# Patient Record
Sex: FEMALE | Race: WHITE | NOT HISPANIC OR LATINO | Employment: OTHER | URBAN - METROPOLITAN AREA
[De-identification: names, ages, dates, MRNs, and addresses within clinical notes are randomized per-mention and may not be internally consistent; named-entity substitution may affect disease eponyms.]

---

## 2018-08-06 ENCOUNTER — OFFICE VISIT (OUTPATIENT)
Dept: FAMILY MEDICINE | Facility: CLINIC | Age: 68
End: 2018-08-06
Payer: MEDICARE

## 2018-08-06 ENCOUNTER — HOSPITAL ENCOUNTER (OUTPATIENT)
Dept: RADIOLOGY | Facility: HOSPITAL | Age: 68
Discharge: HOME | End: 2018-08-06
Attending: NURSE PRACTITIONER
Payer: MEDICARE

## 2018-08-06 VITALS
HEIGHT: 66 IN | WEIGHT: 191 LBS | HEART RATE: 79 BPM | SYSTOLIC BLOOD PRESSURE: 138 MMHG | DIASTOLIC BLOOD PRESSURE: 83 MMHG | BODY MASS INDEX: 30.7 KG/M2 | TEMPERATURE: 98 F | OXYGEN SATURATION: 97 % | RESPIRATION RATE: 18 BRPM

## 2018-08-06 DIAGNOSIS — R05.9 COUGH: ICD-10-CM

## 2018-08-06 DIAGNOSIS — R05.9 COUGH: Primary | ICD-10-CM

## 2018-08-06 PROCEDURE — 99203 OFFICE O/P NEW LOW 30 MIN: CPT | Mod: S$GLB,,, | Performed by: NURSE PRACTITIONER

## 2018-08-06 PROCEDURE — 71046 X-RAY EXAM CHEST 2 VIEWS: CPT | Mod: TC,FY

## 2018-08-06 PROCEDURE — 71046 X-RAY EXAM CHEST 2 VIEWS: CPT | Mod: 26,,, | Performed by: RADIOLOGY

## 2018-08-06 RX ORDER — ASPIRIN 81 MG/1
81 TABLET ORAL DAILY
COMMUNITY
End: 2023-08-30

## 2018-08-06 RX ORDER — FLUTICASONE PROPIONATE 50 MCG
1 SPRAY, SUSPENSION (ML) NASAL DAILY
Qty: 1 BOTTLE | Refills: 3 | Status: SHIPPED | OUTPATIENT
Start: 2018-08-06 | End: 2018-10-09

## 2018-08-06 RX ORDER — OMEPRAZOLE 40 MG/1
40 CAPSULE, DELAYED RELEASE ORAL DAILY
COMMUNITY
End: 2019-02-04 | Stop reason: SDUPTHER

## 2018-08-06 RX ORDER — FLUOXETINE HYDROCHLORIDE 40 MG/1
40 CAPSULE ORAL DAILY
COMMUNITY
End: 2019-02-04 | Stop reason: SDUPTHER

## 2018-08-06 RX ORDER — MONTELUKAST SODIUM 10 MG/1
10 TABLET ORAL NIGHTLY
COMMUNITY
End: 2018-10-09

## 2018-08-06 RX ORDER — ATORVASTATIN CALCIUM 20 MG/1
20 TABLET, FILM COATED ORAL DAILY
COMMUNITY
End: 2019-02-04 | Stop reason: SDUPTHER

## 2018-08-06 NOTE — PROGRESS NOTES
Chief Complaint  Chief Complaint   Patient presents with    Establish Care     allergy problem, runny nose and dry cough       HPI  Zuly Person is a 67 y.o. female with medical diagnoses as listed within the medical history and problem list that presents for dry cough for approximately 2-3 months. She has been placed on Singulair with no improvement. She notes cough is dry and lingering. No fever, dyspnea or CP present..     PAST MEDICAL HISTORY:  Past Medical History:   Diagnosis Date    Anxiety     Cancer     breast cancer       PAST SURGICAL HISTORY:  Past Surgical History:   Procedure Laterality Date    BREAST SURGERY      implants    CHOLECYSTECTOMY      HYSTERECTOMY      MASTECTOMY         SOCIAL HISTORY:  Social History     Social History    Marital status:      Spouse name: N/A    Number of children: N/A    Years of education: N/A     Occupational History    Not on file.     Social History Main Topics    Smoking status: Never Smoker    Smokeless tobacco: Never Used    Alcohol use Yes    Drug use: No    Sexual activity: No     Other Topics Concern    Not on file     Social History Narrative    No narrative on file       FAMILY HISTORY:  No family history on file.    ALLERGIES AND MEDICATIONS: updated and reviewed.  Review of patient's allergies indicates:  No Known Allergies  Current Outpatient Prescriptions   Medication Sig Dispense Refill    aspirin (ECOTRIN) 81 MG EC tablet Take 81 mg by mouth once daily.      atorvastatin (LIPITOR) 20 MG tablet Take 20 mg by mouth once daily.      FLUoxetine (PROZAC) 40 MG capsule Take 40 mg by mouth once daily.      montelukast (SINGULAIR) 10 mg tablet Take 10 mg by mouth every evening.      omeprazole (PRILOSEC) 40 MG capsule Take 40 mg by mouth once daily.      fluticasone (FLONASE) 50 mcg/actuation nasal spray 1 spray (50 mcg total) by Each Nare route once daily. 1 Bottle 3     No current facility-administered medications for this visit.   "        ROS  Review of Systems   Constitutional: Negative for activity change, chills, fatigue and fever.   HENT: Positive for congestion, postnasal drip, rhinorrhea and sneezing.    Respiratory: Positive for cough. Negative for chest tightness, shortness of breath and wheezing.    Cardiovascular: Negative for chest pain, palpitations and leg swelling.   Gastrointestinal: Negative for diarrhea, nausea and vomiting.   Musculoskeletal: Negative for arthralgias, back pain, gait problem and neck pain.   Skin: Negative for color change.   Neurological: Negative for dizziness, syncope, light-headedness and headaches.   Psychiatric/Behavioral: Negative for behavioral problems.           PHYSICAL EXAM  Vitals:    08/06/18 1320   BP: 138/83   Pulse: 79   Resp: 18   Temp: 97.5 °F (36.4 °C)   TempSrc: Tympanic   SpO2: 97%   Weight: 86.6 kg (191 lb)   Height: 5' 6" (1.676 m)    Body mass index is 30.83 kg/m².  Weight: 86.6 kg (191 lb)   Height: 5' 6" (167.6 cm)       Physical Exam   Constitutional: She is oriented to person, place, and time. She appears well-developed and well-nourished.   HENT:   Head: Normocephalic and atraumatic.   Right Ear: Tympanic membrane normal.   Left Ear: Tympanic membrane normal.   Nose: Mucosal edema and rhinorrhea present.   Eyes: EOM are normal. Pupils are equal, round, and reactive to light.   Neck: Normal range of motion. Neck supple.   Cardiovascular: Normal rate, regular rhythm and normal heart sounds.    No murmur heard.  Pulmonary/Chest: Effort normal and breath sounds normal.   Abdominal: Soft. There is no tenderness.   Musculoskeletal: Normal range of motion.   Lymphadenopathy:     She has no cervical adenopathy.   Neurological: She is alert and oriented to person, place, and time.   Skin: Skin is warm.   Psychiatric: She has a normal mood and affect. Her behavior is normal. Thought content normal.         Health Maintenance    Patient has no pending health maintenance at this time   "            Assessment & Plan    Zuly was seen today for establish care.    Diagnoses and all orders for this visit:    Cough  -     X-Ray Chest PA And Lateral; Future  -     Mycoplasma Rapid Test; Future  -     CBC auto differential; Future  -     Comprehensive metabolic panel; Future  -     fluticasone (FLONASE) 50 mcg/actuation nasal spray; 1 spray (50 mcg total) by Each Nare route once daily.    Discussed differentials with patient. Will proceed with imaging and labs. Will add Flonase for now and follow up to discuss results in one week.    Follow-up: Follow-up in about 1 week (around 8/13/2018).

## 2018-08-07 DIAGNOSIS — R05.9 COUGH: Primary | ICD-10-CM

## 2018-08-07 RX ORDER — AZITHROMYCIN 250 MG/1
250 TABLET, FILM COATED ORAL DAILY
Qty: 6 TABLET | Refills: 0 | Status: SHIPPED | OUTPATIENT
Start: 2018-08-07 | End: 2018-12-19 | Stop reason: SDUPTHER

## 2018-08-07 RX ORDER — ALBUTEROL SULFATE 90 UG/1
2 AEROSOL, METERED RESPIRATORY (INHALATION) EVERY 6 HOURS PRN
Qty: 1 INHALER | Refills: 0 | Status: SHIPPED | OUTPATIENT
Start: 2018-08-07 | End: 2018-12-11 | Stop reason: SDUPTHER

## 2018-08-10 ENCOUNTER — TELEPHONE (OUTPATIENT)
Dept: FAMILY MEDICINE | Facility: CLINIC | Age: 68
End: 2018-08-10

## 2018-08-10 NOTE — TELEPHONE ENCOUNTER
----- Message from Devan Perez sent at 8/10/2018  9:12 AM CDT -----  Contact: self   Patient need to speak with a nurse regarding her inhaler, please call back at [phone].

## 2018-08-10 NOTE — TELEPHONE ENCOUNTER
"Spoke to patient about her inhaler. She was concerned that she may not need the inhaler every day. I explained to her about the inhaler and the "as needed" use. We talked about COPD and how it effects the body. Educated on s/s to use and when to seek medical attention. She verbalized understanding and has no other questions or concerns.   "

## 2018-08-10 NOTE — TELEPHONE ENCOUNTER
----- Message from Marcela Austin sent at 8/10/2018  1:17 PM CDT -----  Type: Needs Medical Advice    Who Called:  Patient  Symptoms (please be specific):  Nicolasa  How long has patient had these symptoms:  Nicolasa  Pharmacy name and phone #:  Nicolasa  Best Call Back Number: [phone] (home)     Additional Information: Wanted to know if a particular test could be completed while at her visit on Tuesday

## 2018-08-10 NOTE — TELEPHONE ENCOUNTER
Patient is very worried about a new diagnosis of COPD. She is asking is a spirometry can be done to verify the x-ray results before her appointment on Tuesday?

## 2018-08-13 NOTE — TELEPHONE ENCOUNTER
Yes it can. But it is likely her pulmonologist will order it. I can order it but likely won't be done by tomorrow. Please let me know.

## 2018-08-14 ENCOUNTER — OFFICE VISIT (OUTPATIENT)
Dept: FAMILY MEDICINE | Facility: CLINIC | Age: 68
End: 2018-08-14
Payer: MEDICARE

## 2018-08-14 VITALS
BODY MASS INDEX: 30.7 KG/M2 | SYSTOLIC BLOOD PRESSURE: 140 MMHG | DIASTOLIC BLOOD PRESSURE: 80 MMHG | HEIGHT: 66 IN | HEART RATE: 88 BPM | OXYGEN SATURATION: 99 % | TEMPERATURE: 97 F | WEIGHT: 191 LBS

## 2018-08-14 DIAGNOSIS — R05.9 COUGH: Primary | ICD-10-CM

## 2018-08-14 DIAGNOSIS — J44.9 CHRONIC OBSTRUCTIVE PULMONARY DISEASE, UNSPECIFIED COPD TYPE: ICD-10-CM

## 2018-08-14 PROCEDURE — 99213 OFFICE O/P EST LOW 20 MIN: CPT | Mod: S$GLB,,, | Performed by: NURSE PRACTITIONER

## 2018-08-15 PROBLEM — J44.9 CHRONIC OBSTRUCTIVE PULMONARY DISEASE: Status: ACTIVE | Noted: 2018-08-15

## 2018-08-15 NOTE — PROGRESS NOTES
Chief Complaint  Chief Complaint   Patient presents with    Follow-up     lab review        HPI  Zuly Person is a 67 y.o. female with medical diagnoses as listed within the medical history and problem list that presents for dry cough for approximately 2-3 months. She has been placed on Singulair with no improvement. She notes cough is dry and lingering. No fever, dyspnea or CP present..     PAST MEDICAL HISTORY:  Past Medical History:   Diagnosis Date    Anxiety     Cancer     breast cancer       PAST SURGICAL HISTORY:  Past Surgical History:   Procedure Laterality Date    BREAST SURGERY      implants    CHOLECYSTECTOMY      HYSTERECTOMY      MASTECTOMY         SOCIAL HISTORY:  Social History     Socioeconomic History    Marital status:      Spouse name: Not on file    Number of children: Not on file    Years of education: Not on file    Highest education level: Not on file   Social Needs    Financial resource strain: Not on file    Food insecurity - worry: Not on file    Food insecurity - inability: Not on file    Transportation needs - medical: Not on file    Transportation needs - non-medical: Not on file   Occupational History    Not on file   Tobacco Use    Smoking status: Never Smoker    Smokeless tobacco: Never Used   Substance and Sexual Activity    Alcohol use: Yes    Drug use: No    Sexual activity: No   Other Topics Concern    Not on file   Social History Narrative    Not on file       FAMILY HISTORY:  History reviewed. No pertinent family history.    ALLERGIES AND MEDICATIONS: updated and reviewed.  Review of patient's allergies indicates:  No Known Allergies  Current Outpatient Medications   Medication Sig Dispense Refill    albuterol 90 mcg/actuation inhaler Inhale 2 puffs into the lungs every 6 (six) hours as needed for Wheezing. Rescue 1 Inhaler 0    aspirin (ECOTRIN) 81 MG EC tablet Take 81 mg by mouth once daily.      atorvastatin (LIPITOR) 20 MG tablet Take 20 mg  "by mouth once daily.      azithromycin (Z-PENG) 250 MG tablet Take 1 tablet (250 mg total) by mouth once daily. Take 2 tabs PO day 1, then take 1 tab PO once daily for days 2-5. 6 tablet 0    FLUoxetine (PROZAC) 40 MG capsule Take 40 mg by mouth once daily.      fluticasone (FLONASE) 50 mcg/actuation nasal spray 1 spray (50 mcg total) by Each Nare route once daily. 1 Bottle 3    montelukast (SINGULAIR) 10 mg tablet Take 10 mg by mouth every evening.      omeprazole (PRILOSEC) 40 MG capsule Take 40 mg by mouth once daily.       No current facility-administered medications for this visit.          ROS  Review of Systems   Constitutional: Negative for activity change, chills, fatigue and fever.   HENT: Negative for congestion, postnasal drip, rhinorrhea and sneezing.    Respiratory: Positive for cough (improved but still present, not as severe). Negative for chest tightness, shortness of breath and wheezing.    Cardiovascular: Negative for chest pain, palpitations and leg swelling.   Gastrointestinal: Negative for diarrhea, nausea and vomiting.   Musculoskeletal: Negative for arthralgias, back pain, gait problem and neck pain.   Skin: Negative for color change.   Neurological: Negative for dizziness, syncope, light-headedness and headaches.   Psychiatric/Behavioral: Negative for behavioral problems.           PHYSICAL EXAM  Vitals:    08/14/18 1415 08/14/18 1458   BP: 134/88 (!) 140/80   BP Location: Left arm    Patient Position: Sitting    BP Method: Large (Automatic)    Pulse: 88    Temp: 97.3 °F (36.3 °C)    TempSrc: Tympanic    SpO2: 99%    Weight: 86.6 kg (191 lb)    Height: 5' 6" (1.676 m)     Body mass index is 30.83 kg/m².  Weight: 86.6 kg (191 lb)   Height: 5' 6" (167.6 cm)       Physical Exam   Constitutional: She is oriented to person, place, and time. She appears well-developed and well-nourished.   HENT:   Head: Normocephalic and atraumatic.   Right Ear: Tympanic membrane normal.   Left Ear: Tympanic " membrane normal.   Nose: Mucosal edema and rhinorrhea present.   Eyes: EOM are normal. Pupils are equal, round, and reactive to light.   Neck: Normal range of motion. Neck supple.   Cardiovascular: Normal rate, regular rhythm and normal heart sounds.   No murmur heard.  Pulmonary/Chest: Effort normal and breath sounds normal.   Abdominal: Soft. There is no tenderness.   Musculoskeletal: Normal range of motion.   Lymphadenopathy:     She has no cervical adenopathy.   Neurological: She is alert and oriented to person, place, and time.   Skin: Skin is warm.   Psychiatric: She has a normal mood and affect. Her behavior is normal. Thought content normal.         Health Maintenance    Patient has no pending health maintenance at this time              Assessment & Plan    Zuly was seen today for follow-up.    Diagnoses and all orders for this visit:    Cough  -     Ambulatory referral to Pulmonology    Chronic obstructive pulmonary disease, unspecified COPD type  -     Ambulatory referral to Pulmonology    Discussed differentials with patient. She will continue Singulair, Flonase, Prilosec and Albuterol inhaler if needed. I discussed pulmonology evaluation due to ongoing symptoms and new findings of interstitial changes. She is amicable with plan and referral sent.     Follow-up: Follow-up if symptoms worsen or fail to improve.

## 2018-10-09 ENCOUNTER — OFFICE VISIT (OUTPATIENT)
Dept: PULMONOLOGY | Facility: CLINIC | Age: 68
End: 2018-10-09
Payer: MEDICARE

## 2018-10-09 VITALS
SYSTOLIC BLOOD PRESSURE: 173 MMHG | DIASTOLIC BLOOD PRESSURE: 81 MMHG | WEIGHT: 196.19 LBS | HEART RATE: 70 BPM | OXYGEN SATURATION: 99 % | HEIGHT: 66 IN | BODY MASS INDEX: 31.53 KG/M2

## 2018-10-09 DIAGNOSIS — J45.20 MILD INTERMITTENT ASTHMA WITHOUT COMPLICATION: ICD-10-CM

## 2018-10-09 DIAGNOSIS — R05.3 CHRONIC COUGH: ICD-10-CM

## 2018-10-09 DIAGNOSIS — R09.89 CHRONIC SINUS COMPLAINTS: ICD-10-CM

## 2018-10-09 DIAGNOSIS — K21.9 GASTROESOPHAGEAL REFLUX DISEASE, ESOPHAGITIS PRESENCE NOT SPECIFIED: Primary | ICD-10-CM

## 2018-10-09 PROBLEM — J44.9 CHRONIC OBSTRUCTIVE PULMONARY DISEASE: Status: RESOLVED | Noted: 2018-08-15 | Resolved: 2018-10-09

## 2018-10-09 PROCEDURE — 99999 PR PBB SHADOW E&M-EST. PATIENT-LVL IV: CPT | Mod: PBBFAC,,, | Performed by: INTERNAL MEDICINE

## 2018-10-09 PROCEDURE — 99214 OFFICE O/P EST MOD 30 MIN: CPT | Mod: PBBFAC,PO | Performed by: INTERNAL MEDICINE

## 2018-10-09 PROCEDURE — 99205 OFFICE O/P NEW HI 60 MIN: CPT | Mod: S$PBB,,, | Performed by: INTERNAL MEDICINE

## 2018-10-09 RX ORDER — BUDESONIDE AND FORMOTEROL FUMARATE DIHYDRATE 160; 4.5 UG/1; UG/1
2 AEROSOL RESPIRATORY (INHALATION) EVERY 12 HOURS
Qty: 1 INHALER | Refills: 11 | Status: SHIPPED | OUTPATIENT
Start: 2018-10-09 | End: 2019-02-04 | Stop reason: SDUPTHER

## 2018-10-09 RX ORDER — MONTELUKAST SODIUM 10 MG/1
10 TABLET ORAL NIGHTLY
Qty: 30 TABLET | Refills: 11 | Status: SHIPPED | OUTPATIENT
Start: 2018-10-09 | End: 2019-02-04 | Stop reason: SDUPTHER

## 2018-10-09 RX ORDER — PREDNISONE 20 MG/1
TABLET ORAL
Qty: 12 TABLET | Refills: 0 | Status: SHIPPED | OUTPATIENT
Start: 2018-10-09 | End: 2018-12-19 | Stop reason: SDUPTHER

## 2018-10-09 RX ORDER — FLUTICASONE PROPIONATE 50 MCG
1 SPRAY, SUSPENSION (ML) NASAL DAILY
Qty: 1 BOTTLE | Refills: 11 | Status: SHIPPED | OUTPATIENT
Start: 2018-10-09 | End: 2019-02-04 | Stop reason: SDUPTHER

## 2018-10-09 NOTE — PATIENT INSTRUCTIONS
You have mild intermittent asthma.    Impairment is measured by lung capacity- over 50% is not bad- below 80% would suggest component of copd.    Would recommend trial symbicort 2 twice daily (bedtime only ok)- use mainly when cough active or if helps.    Use albutrerol as needed - 2-3 puffs every 4 hrs as needed for cough.    If cough bad- may use prednisone as would clear asthma dramatically.    You have component of upper airway syndrome contributing to cough- flonase 1-2 each side, or singulair should help.    cxr no report of concern for cancer.    Follow up if needed/etc....

## 2018-10-09 NOTE — PROGRESS NOTES
"10/9/2018    Zulynorth Person  New Patient Consult    Chief Complaint   Patient presents with    Cough    COPD       HPI: pt stopped smoking 20 yrs ago with birth grandson.  Started coughing 18 yrs ago with resolution with gerd rx.    Pt no limits with chores- no exercise.   good, lives [facility].      Sinus ok.  No asthma.     Had chronic cough - nocturnal worsening. Ongoing for yrs- seasonally worse in spring/summer- better last wks.     Used albuterol once with no benefit.  No wheezes.     The chief compliant  problem is new to me",   PFSH:  Past Medical History:   Diagnosis Date    Anxiety     Cancer     breast cancer         Past Surgical History:   Procedure Laterality Date    BREAST SURGERY      implants    CHOLECYSTECTOMY      HYSTERECTOMY      MASTECTOMY       Social History     Tobacco Use    Smoking status: Former Smoker     Packs/day: 1.00     Years: 15.00     Pack years: 15.00     Types: Cigarettes     Last attempt to quit:      Years since quittin.7    Smokeless tobacco: Never Used   Substance Use Topics    Alcohol use: Yes     Frequency: 4 or more times a week     Drinks per session: 1 or 2     Binge frequency: Never    Drug use: No     History reviewed. No pertinent family history.  Review of patient's allergies indicates:  No Known Allergies    Performance Status:The patient's activity level is no limits with regular activity.      Review of Systems:  a review of eleven systems covering constitutional, Eye, HEENT, Psych, Respiratory, Cardiac, GI, , Musculoskeletal, Endocrine, Dermatologic was negative except for pertinent findings as listed ABOVE and below:  pertinent positive as above, rest is good       Exam:Comprehensive exam done. BP (!) 173/81 (BP Location: Left arm, Patient Position: Sitting)   Pulse 70   Ht 5' 6" (1.676 m)   Wt 89 kg (196 lb 3.4 oz)   SpO2 99% Comment: on room air  BMI 31.67 kg/m²   Exam included Vitals as listed, and patient's appearance and " affect and alertness and mood, oral exam for yeast and hygiene and pharynx lesions and Mallapatti (M) score, neck with inspection for jvd and masses and thyroid abnormalities and lymph nodes (supraclavicular and infraclavicular nodes and axillary also examined and noted if abn), chest exam included symmetry and effort and fremitus and percussion and auscultation, cardiac exam included rhythm and gallops and murmur and rubs and jvd and edema, abdominal exam for mass and hepatosplenomegaly and tenderness and hernias and bowel sounds, Musculoskeletal exam with muscle tone and posture and mobility/gait and  strength, and skin for rashes and cyanosis and pallor and turgor, extremity for clubbing.  Findings were normal except for pertinent findings listed below:  M2, chest is symmetric, no distress, normal percussion, normal fremitus and good normal breath sounds      Radiographs (ct chest and cxr) reviewed: results reviewed  Copd and ild changes?    Labs none available        PFT will be done and results to be reviewed       Plan:  Clinical impression is apparently straight forward and impression with management as below.      Zuly was seen today for cough and copd.    Diagnoses and all orders for this visit:    Gastroesophageal reflux disease, esophagitis presence not specified    Mild intermittent asthma without complication  -     Complete PFT with bronchodilator; Future  -     budesonide-formoterol 160-4.5 mcg (SYMBICORT) 160-4.5 mcg/actuation HFAA; Inhale 2 puffs into the lungs every 12 (twelve) hours. Controller  -     predniSONE (DELTASONE) 20 MG tablet; One daily for 3 days and repeat for flare of lung symptoms as intructed  -     montelukast (SINGULAIR) 10 mg tablet; Take 1 tablet (10 mg total) by mouth every evening.    Chronic cough  -     Complete PFT with bronchodilator; Future  -     budesonide-formoterol 160-4.5 mcg (SYMBICORT) 160-4.5 mcg/actuation HFAA; Inhale 2 puffs into the lungs every 12 (twelve)  hours. Controller  -     predniSONE (DELTASONE) 20 MG tablet; One daily for 3 days and repeat for flare of lung symptoms as intructed  -     montelukast (SINGULAIR) 10 mg tablet; Take 1 tablet (10 mg total) by mouth every evening.    Chronic sinus complaints  -     montelukast (SINGULAIR) 10 mg tablet; Take 1 tablet (10 mg total) by mouth every evening.  -     fluticasone (FLONASE) 50 mcg/actuation nasal spray; 1 spray (50 mcg total) by Each Nare route once daily.        Follow-up if symptoms worsen or fail to improve.    Discussed with patient above for education the following:      Patient Instructions   You have mild intermittent asthma.    Impairment is measured by lung capacity- over 50% is not bad- below 80% would suggest component of copd.    Would recommend trial symbicort 2 twice daily (bedtime only ok)- use mainly when cough active or if helps.    Use albutrerol as needed - 2-3 puffs every 4 hrs as needed for cough.    If cough bad- may use prednisone as would clear asthma dramatically.    You have component of upper airway syndrome contributing to cough- flonase 1-2 each side, or singulair should help.    cxr no report of concern for cancer.    Follow up if needed/etc....

## 2018-10-09 NOTE — LETTER
October 9, 2018      Sakshi Mckeon, FNP  [address] [place] [address]           [place] [facility]  [address]  [place] [address]  Phone: [phone]  Fax: [phone]          Patient: Zuly Person   MR Number: [id number]   YOB: 1950   Date of Visit: 10/9/2018       Dear Sakshi Mckeon:    Thank you for referring Zuly Person to me for evaluation. Attached you will find relevant portions of my assessment and plan of care.    If you have questions, please do not hesitate to call me. I look forward to following Zuly Person along with you.    Sincerely,    Josh Herndon MD    Enclosure  CC:  No Recipients    If you would like to receive this communication electronically, please contact [email or web address] or [phone] to request more information on [employer] Link access.    For providers and/or their staff who would like to refer a patient to [facility], please contact us through our one-stop-shop provider referral line, [facility], at [phone].    If you feel you have received this communication in error or would no longer like to receive these types of communications, please e-mail [email or web address]

## 2018-10-17 ENCOUNTER — TELEPHONE (OUTPATIENT)
Dept: SURGERY | Facility: CLINIC | Age: 68
End: 2018-10-17

## 2018-10-17 NOTE — TELEPHONE ENCOUNTER
LVM for pt to return call concerning message. Pt needs a referral before consult can be scheduled.

## 2018-10-17 NOTE — TELEPHONE ENCOUNTER
----- Message from Ingris Hernandez sent at 10/17/2018 11:00 AM CDT -----  Type: Needs Medical Advice    Who Called:  Patient   Symptoms (please be specific):  n/a  How long has patient had these symptoms:  n/a  Pharmacy name and phone #:  n/a  Best Call Back Number: [phone]  Additional Information: patient is requesting to schedule a screening colonoscopy contact to advise    Thank you

## 2018-12-11 DIAGNOSIS — R05.9 COUGH: ICD-10-CM

## 2018-12-11 RX ORDER — ALBUTEROL SULFATE 90 UG/1
2 AEROSOL, METERED RESPIRATORY (INHALATION) EVERY 6 HOURS PRN
Qty: 1 INHALER | Refills: 0 | Status: SHIPPED | OUTPATIENT
Start: 2018-12-11 | End: 2018-12-19 | Stop reason: SDUPTHER

## 2018-12-11 NOTE — TELEPHONE ENCOUNTER
Sent rx refill request from pt to Md for approval.  ----- Message from Ramez Lopez sent at 12/11/2018  8:34 AM CST -----  Type:  RX Refill Request    Who Called: pt  Refill or New Rx: refill  RX Name and Strength:  albuterol 90 mcg/actuation inhaler   Preferred Pharmacy with phone number:    [facility] [address] [place] [address] AT [facility] [address]  [address] [address]  Phone: [phone] Fax: [phone]  Local or Mail Order:  local  Ordering Provider:  same  Best Call Back Number:  [phone]  Additional Information:  Pt needs a refill. Please call pt when completed to advise.

## 2018-12-19 ENCOUNTER — TELEPHONE (OUTPATIENT)
Dept: PULMONOLOGY | Facility: CLINIC | Age: 68
End: 2018-12-19

## 2018-12-19 ENCOUNTER — OFFICE VISIT (OUTPATIENT)
Dept: PULMONOLOGY | Facility: CLINIC | Age: 68
End: 2018-12-19
Payer: MEDICARE

## 2018-12-19 VITALS
SYSTOLIC BLOOD PRESSURE: 134 MMHG | HEART RATE: 73 BPM | OXYGEN SATURATION: 94 % | WEIGHT: 195.13 LBS | HEIGHT: 66 IN | BODY MASS INDEX: 31.36 KG/M2 | DIASTOLIC BLOOD PRESSURE: 76 MMHG

## 2018-12-19 DIAGNOSIS — R05.9 COUGH: ICD-10-CM

## 2018-12-19 DIAGNOSIS — J45.20 MILD INTERMITTENT ASTHMA WITHOUT COMPLICATION: ICD-10-CM

## 2018-12-19 DIAGNOSIS — R05.3 CHRONIC COUGH: ICD-10-CM

## 2018-12-19 PROCEDURE — 99213 OFFICE O/P EST LOW 20 MIN: CPT | Mod: PBBFAC,PO | Performed by: NURSE PRACTITIONER

## 2018-12-19 PROCEDURE — 99999 PR PBB SHADOW E&M-EST. PATIENT-LVL III: CPT | Mod: PBBFAC,,, | Performed by: NURSE PRACTITIONER

## 2018-12-19 PROCEDURE — 99214 OFFICE O/P EST MOD 30 MIN: CPT | Mod: S$PBB,,, | Performed by: NURSE PRACTITIONER

## 2018-12-19 RX ORDER — PREDNISONE 20 MG/1
TABLET ORAL
Qty: 12 TABLET | Refills: 0 | Status: SHIPPED | OUTPATIENT
Start: 2018-12-19 | End: 2019-02-18

## 2018-12-19 RX ORDER — AZITHROMYCIN 250 MG/1
250 TABLET, FILM COATED ORAL DAILY
Qty: 6 TABLET | Refills: 2 | Status: SHIPPED | OUTPATIENT
Start: 2018-12-19 | End: 2019-02-18

## 2018-12-19 RX ORDER — ALBUTEROL SULFATE 90 UG/1
2 AEROSOL, METERED RESPIRATORY (INHALATION) EVERY 6 HOURS PRN
Qty: 1 INHALER | Refills: 11 | Status: SHIPPED | OUTPATIENT
Start: 2018-12-19 | End: 2019-02-04 | Stop reason: SDUPTHER

## 2018-12-19 NOTE — TELEPHONE ENCOUNTER
Pharmacy called saying they do not carry codeine 5-guafensin 200mg had to be changed to codeine5 guafensin  100mg. Discussed with NP. NP stated the changed was fine. Pharmacist changed order under MD name. Verbal readback was done. No further action needed.

## 2018-12-19 NOTE — PROGRESS NOTES
"2018    Zulynorth Person  New Patient Consult    Chief Complaint   Patient presents with    Cough     has returned and is more persistent, finished prednisone with little relief       HPI:     2018-  Had return cough, albuterol may have worsened and then improved.  Relapse  and started symbicort regular, took prednisone for 3 days last wk with good response then relapse.  Had tickle in throat. Pt has wheeze intermittently.      10/9/2018pt stopped smoking 20 yrs ago with birth grandson.  Started coughing 18 yrs ago with resolution with gerd rx.  Pt no limits with chores- no exercise.   good, lives [facility].    Sinus ok.  No asthma.   Had chronic cough - nocturnal worsening. Ongoing for yrs- seasonally worse in spring/summer- better last wks.   Used albuterol once with no benefit.  No wheezes.   Patient Instructions   You have mild intermittent asthma.   Impairment is measured by lung capacity- over 50% is not bad- below 80% would suggest component of copd.   Would recommend trial symbicort 2 twice daily (bedtime only ok)- use mainly when cough active or if helps.   Use albutrerol as needed - 2-3 puffs every 4 hrs as needed for cough.   If cough bad- may use prednisone as would clear asthma dramatically.   You have component of upper airway syndrome contributing to cough- flonase 1-2 each side, or singulair should help.   cxr no report of concern for cancer.   Follow up if needed/etc....         The chief compliant  problem is new to me",   PFSH:  Past Medical History:   Diagnosis Date    Anxiety     Cancer     breast cancer         Past Surgical History:   Procedure Laterality Date    BREAST SURGERY      implants    CHOLECYSTECTOMY      HYSTERECTOMY      MASTECTOMY       Social History     Tobacco Use    Smoking status: Former Smoker     Packs/day: 1.00     Years: 15.00     Pack years: 15.00     Types: Cigarettes     Last attempt to quit:      Years since quittin.9    " "Smokeless tobacco: Never Used   Substance Use Topics    Alcohol use: Yes     Frequency: 4 or more times a week     Drinks per session: 1 or 2     Binge frequency: Never    Drug use: No     History reviewed. No pertinent family history.  Review of patient's allergies indicates:  No Known Allergies    Performance Status:The patient's activity level is no limits with regular activity.      Review of Systems:  a review of eleven systems covering constitutional, Eye, HEENT, Psych, Respiratory, Cardiac, GI, , Musculoskeletal, Endocrine, Dermatologic was negative except for pertinent findings as listed ABOVE and below:  pertinent positive as above, rest is good       Exam:Comprehensive exam done. /76 (BP Location: Left arm, Patient Position: Sitting)   Pulse 73   Ht 5' 6" (1.676 m)   Wt 88.5 kg (195 lb 1.7 oz)   SpO2 (!) 94% Comment: on room air  BMI 31.49 kg/m²   Exam included Vitals as listed, and patient's appearance and affect and alertness and mood, oral exam for yeast and hygiene and pharynx lesions and Mallapatti (M) score, neck with inspection for jvd and masses and thyroid abnormalities and lymph nodes (supraclavicular and infraclavicular nodes and axillary also examined and noted if abn), chest exam included symmetry and effort and fremitus and percussion and auscultation, cardiac exam included rhythm and gallops and murmur and rubs and jvd and edema, abdominal exam for mass and hepatosplenomegaly and tenderness and hernias and bowel sounds, Musculoskeletal exam with muscle tone and posture and mobility/gait and  strength, and skin for rashes and cyanosis and pallor and turgor, extremity for clubbing.  Findings were normal except for pertinent findings listed below:  M2, chest is symmetric, no distress, normal percussion, normal fremitus and good normal breath sounds      Radiographs (ct chest and cxr) reviewed: results reviewed  Copd and ild changes?    Labs none available        PFT will be " done and results to be reviewed       Plan:  Clinical impression is apparently straight forward and impression with management as below.      Zuly was seen today for cough.    Diagnoses and all orders for this visit:    Mild intermittent asthma without complication  -     predniSONE (DELTASONE) 20 MG tablet; One daily for 3 days and repeat for flare of lung symptoms as intructed  -     azithromycin (Z-PENG) 250 MG tablet; Take 1 tablet (250 mg total) by mouth once daily. Take 2 tabs PO day 1, then take 1 tab PO once daily for days 2-5.  -     albuterol (PROVENTIL/VENTOLIN HFA) 90 mcg/actuation inhaler; Inhale 2 puffs into the lungs every 6 (six) hours as needed for Wheezing. Rescue  -     Complete PFT without bronchodilator; Future  -     codeine-guaifenesin  mg/5 mL Liqd; Take 10 mLs by mouth 3 (three) times daily as needed.    Chronic cough  -     predniSONE (DELTASONE) 20 MG tablet; One daily for 3 days and repeat for flare of lung symptoms as intructed  -     codeine-guaifenesin  mg/5 mL Liqd; Take 10 mLs by mouth 3 (three) times daily as needed.    Cough  -     azithromycin (Z-PENG) 250 MG tablet; Take 1 tablet (250 mg total) by mouth once daily. Take 2 tabs PO day 1, then take 1 tab PO once daily for days 2-5.  -     albuterol (PROVENTIL/VENTOLIN HFA) 90 mcg/actuation inhaler; Inhale 2 puffs into the lungs every 6 (six) hours as needed for Wheezing. Rescue        Follow-up in about 1 year (around 12/19/2019), or if symptoms worsen or fail to improve.    Discussed with patient above for education the following:      Patient Instructions   Asthma by history, breathing test needed to determine if breathing impaired.   specail asthma test useful if no response.       Asthma prevention- symbicort 2 twice daily, decrease frequency and severity of asthma    Rescue - albuterol 2-3 with spacer as needed, call in if wish nebulizer machine?  May trigger cough less over 15 minutes needed to use    Action plan -  will put asthma into remission if high enough dose long enough given.  symbicort alone may work if very mild    May skip flonase /singular if doing well    Azithromycin if cough yellow mucous.    Codeine suppresses cough- will not treat asthma.

## 2018-12-19 NOTE — PATIENT INSTRUCTIONS
Asthma by history, breathing test needed to determine if breathing impaired.   specail asthma test useful if no response.       Asthma prevention- symbicort 2 twice daily, decrease frequency and severity of asthma    Rescue - albuterol 2-3 with spacer as needed, call in if wish nebulizer machine?  May trigger cough less over 15 minutes needed to use    Action plan - will put asthma into remission if high enough dose long enough given.  symbicort alone may work if very mild    May skip flonase /singular if doing well    Azithromycin if cough yellow mucous.    Codeine suppresses cough- will not treat asthma.

## 2019-01-28 ENCOUNTER — HOSPITAL ENCOUNTER (OUTPATIENT)
Dept: RADIOLOGY | Facility: HOSPITAL | Age: 69
Discharge: HOME | End: 2019-01-28
Attending: PODIATRIST
Payer: MEDICARE

## 2019-01-28 ENCOUNTER — OFFICE VISIT (OUTPATIENT)
Dept: PODIATRY | Facility: CLINIC | Age: 69
End: 2019-01-28
Payer: MEDICARE

## 2019-01-28 VITALS
WEIGHT: 191 LBS | DIASTOLIC BLOOD PRESSURE: 87 MMHG | HEART RATE: 77 BPM | HEIGHT: 66 IN | BODY MASS INDEX: 30.7 KG/M2 | SYSTOLIC BLOOD PRESSURE: 144 MMHG

## 2019-01-28 DIAGNOSIS — Q66.70 PES CAVUS, CONGENITAL: ICD-10-CM

## 2019-01-28 DIAGNOSIS — D36.10 NEUROMA: ICD-10-CM

## 2019-01-28 DIAGNOSIS — D36.10 NEUROMA: Primary | ICD-10-CM

## 2019-01-28 PROCEDURE — 99203 PR OFFICE/OUTPT VISIT, NEW, LEVL III, 30-44 MIN: ICD-10-PCS | Mod: S$PBB,,, | Performed by: PODIATRIST

## 2019-01-28 PROCEDURE — 99203 OFFICE O/P NEW LOW 30 MIN: CPT | Mod: S$PBB,,, | Performed by: PODIATRIST

## 2019-01-28 PROCEDURE — 99213 OFFICE O/P EST LOW 20 MIN: CPT | Mod: PBBFAC,25 | Performed by: PODIATRIST

## 2019-01-28 PROCEDURE — 99999 PR PBB SHADOW E&M-EST. PATIENT-LVL III: CPT | Mod: PBBFAC,,, | Performed by: PODIATRIST

## 2019-01-28 PROCEDURE — 73630 XR FOOT COMPLETE 3 VIEW BILATERAL: ICD-10-PCS | Mod: 26,50,, | Performed by: RADIOLOGY

## 2019-01-28 PROCEDURE — 73630 X-RAY EXAM OF FOOT: CPT | Mod: 26,50,, | Performed by: RADIOLOGY

## 2019-01-28 PROCEDURE — 99999 PR PBB SHADOW E&M-EST. PATIENT-LVL III: ICD-10-PCS | Mod: PBBFAC,,, | Performed by: PODIATRIST

## 2019-01-28 PROCEDURE — 73630 X-RAY EXAM OF FOOT: CPT | Mod: 50,TC,FY

## 2019-01-28 RX ORDER — TIZANIDINE 4 MG/1
TABLET ORAL
Refills: 0 | COMMUNITY
Start: 2018-12-19 | End: 2022-04-12

## 2019-01-29 ENCOUNTER — TELEPHONE (OUTPATIENT)
Dept: PODIATRY | Facility: CLINIC | Age: 69
End: 2019-01-29

## 2019-01-29 RX ORDER — MELOXICAM 15 MG/1
15 TABLET ORAL DAILY
Qty: 30 TABLET | Refills: 1 | Status: SHIPPED | OUTPATIENT
Start: 2019-01-29 | End: 2019-02-22 | Stop reason: ALTCHOICE

## 2019-01-29 NOTE — TELEPHONE ENCOUNTER
----- Message from Marcy Claudio sent at 1/29/2019  3:53 PM CST -----  Contact: Patient  Type: Needs Medical Advice    Who Called:  Patient   Best Call Back Number:   Additional Information: Calling to speak with the Nurse. She was in the office yesterday and an anti inflammatory was supposed to be called in. She checked yesterday and today. Please advise. Call to pod. No answer.

## 2019-02-02 NOTE — PROGRESS NOTES
Subjective:       Patient ID: Zuly Person is a 68 y.o. female.    Chief Complaint: Foot Problem and Foot Pain    HPI patient presents with a complaint of pain in the balls of her feet the left is slightly worse than the right this has been going on for 6-7 months and has gotten progressively worse patient relates a burning type pain.  Patient states when she does aerobics she really does not have any pain however when she is at work she experiences the pain more often she states that she was wearing a lot of flip-flops at the time that this started she does not go barefoot at all.  Review of Systems   Musculoskeletal: Positive for arthralgias and joint swelling.   All other systems reviewed and are negative.      Objective:      Physical Exam   Constitutional: She appears well-developed and well-nourished.   Cardiovascular:   Pulses:       Dorsalis pedis pulses are 2+ on the right side, and 2+ on the left side.        Posterior tibial pulses are 1+ on the right side, and 1+ on the left side.   Pulmonary/Chest: Effort normal.   Musculoskeletal: Normal range of motion. She exhibits deformity.        Right foot: There is deformity.        Left foot: There is deformity.   Feet:   Right Foot:   Protective Sensation: 4 sites tested. 4 sites sensed.   Skin Integrity: Positive for warmth and callus.   Left Foot:   Protective Sensation: 4 sites tested. 4 sites sensed.   Skin Integrity: Positive for warmth and callus.   Neurological: She is alert.   Skin: Skin is warm. Capillary refill takes 2 to 3 seconds.   Psychiatric: She has a normal mood and affect. Her behavior is normal. Judgment and thought content normal.   Nursing note and vitals reviewed.      Assessment:       1. Neuroma    2. Pes cavus, congenital        Plan:       Patient advised that he/she has findings consistent with neuroma. Patient is to discontinue all barefoot walking I advised her it is important that he/she wears appropriate support and have  recommended power step arch supports for her to begin wearing at all times. patient advised that flat shoes they're not giving her the appropriate support putting excessive pressure in the region of the forefoot which is contributing to it causing the patient's pain and discomfort at this time. Patient will begin with power steps it is recommended its possible that he/she may need a metatarsal pad or additional arch support placed in the power steps and also the patient as needed for followup she should expect to experience relief and decreased pain over the next several weeks.  Patient is to make sure she does not go barefoot I did take x-rays of both the patient's feet patient has significant narrowing between the 2nd and 3rd metatarsal heads consistent with neuroma there is also a positive Ni's click noted coming from this area this is noted more so on the left in comparison to the right.  I did discuss with the patient types of shoe she needs to wear to avoid barefoot I have recommended power step arch supports to the patient advising her we can add additional support possibly even a metatarsal pad as needed patient does have significantly elevated arches bilateral.  Patient was started on Mobic 15 mg a day plan follow-up will be 3 weeks I have advised the patient she can continue her activities as long as she is tolerating them but must wear the arch supports at all times.

## 2019-02-04 ENCOUNTER — OFFICE VISIT (OUTPATIENT)
Dept: FAMILY MEDICINE | Facility: CLINIC | Age: 69
End: 2019-02-04
Payer: MEDICARE

## 2019-02-04 VITALS
BODY MASS INDEX: 30.99 KG/M2 | HEART RATE: 74 BPM | SYSTOLIC BLOOD PRESSURE: 134 MMHG | WEIGHT: 192.81 LBS | RESPIRATION RATE: 19 BRPM | DIASTOLIC BLOOD PRESSURE: 80 MMHG | OXYGEN SATURATION: 95 % | HEIGHT: 66 IN

## 2019-02-04 DIAGNOSIS — R05.3 CHRONIC COUGH: ICD-10-CM

## 2019-02-04 DIAGNOSIS — R09.89 CHRONIC SINUS COMPLAINTS: ICD-10-CM

## 2019-02-04 DIAGNOSIS — Z12.11 SCREENING FOR MALIGNANT NEOPLASM OF COLON: ICD-10-CM

## 2019-02-04 DIAGNOSIS — K21.9 GASTROESOPHAGEAL REFLUX DISEASE, ESOPHAGITIS PRESENCE NOT SPECIFIED: Primary | ICD-10-CM

## 2019-02-04 DIAGNOSIS — G89.29 CHRONIC RIGHT-SIDED THORACIC BACK PAIN: ICD-10-CM

## 2019-02-04 DIAGNOSIS — M54.6 CHRONIC RIGHT-SIDED THORACIC BACK PAIN: ICD-10-CM

## 2019-02-04 DIAGNOSIS — J45.20 MILD INTERMITTENT ASTHMA WITHOUT COMPLICATION: ICD-10-CM

## 2019-02-04 DIAGNOSIS — Z76.0 MEDICATION REFILL: ICD-10-CM

## 2019-02-04 PROCEDURE — 99214 OFFICE O/P EST MOD 30 MIN: CPT | Mod: S$GLB,,, | Performed by: FAMILY MEDICINE

## 2019-02-04 PROCEDURE — 99214 PR OFFICE/OUTPT VISIT, EST, LEVL IV, 30-39 MIN: ICD-10-PCS | Mod: S$GLB,,, | Performed by: FAMILY MEDICINE

## 2019-02-04 RX ORDER — FLUOXETINE HYDROCHLORIDE 40 MG/1
40 CAPSULE ORAL DAILY
Qty: 90 CAPSULE | Refills: 3 | Status: SHIPPED | OUTPATIENT
Start: 2019-02-04 | End: 2020-07-14 | Stop reason: SDUPTHER

## 2019-02-04 RX ORDER — BUDESONIDE AND FORMOTEROL FUMARATE DIHYDRATE 160; 4.5 UG/1; UG/1
2 AEROSOL RESPIRATORY (INHALATION) EVERY 12 HOURS
Qty: 3 INHALER | Refills: 3 | Status: SHIPPED | OUTPATIENT
Start: 2019-02-04 | End: 2019-11-06 | Stop reason: SDUPTHER

## 2019-02-04 RX ORDER — ATORVASTATIN CALCIUM 20 MG/1
20 TABLET, FILM COATED ORAL DAILY
Qty: 90 TABLET | Refills: 3 | Status: SHIPPED | OUTPATIENT
Start: 2019-02-04 | End: 2020-07-14 | Stop reason: SDUPTHER

## 2019-02-04 RX ORDER — FLUTICASONE PROPIONATE 50 MCG
1 SPRAY, SUSPENSION (ML) NASAL DAILY
Qty: 3 BOTTLE | Refills: 3 | Status: SHIPPED | OUTPATIENT
Start: 2019-02-04 | End: 2020-03-04 | Stop reason: SDUPTHER

## 2019-02-04 RX ORDER — MONTELUKAST SODIUM 10 MG/1
10 TABLET ORAL NIGHTLY
Qty: 90 TABLET | Refills: 3 | Status: SHIPPED | OUTPATIENT
Start: 2019-02-04 | End: 2020-07-14 | Stop reason: SDUPTHER

## 2019-02-04 RX ORDER — ALBUTEROL SULFATE 90 UG/1
2 AEROSOL, METERED RESPIRATORY (INHALATION) EVERY 6 HOURS PRN
Qty: 3 INHALER | Refills: 0 | Status: SHIPPED | OUTPATIENT
Start: 2019-02-04 | End: 2023-08-30

## 2019-02-04 RX ORDER — OMEPRAZOLE 40 MG/1
40 CAPSULE, DELAYED RELEASE ORAL DAILY
Qty: 90 CAPSULE | Refills: 0 | Status: SHIPPED | OUTPATIENT
Start: 2019-02-04 | End: 2020-06-11 | Stop reason: SDUPTHER

## 2019-02-04 NOTE — PROGRESS NOTES
Subjective:       Patient ID: Zuly Person is a 68 y.o. female.    Chief Complaint: Establish Care; Medication Refill; and Consult for Referrals    HPI   Ms. Person presents for family medicine follow-up. Has chronic cough and has seen pulmonology for this. Was diagnosed with asthma and is taking her medications but reports her cough has not improved. Had similar symptoms ~20 years ago when she was first diagnosed with acid reflux but has now been on omeprazole since then. Is due for a colonoscopy and would like to see GI; last scope was ~6 years ago and she was told to have one every 5 years.    Reports she's started working out again and has had right sided upper back spasms with certain movements. At one point had to take a valium for the spasms. Has had a bilateral mastectomy with latissimus dorsi flap.    Review of Systems   Constitutional: Negative for chills, diaphoresis, fatigue and fever.   Respiratory: Positive for cough. Negative for shortness of breath, wheezing and stridor.    Cardiovascular: Negative for chest pain, palpitations and leg swelling.   Musculoskeletal: Positive for myalgias. Negative for back pain, gait problem and joint swelling.       Past Medical History:   Diagnosis Date    Anxiety     Cancer     breast cancer     Past Surgical History:   Procedure Laterality Date    BREAST SURGERY      implants    CHOLECYSTECTOMY      HYSTERECTOMY      MASTECTOMY       Social History     Socioeconomic History    Marital status:      Spouse name: Not on file    Number of children: Not on file    Years of education: Not on file    Highest education level: Not on file   Social Needs    Financial resource strain: Not on file    Food insecurity - worry: Not on file    Food insecurity - inability: Not on file    Transportation needs - medical: Not on file    Transportation needs - non-medical: Not on file   Occupational History    Not on file   Tobacco Use    Smoking status: Former  "Smoker     Packs/day: 1.00     Years: 15.00     Pack years: 15.00     Types: Cigarettes     Last attempt to quit:      Years since quittin.1    Smokeless tobacco: Never Used   Substance and Sexual Activity    Alcohol use: Yes     Frequency: 4 or more times a week     Drinks per session: 1 or 2     Binge frequency: Never    Drug use: No    Sexual activity: No   Other Topics Concern    Not on file   Social History Narrative    Not on file     History reviewed. No pertinent family history.    Objective:      /80 (BP Location: Left arm, Patient Position: Sitting, BP Method: Medium (Automatic))   Pulse 74   Resp 19   Ht 5' 6" (1.676 m)   Wt 87.5 kg (192 lb 12.8 oz)   LMP  (LMP Unknown)   SpO2 95%   BMI 31.12 kg/m²   Physical Exam   Constitutional: She is oriented to person, place, and time. She appears well-developed and well-nourished. No distress.   Cardiovascular: Normal rate, regular rhythm and normal heart sounds.   No murmur heard.  Pulmonary/Chest: Effort normal and breath sounds normal. No stridor. No respiratory distress.   Neurological: She is alert and oriented to person, place, and time.   Skin: She is not diaphoretic.   Psychiatric: She has a normal mood and affect. Her behavior is normal. Judgment and thought content normal.   Vitals reviewed.      Assessment:       1. Gastroesophageal reflux disease, esophagitis presence not specified    2. Mild intermittent asthma without complication    3. Chronic sinus complaints    4. Chronic cough    5. Medication refill    6. Screening for malignant neoplasm of colon    7. Chronic right-sided thoracic back pain        Plan:       Gastroesophageal reflux disease, esophagitis presence not specified  -     omeprazole (PRILOSEC) 40 MG capsule; Take 1 capsule (40 mg total) by mouth once daily.  Dispense: 90 capsule; Refill: 0  -     Ambulatory referral to Gastroenterology    Mild intermittent asthma without complication  -     Stable; see " refills below  -     albuterol (PROVENTIL/VENTOLIN HFA) 90 mcg/actuation inhaler; Inhale 2 puffs into the lungs every 6 (six) hours as needed for Wheezing. Rescue  Dispense: 3 Inhaler; Refill: 0  -     montelukast (SINGULAIR) 10 mg tablet; Take 1 tablet (10 mg total) by mouth every evening.  Dispense: 90 tablet; Refill: 3  -     budesonide-formoterol 160-4.5 mcg (SYMBICORT) 160-4.5 mcg/actuation HFAA; Inhale 2 puffs into the lungs every 12 (twelve) hours. Controller  Dispense: 3 Inhaler; Refill: 3    Chronic cough  -     See refills below; patient to see GI to see if acid reflux is contributing  -     fluticasone (FLONASE) 50 mcg/actuation nasal spray; 1 spray (50 mcg total) by Each Nare route once daily.  Dispense: 3 Bottle; Refill: 3  -     montelukast (SINGULAIR) 10 mg tablet; Take 1 tablet (10 mg total) by mouth every evening.  Dispense: 90 tablet; Refill: 3    Medication refill  -     atorvastatin (LIPITOR) 20 MG tablet; Take 1 tablet (20 mg total) by mouth once daily.  Dispense: 90 tablet; Refill: 3  -     FLUoxetine 40 MG capsule; Take 1 capsule (40 mg total) by mouth once daily.  Dispense: 90 capsule; Refill: 3    Screening for malignant neoplasm of colon  -     Ambulatory referral to Gastroenterology    Chronic right-sided thoracic back pain  -     Ambulatory consult to Physical Therapy            Risks, benefits, and side effects were discussed with the patient. All questions were answered to the fullest satisfaction of the patient, and pt verbalized understanding and agreement to treatment plan. Pt was to call with any new or worsening symptoms, or present to the ER.

## 2019-02-06 ENCOUNTER — CLINICAL SUPPORT (OUTPATIENT)
Dept: REHABILITATION | Facility: HOSPITAL | Age: 69
End: 2019-02-06
Attending: FAMILY MEDICINE
Payer: MEDICARE

## 2019-02-06 DIAGNOSIS — M54.6 ACUTE RIGHT-SIDED THORACIC BACK PAIN: Primary | ICD-10-CM

## 2019-02-06 PROCEDURE — 97161 PT EVAL LOW COMPLEX 20 MIN: CPT

## 2019-02-06 PROCEDURE — G8979 MOBILITY GOAL STATUS: HCPCS | Mod: CI

## 2019-02-06 PROCEDURE — G8978 MOBILITY CURRENT STATUS: HCPCS | Mod: CL

## 2019-02-06 PROCEDURE — 97140 MANUAL THERAPY 1/> REGIONS: CPT

## 2019-02-06 NOTE — PLAN OF CARE
[facility]  Physical Therapy Initial Evaluation    Name: Zuly Person  Clinic Number: [id number]    Therapy Diagnosis:   Encounter Diagnosis   Name Primary?    Acute right-sided thoracic back pain Yes     Physician: Lucrecia De La Cruz DO    Physician Orders: PT Eval and Treat   Medical Diagnosis: thoracic pain  Evaluation Date: 2/6/2019  Authorization period Expiration: 05/6/19  Plan of Care Certification Period: 05/6/19    Visit #: 1/ Visits authorized: 12  Time In:1115  Time Out: 1200  Total Billable Time: 45 minutes    Precautions: cancer, history of breast    Subjective   Date of onset: since surgery  Date of Surgery: 2015    Past Medical History:   Diagnosis Date    Anxiety     Cancer     breast cancer     Zuly Person  has a past surgical history that includes Cholecystectomy; Hysterectomy; Breast surgery; and Mastectomy.    Zuly has a current medication list which includes the following prescription(s): albuterol, aspirin, atorvastatin, azithromycin, budesonide-formoterol 160-4.5 mcg, fluoxetine, fluticasone, meloxicam, montelukast, omeprazole, prednisone, and virtussin ac.    Review of patient's allergies indicates:  No Known Allergies     Imaging, none:     Prior Therapy: none for current condition  Occupation: part time legal secratary  Prior Level of Function: no limitations with use of shoulders, patient has been having this pain since surgery, only when she bends over  Current Level of Function: worsening of symptoms with muscle spasms    Pain:  Current 0/10, worst 6/10, best 0/10   Location: from medial right scapula to right breast, laterally  right  Description: Grabbing, Deep and Sharp  Aggravating Factors: Bending  Easing Factors: sitting upright      Onset/BLADIMIR: since surgery    History of current condition - Zuly reports: 4 year history of symptoms, including tightness and pain when bending over. Zuly had breast reconstruction surgery following mastectomy in which the lat  dorsi was used as a flap for reconstruction.     Pts goals: relieve pain with all movements, review exercise program        Objective     Posture:     -       Rounded shoulders  -       Affected scapula elevated  Decreased mobility of right scap in relation to left with overhead arm movements.     Shoulder Range of Motion:   Left active Left Passive Right active  Right Passive   Flexion 160 160 160 160   Abduction 160 160 160 160   Extension 60 60 60 60   Ext. Rotation 95 98 80 88   Int. Rotation 70 70 60 62         Upper Extremity Strength  (R) UE  (L) UE    Shoulder flexion: 5/5 Shoulder flexion: 5/5   Shoulder Abduction: 5/5 Shoulder abduction: 5/5   Shoulder ER 5/5 Shoulder ER 5/5   Shoulder IR 5/5 Shoulder IR 5/5   Lower Trap 3+/5 Lower Trap 3+/5   Middle Trap 3+/5 Middle Trap 3+/5   Rhomboids 4+/5 Rhomboids 4+/5     Special Tests:   Left Right   AC Joint Compression Test Negative Negative   Empty Can Test Negative Negative   Drop Arm test Negative Negative   Subscaputlaris Lift Off Negative Negative   Clunk test Negative Negative   O'clifford's test Negative Negative   Hawkin's Kenndy Negative Negative   Neer's Test Negative Negative   Speed's test Negative Negative   Anterior Apprehension test Negative Negative   Relocation test Negative Negative     Joint mobility: restricted, minimally    Palpation:no tenderness to palpation at lat dorsi, scapula border, or lat flap    Sensation: intact    Flexibility:     Upper Trap = R minimal restriction, L minimal restriction   Scalenes: R minimal restriction, L minimal restriction   SCM: R minimal restriction, L minimal restriction   Levator Scap: R minimal restriction, L minimal restriction    Scapular Control/Dyskinesis:    Normal / Subtle / Obvious  Comments    Left  subtle    Right  subtle        PT Evaluation Completed? Yes  Discussed Plan of Care with patient: Yes    TREATMENT   Zuly received the following manual therapy techniques: Joint mobilizations, Soft tissue  Mobilization and stretch of latissimus and PROM of R shoulder flexion, IR, ER for 10  minutes.     Home Exercises and Patient Education Provided    Education provided re:   - progress towards goals   - role of therapy in multi - disciplinary team, goals for therapy  Pt educated on condition, POC, and expectations in therapy.  No spiritual or educational barriers to learning provided    Home exercises:  Pt will be provided HEP during course of treatment with progressions as appropriate. Pt was advised to perform these exercises free of pain, and to stop performing them if pain occurs.   Zuly demonstrated good  understanding of the education provided.       Functional Limitations Reports - G Codes  Category: Mobility  Tool: VAS  Score: 6/10 (60%)  Current:CL = least 60% but < 80% impaired, limited or restricted  Goal:CI = at least 1% but < 20% impaired, limited or restricted      Assessment   Zuly is a 68 y.o. female referred to outpatient physical therapy and presents to PT with pain limiting function . Patient demonstrates limitations as described in the problem list. Pt will benefit from physcial therapy services in order to maximize pain free and/or functional use of right UE. The following goals were discussed with the patient and patient is in agreement with them as to be addressed in the treatment plan.   Pt prognosis is Good.   Pt will benefit from skilled outpatient Physical Therapy to address the deficits stated above and in the chart below, provide pt/family education, and to maximize pt's level of independence.     Plan of care discussed with patient: Yes  Pt's spiritual, cultural and educational needs considered and pt agreeable to plan of care and goals as stated below:     Anticipated Barriers for therapy: none    Medical necessity is demonstrated by the following IMPAIRMENTS/PROBLEM LIST:    pain, decreased ROM and impaired muscle length    GOALS:     Long Term Goals: 6 weeks  Pain: Decrease pain to  2/10 10% of the time to allow for full functional mobility  Strength: Improve strength in bilateral shoulder and periscapular muscles to 5/5 for improved shoulder stability  ROM: Improve ROM to 100% of normal limits in R shoulder flexion, IR, ER  Postures: Demonstrate ability to bend forward without pain 90% of the time  Exercise: demonstrate independence with home exercise program to maintain gains made in therapy.          Plan   Certification Period: 2/6/2019 to 5/6/19.    Outpatient physical therapy 2 times weekly to include: Manual Therapy, Moist Heat/ Ice, Therapeutic Activites, Therapeutic Exercise and Ultrasound. Cont PT for 2 months.   Pt may be seen by PTA as part of the rehabilitation team.     I certify the need for these services furnished under this plan of treatment and while under my care.    Hanane Cadet, PT

## 2019-02-11 ENCOUNTER — CLINICAL SUPPORT (OUTPATIENT)
Dept: REHABILITATION | Facility: HOSPITAL | Age: 69
End: 2019-02-11
Attending: FAMILY MEDICINE
Payer: MEDICARE

## 2019-02-11 DIAGNOSIS — M54.6 THORACIC BACK PAIN, UNSPECIFIED BACK PAIN LATERALITY, UNSPECIFIED CHRONICITY: Primary | ICD-10-CM

## 2019-02-11 PROCEDURE — 97140 MANUAL THERAPY 1/> REGIONS: CPT

## 2019-02-11 PROCEDURE — 97110 THERAPEUTIC EXERCISES: CPT

## 2019-02-11 NOTE — PROGRESS NOTES
"                                                    Physical Therapy Daily Note     Name: Zuly Person  Clinic Number: [id number]  Diagnosis:   Encounter Diagnosis   Name Primary?    Thoracic back pain, unspecified back pain laterality, unspecified chronicity Yes     Physician: Lucrecia De La Cruz DO  Precautions: none  Visit #: 2 of 12  PTA Visit #: 0  Time In: 0302  Time Out: 0350    Subjective     Pt reports: One incident of "catching" when she bent over to reach something from the floor.   Pain Scale: Zuly rates pain on a scale of 0-10 to be 0 currently.    Objective     Zuly received individual therapeutic exercises to develop ROM and flexibility for 12 minutes including:    Date        Exercise Sets x Reps Resistance    Sets x Reps Resistance    Sets x Reps Resistance    Sets x Reps Resistance    Sets x Reps Resistance      Pulley flexion 4 min       Pulley abduction 2 min       Scapular retractions X20/ GTB       Shoulder extension x20 RTB       Wall push up ---->       IR walkout ---->       ER walkout ---->             Zuly received the following manual therapy techniques: Joint mobilizations, Myofacial release and Soft tissue Mobilization were applied to the: right shoulder GH joint glides gr II-III f/b PROM to R shoulder with mod overpressure at end range; manual lat stretch for; rib springing, and soft tissue mobilization to right lat for 35 minutes      Home Exercises Provided: no exercises given at this visit    Pt demo good understanding of the education provided. Zuly demonstrated good return demonstration of activities.     Education provided re:  Zuly verbalized good understanding of education provided.   No spiritual or educational barriers to learning provided    Assessment     Patient tolerated treatment well. Pt completing treatment without pain. Pt completing treatment without complaint.    patient continues with tightness at incision and upper lat dorsi. Pt with good tolerance to treatment this date. "        Plan     Continue with established Plan of Care towards PT goals.    Therapist: Hanane Cadet, PT  2/11/2019

## 2019-02-13 ENCOUNTER — CLINICAL SUPPORT (OUTPATIENT)
Dept: REHABILITATION | Facility: HOSPITAL | Age: 69
End: 2019-02-13
Attending: FAMILY MEDICINE
Payer: MEDICARE

## 2019-02-13 DIAGNOSIS — M54.6 THORACIC BACK PAIN, UNSPECIFIED BACK PAIN LATERALITY, UNSPECIFIED CHRONICITY: ICD-10-CM

## 2019-02-13 PROCEDURE — 97140 MANUAL THERAPY 1/> REGIONS: CPT

## 2019-02-13 PROCEDURE — 97110 THERAPEUTIC EXERCISES: CPT

## 2019-02-13 NOTE — PROGRESS NOTES
"                                                    Physical Therapy Daily Note     Name: Zuly Person  Clinic Number: [id number]  Diagnosis:   Encounter Diagnosis   Name Primary?    Thoracic back pain, unspecified back pain laterality, unspecified chronicity      Physician: Lucrecia De La Cruz DO  Precautions: none  Visit #: 2 of 12  PTA Visit #: 0  Time In: 1105  Time Out: 1155    Subjective     Pt reports: her arm feels tighter today and states she had 2 "catches" today.   Pain Scale: Zuly rates pain on a scale of 0-10 to be 0 currently.    Objective     Zuly received individual therapeutic exercises to develop ROM and flexibility for 15 minutes including:    Date 2/11/19 2/13/19      Exercise Sets x Reps Resistance    Sets x Reps Resistance    Sets x Reps Resistance    Sets x Reps Resistance    Sets x Reps Resistance      Pulley flexion 4 min 2 min      Pulley abduction 2 min 2 min      Scapular retractions X20/ GTB 2x15/ GTB      Shoulder extension x20 RTB 3x10/ RTB      Wall push up ----> x15      IR walkout ----> RTB x 10      ER walkout ----> RTB x 10            Zuly received the following manual therapy techniques: Joint mobilizations, Myofacial release and Soft tissue Mobilization were applied to the: right shoulder GH joint glides gr II-III f/b PROM to R shoulder with mod overpressure at end range; manual lat stretch for; rib springing, and soft tissue mobilization to right lat for 35 minutes      Home Exercises Provided: no exercises given at this visit    Pt demo good understanding of the education provided. Zuly demonstrated good return demonstration of activities.     Education provided re:  Zuly verbalized good understanding of education provided.   No spiritual or educational barriers to learning provided    Assessment     Patient tolerated treatment well. Pt completing treatment without pain. Pt completing treatment without complaint.    patient continues with tightness at incision and upper lat dorsi. Pt " with good tolerance to treatment this date.        Plan     Continue with established Plan of Care towards PT goals.    Therapist: Hanane Cadet, PT  2/13/2019

## 2019-02-18 ENCOUNTER — OFFICE VISIT (OUTPATIENT)
Dept: PODIATRY | Facility: CLINIC | Age: 69
End: 2019-02-18
Payer: MEDICARE

## 2019-02-18 VITALS
WEIGHT: 192 LBS | DIASTOLIC BLOOD PRESSURE: 72 MMHG | HEART RATE: 74 BPM | HEIGHT: 66 IN | SYSTOLIC BLOOD PRESSURE: 148 MMHG | BODY MASS INDEX: 30.86 KG/M2 | TEMPERATURE: 97 F

## 2019-02-18 DIAGNOSIS — D36.10 NEUROMA: Primary | ICD-10-CM

## 2019-02-18 DIAGNOSIS — Q66.70 PES CAVUS, CONGENITAL: ICD-10-CM

## 2019-02-18 PROCEDURE — 99999 PR PBB SHADOW E&M-EST. PATIENT-LVL III: ICD-10-PCS | Mod: PBBFAC,,, | Performed by: PODIATRIST

## 2019-02-18 PROCEDURE — 99213 OFFICE O/P EST LOW 20 MIN: CPT | Mod: PBBFAC | Performed by: PODIATRIST

## 2019-02-18 PROCEDURE — 99214 PR OFFICE/OUTPT VISIT, EST, LEVL IV, 30-39 MIN: ICD-10-PCS | Mod: S$PBB,,, | Performed by: PODIATRIST

## 2019-02-18 PROCEDURE — 99999 PR PBB SHADOW E&M-EST. PATIENT-LVL III: CPT | Mod: PBBFAC,,, | Performed by: PODIATRIST

## 2019-02-18 PROCEDURE — 99214 OFFICE O/P EST MOD 30 MIN: CPT | Mod: S$PBB,,, | Performed by: PODIATRIST

## 2019-02-22 ENCOUNTER — TELEPHONE (OUTPATIENT)
Dept: PODIATRY | Facility: CLINIC | Age: 69
End: 2019-02-22

## 2019-02-22 DIAGNOSIS — D36.10 NEUROMA: Primary | ICD-10-CM

## 2019-02-22 RX ORDER — DICLOFENAC SODIUM 75 MG/1
75 TABLET, DELAYED RELEASE ORAL 2 TIMES DAILY
Qty: 60 TABLET | Refills: 1 | Status: SHIPPED | OUTPATIENT
Start: 2019-02-22 | End: 2019-02-22 | Stop reason: SDUPTHER

## 2019-02-22 RX ORDER — DICLOFENAC SODIUM 75 MG/1
TABLET, DELAYED RELEASE ORAL
Qty: 180 TABLET | Refills: 1 | Status: SHIPPED | OUTPATIENT
Start: 2019-02-22 | End: 2023-08-30

## 2019-02-22 NOTE — TELEPHONE ENCOUNTER
----- Message from Kai Odom sent at 2/22/2019  9:07 AM CST -----  Contact: pt  Type:  RX Refill Request    Who Called:  pt  Refill or New Rx:  NEW  RX Name and Strength:  Pt couldn't remember what was discussed during her visit with the Dr   How is the patient currently taking it? (ex. 1XDay):    Is this a 30 day or 90 day RX:  30  Preferred Pharmacy with phone number:    [facility] [place] [place]  Phone: [phone]    Local or Mail Order:    Ordering Provider:    Best Call Back Number:  [phone]  Additional Information:

## 2019-02-22 NOTE — TELEPHONE ENCOUNTER
Pt states the insoles have helped. She is in [place] at a wedding and she states she is experiencing pain in her foot. Pt states she takes mobic and this is not helping. She wants to know if there is any other medication she can try and if so be sent to pharmacy listed on her message

## 2019-02-23 NOTE — PROGRESS NOTES
Subjective:       Patient ID: Zuly Person is a 68 y.o. female.    Chief Complaint: Follow-up; Foot Pain; and Foot Problem    Foot Pain   Associated symptoms include arthralgias and joint swelling.    patient presents with a complaint of pain in the balls of her feet the left is slightly worse than the right this has been going on for 6-7 months and has gotten progressively worse patient relates a burning type pain.  Patient states when she does aerobics she really does not have any pain however when she is at work she experiences the pain more often she states that she was wearing a lot of flip-flops at the time that this started she does not go barefoot at all.  Patient states overall it is a little bit better but not completely resolved and seem to do much better when I 1st saw her last with the new supports than it is helping now.  Review of Systems   Musculoskeletal: Positive for arthralgias and joint swelling.   All other systems reviewed and are negative.      Objective:      Physical Exam   Constitutional: She appears well-developed and well-nourished.   Cardiovascular:   Pulses:       Dorsalis pedis pulses are 2+ on the right side, and 2+ on the left side.        Posterior tibial pulses are 1+ on the right side, and 1+ on the left side.   Pulmonary/Chest: Effort normal.   Musculoskeletal: Normal range of motion. She exhibits deformity.        Right foot: There is deformity.        Left foot: There is deformity.   Feet:   Right Foot:   Protective Sensation: 4 sites tested. 4 sites sensed.   Skin Integrity: Positive for warmth and callus.   Left Foot:   Protective Sensation: 4 sites tested. 4 sites sensed.   Skin Integrity: Positive for warmth and callus.   Neurological: She is alert.   Skin: Skin is warm. Capillary refill takes 2 to 3 seconds.   Psychiatric: She has a normal mood and affect. Her behavior is normal. Judgment and thought content normal.   Nursing note and vitals reviewed.      Assessment:        1. Neuroma    2. Pes cavus, congenital        Plan:       Following evaluation patient indicated that her foot is definitely doing better on the right side she is not having as much neuroma type pain she was having she states the 1st week and was doing a lot better that is kind of decreased a little bit and she has had more pain recently patient states she is having to take some lower her some Lortab that she had left over at night at bed time because of the nerve discomfort.  Patient was advised I am going to have her continue to take the Mobic as directed I have advised her it is obviously be that she needs more support on her power steps I added additional blue arch padding bilateral I have advised her I will add a metatarsal pad if necessary if she is not doing better she has a very high arches and I have advised her it is very important that we support these arches to take pressure off the forefoot thus relieving the neuroma pain.  Patient was in agreement and understanding of this.  Subsequently patient contacted the office states she is doing a lot better with the extra arch support but she does not feel that the Mobic is helping her very much I did send in a prescription for diclofenac patient is currently traveling out of state at this time so it was sent to a pharmacy in [facility].  Plan follow-up 2-3 weeks to re-evaluate the patient's see how she is doing I have advised her she needs to be completely pain-free before she returns to full activity I have also considered possibly using gabapentin for her at night if she continues to have problems with discomfort at night.  Total face-to-face time including discussion evaluation and treatment equaled 30 min.  This includes making adjustments and additions to the patient's arch supports.

## 2019-02-25 ENCOUNTER — CLINICAL SUPPORT (OUTPATIENT)
Dept: REHABILITATION | Facility: HOSPITAL | Age: 69
End: 2019-02-25
Attending: FAMILY MEDICINE
Payer: MEDICARE

## 2019-02-25 DIAGNOSIS — M54.6 THORACIC BACK PAIN, UNSPECIFIED BACK PAIN LATERALITY, UNSPECIFIED CHRONICITY: ICD-10-CM

## 2019-02-25 PROCEDURE — 97140 MANUAL THERAPY 1/> REGIONS: CPT

## 2019-02-25 PROCEDURE — 97110 THERAPEUTIC EXERCISES: CPT

## 2019-02-25 NOTE — PROGRESS NOTES
Physical Therapy Daily Note     Name: Zuly Person  Clinic Number: [id number]  Diagnosis:   Encounter Diagnosis   Name Primary?    Thoracic back pain, unspecified back pain laterality, unspecified chronicity      Physician: Lucrecia De La Cruz DO  Precautions: none  Visit #: 4 of 12  PTA Visit #: 0  Time In: 1105  Time Out: 1155    Subjective     Pt reports: pain has moved from lower than scar to more at the scar line. Pt states pain is less frequent and intense.    Pain Scale: Zuly rates pain on a scale of 0-10 to be 0 currently.    Objective     Zuly received individual therapeutic exercises to develop ROM and flexibility for 23 minutes including:    Date 2/11/19 2/13/19 2/25/19     Exercise Sets x Reps Resistance    Sets x Reps Resistance    Sets x Reps Resistance    Sets x Reps Resistance    Sets x Reps Resistance      Pulley flexion 4 min 2 min 3 min     Pulley abduction 2 min 2 min 3 min     Scapular retractions X20/ GTB 2x15/ GTB 2x15/ GTB     Shoulder extension x20 RTB 3x10/ RTB 3x10/ GTB     Wall push up ----> x15 x20     IR walkout ----> RTB x 10 RTB 2x10     ER walkout ----> RTB x 10 RTB 2x10     Horizontal abduction   RTB 2x10                           Zuly received the following manual therapy techniques: Joint mobilizations, Myofacial release and Soft tissue Mobilization were applied to the: right shoulder,  manual lat stretch for; rib springing, and soft tissue mobilization to right lat for 15 minutes      Home Exercises Provided:   Pt demo good understanding of the education provided. Zuly demonstrated good return demonstration of activities.     Education provided re:  Zuly verbalized good understanding of education provided.   No spiritual or educational barriers to learning provided    Assessment     Patient tolerated treatment well. Pt completing treatment without pain. Pt completing treatment without complaint.    patient continues with tightness at  incision and upper lat dorsi. Pt with good tolerance to treatment this date.        Plan     Continue with established Plan of Care towards PT goals.    Therapist: Hanane Cadet, PT  2/25/2019

## 2019-02-27 ENCOUNTER — CLINICAL SUPPORT (OUTPATIENT)
Dept: REHABILITATION | Facility: HOSPITAL | Age: 69
End: 2019-02-27
Attending: FAMILY MEDICINE
Payer: MEDICARE

## 2019-02-27 DIAGNOSIS — M54.6 THORACIC BACK PAIN, UNSPECIFIED BACK PAIN LATERALITY, UNSPECIFIED CHRONICITY: ICD-10-CM

## 2019-02-27 PROCEDURE — 97140 MANUAL THERAPY 1/> REGIONS: CPT

## 2019-02-27 PROCEDURE — 97110 THERAPEUTIC EXERCISES: CPT

## 2019-02-27 NOTE — PROGRESS NOTES
Physical Therapy Daily Note     Name: Zuly Person  Clinic Number: [id number]  Diagnosis:   Encounter Diagnosis   Name Primary?    Thoracic back pain, unspecified back pain laterality, unspecified chronicity      Physician: Lucrecia De La Cruz DO  Precautions: none  Visit #: 5 of 12  PTA Visit #: 0  Time In: 0809  Time Out: 1855    Subjective     Pt reports: no catching or grabbing pain since last session. Patient informs PT at end of session that she will be in [place], [place] for one month.   Pain Scale: Zuly rates pain on a scale of 0-10 to be 0 currently.    Objective     Zuly received individual therapeutic exercises to develop ROM and flexibility for 30 minutes including:    Date 2/11/19 2/13/19 2/25/19 2/27/19    Exercise Sets x Reps Resistance    Sets x Reps Resistance    Sets x Reps Resistance    Sets x Reps Resistance    Sets x Reps Resistance      Pulley flexion 4 min 2 min 3 min 5 min    Pulley abduction 2 min 2 min 3 min 5 min    Scapular retractions X20/ GTB 2x15/ GTB 2x15/ GTB 3x10/ GTB    Shoulder extension x20 RTB 3x10/ RTB 3x10/ GTB 3x10/GTB    Wall push up ----> x15 x20 x20    IR walkout ----> RTB x 10 RTB 2x10 RTB 3x10    ER walkout ----> RTB x 10 RTB 2x10 RTB 3x10    Horizontal abduction   RTB 2x10 RTB 3x10    Prone mid row    x10    Prone T    x10          Zuly received the following manual therapy techniques: Joint mobilizations, Myofacial release and Soft tissue Mobilization were applied to the: right shoulder,  manual lat stretch for; rib springing, and soft tissue mobilization to right lat for 15 minutes      Home Exercises Provided: written HEP emailed, theraband of various resistances given to patient.   Pt demo good understanding of the education provided. Zuly demonstrated good return demonstration of activities.     Education provided re:  Zuly verbalized good understanding of education provided.   No spiritual or educational barriers to learning  provided    Assessment     Patient tolerated treatment well. Pt completing treatment without pain. Pt completing treatment without complaint.    patient continues with tightness at incision and upper lat dorsi. Pt with good tolerance to treatment this date.    Patient demonstrating excellent progress toward goals. Pt to continue with therex as part of HEP while out of town.     Long Term Goals: 6 weeks  Pain: Decrease pain to 2/10 10% of the time to allow for full functional mobility- MET  Strength: Improve strength in bilateral shoulder and periscapular muscles to 5/5 for improved shoulder stability- In progress  ROM: Improve ROM to 100% of normal limits in R shoulder flexion, IR, ER- In progress  Postures: Demonstrate ability to bend forward without pain 90% of the time- In progress  Exercise: demonstrate independence with home exercise program to maintain gains made in therapy.      Plan     Patient on Hold while out of town. PT upon return.   If symptoms do not return upon patient's return she may be discharged onto HEP. Pt to follow up with Therapist:     Hanane Cadet, PT  2/27/2019

## 2019-03-26 ENCOUNTER — TELEPHONE (OUTPATIENT)
Dept: PODIATRY | Facility: CLINIC | Age: 69
End: 2019-03-26

## 2019-03-26 RX ORDER — DICLOFENAC SODIUM 75 MG/1
TABLET, DELAYED RELEASE ORAL
Qty: 60 TABLET | Refills: 0 | Status: SHIPPED | OUTPATIENT
Start: 2019-03-26 | End: 2019-12-18

## 2019-03-26 NOTE — TELEPHONE ENCOUNTER
----- Message from Altaf Atkins sent at 3/26/2019  1:17 PM CDT -----  Type: Needs Medical Advice    Who Called:  Patient  Best Call Back Number: [phone] (home)   Additional Information: Needs to speak to someone regarding anti-inflammatories. Please call to advise.

## 2019-04-16 NOTE — PROGRESS NOTES
Name: Zuly Person 1950  MRN: [id number]   Date: 4/16/2019    Therapy Diagnosis:   Encounter Diagnosis   Name Primary?    Thoracic back pain, unspecified back pain laterality, unspecified chronicity      Physician: Lucrecia De La Cruz DO      Subjective:  Patient reporting improvement since starting therapy. She had an oncology appt in [place] and was going to be in [place] for one month.      Objective:       Treatment :    Included:Therapeutic exercise, Joint mobilizations and Soft tissue mobilizations          Treatment today:  See Progress note on 2/27/19 for treatment notes          Assessment:    Patient has not returned to therapy following one month in [place].  Information required to complete an accurate discharge summary is unknown.  Refer to therapy initial evaluation and last progress note for initial and most recent functional status and goal achievement.  Recommendations made may be found in medical record.      Discharge plan : Discharge     Hanane Cadet PT

## 2019-04-23 ENCOUNTER — TELEPHONE (OUTPATIENT)
Dept: FAMILY MEDICINE | Facility: CLINIC | Age: 69
End: 2019-04-23

## 2019-04-23 DIAGNOSIS — J45.909 ASTHMA, UNSPECIFIED ASTHMA SEVERITY, UNSPECIFIED WHETHER COMPLICATED, UNSPECIFIED WHETHER PERSISTENT: Primary | ICD-10-CM

## 2019-04-23 NOTE — TELEPHONE ENCOUNTER
"Spoke with pt. States "I am in [place] for a few months. I need to see  Pulmonologist. I need a referral to a Dr here. I have asthma and I usually see Dr Palomino in [place] for this. I had a CXR done in [place] as well"    Dr Brandt Nina. Fax- [phone] Attn: Trina Corbett [address]   Phone- [phone]      Please advise.   ----- Message from Marcy Claudio sent at 4/23/2019  3:10 PM CDT -----  Contact: Patient  Type: Needs Medical Advice    Who Called: Patient  Best Call Back Number:   Additional Information: Calling to speak with the Nurse about requesting a referral to see a Pulmonologist in [place].  Please advise      "

## 2019-05-02 DIAGNOSIS — Z12.11 COLON CANCER SCREENING: ICD-10-CM

## 2019-05-03 DIAGNOSIS — Z11.59 NEED FOR HEPATITIS C SCREENING TEST: ICD-10-CM

## 2019-09-20 DIAGNOSIS — R05.3 CHRONIC COUGH: ICD-10-CM

## 2019-09-20 DIAGNOSIS — J45.20 MILD INTERMITTENT ASTHMA WITHOUT COMPLICATION: ICD-10-CM

## 2019-09-21 RX ORDER — PREDNISONE 20 MG/1
TABLET ORAL
Qty: 12 TABLET | Refills: 0 | Status: SHIPPED | OUTPATIENT
Start: 2019-09-21 | End: 2019-12-18

## 2019-11-06 DIAGNOSIS — J45.20 MILD INTERMITTENT ASTHMA WITHOUT COMPLICATION: ICD-10-CM

## 2019-11-06 RX ORDER — BUDESONIDE AND FORMOTEROL FUMARATE DIHYDRATE 160; 4.5 UG/1; UG/1
2 AEROSOL RESPIRATORY (INHALATION) EVERY 12 HOURS
Qty: 1 INHALER | Refills: 11 | Status: SHIPPED | OUTPATIENT
Start: 2019-11-06 | End: 2022-04-12

## 2019-12-18 ENCOUNTER — HOSPITAL ENCOUNTER (EMERGENCY)
Facility: HOSPITAL | Age: 69
Discharge: HOME | End: 2019-12-18
Attending: INTERNAL MEDICINE
Payer: MEDICARE

## 2019-12-18 VITALS
DIASTOLIC BLOOD PRESSURE: 80 MMHG | RESPIRATION RATE: 20 BRPM | HEIGHT: 66 IN | TEMPERATURE: 98 F | OXYGEN SATURATION: 97 % | BODY MASS INDEX: 32.62 KG/M2 | HEART RATE: 74 BPM | WEIGHT: 203 LBS | SYSTOLIC BLOOD PRESSURE: 138 MMHG

## 2019-12-18 DIAGNOSIS — R07.9 CHEST PAIN: ICD-10-CM

## 2019-12-18 DIAGNOSIS — R07.2 PRECORDIAL PAIN: Primary | ICD-10-CM

## 2019-12-18 LAB
ALBUMIN SERPL BCP-MCNC: 3.8 G/DL (ref 3.5–5.2)
ALP SERPL-CCNC: 77 U/L (ref 55–135)
ALT SERPL W/O P-5'-P-CCNC: 16 U/L (ref 10–44)
ANION GAP SERPL CALC-SCNC: 5 MMOL/L (ref 8–16)
AST SERPL-CCNC: 18 U/L (ref 10–40)
BASOPHILS # BLD AUTO: 0.02 K/UL (ref 0–0.2)
BASOPHILS NFR BLD: 0.3 % (ref 0–1.9)
BILIRUB SERPL-MCNC: 0.6 MG/DL (ref 0.1–1)
BNP SERPL-MCNC: 78 PG/ML (ref 0–99)
BUN SERPL-MCNC: 11 MG/DL (ref 8–23)
CALCIUM SERPL-MCNC: 8.7 MG/DL (ref 8.7–10.5)
CHLORIDE SERPL-SCNC: 103 MMOL/L (ref 95–110)
CO2 SERPL-SCNC: 30 MMOL/L (ref 23–29)
CREAT SERPL-MCNC: 0.6 MG/DL (ref 0.5–1.4)
DIFFERENTIAL METHOD: ABNORMAL
EOSINOPHIL # BLD AUTO: 0.1 K/UL (ref 0–0.5)
EOSINOPHIL NFR BLD: 1.3 % (ref 0–8)
ERYTHROCYTE [DISTWIDTH] IN BLOOD BY AUTOMATED COUNT: 12 % (ref 11.5–14.5)
EST. GFR  (AFRICAN AMERICAN): >60 ML/MIN/1.73 M^2
EST. GFR  (NON AFRICAN AMERICAN): >60 ML/MIN/1.73 M^2
GLUCOSE SERPL-MCNC: 97 MG/DL (ref 70–110)
HCT VFR BLD AUTO: 41.9 % (ref 37–48.5)
HGB BLD-MCNC: 13.8 G/DL (ref 12–16)
IMM GRANULOCYTES # BLD AUTO: 0.02 K/UL (ref 0–0.04)
IMM GRANULOCYTES NFR BLD AUTO: 0.3 % (ref 0–0.5)
LYMPHOCYTES # BLD AUTO: 1.1 K/UL (ref 1–4.8)
LYMPHOCYTES NFR BLD: 17.1 % (ref 18–48)
MCH RBC QN AUTO: 30.9 PG (ref 27–31)
MCHC RBC AUTO-ENTMCNC: 32.9 G/DL (ref 32–36)
MCV RBC AUTO: 94 FL (ref 82–98)
MONOCYTES # BLD AUTO: 0.7 K/UL (ref 0.3–1)
MONOCYTES NFR BLD: 10.9 % (ref 4–15)
NEUTROPHILS # BLD AUTO: 4.3 K/UL (ref 1.8–7.7)
NEUTROPHILS NFR BLD: 70.1 % (ref 38–73)
NRBC BLD-RTO: 0 /100 WBC
PLATELET # BLD AUTO: 118 K/UL (ref 150–350)
PMV BLD AUTO: 12.9 FL (ref 9.2–12.9)
POTASSIUM SERPL-SCNC: 3.7 MMOL/L (ref 3.5–5.1)
PROT SERPL-MCNC: 6.6 G/DL (ref 6–8.4)
RBC # BLD AUTO: 4.47 M/UL (ref 4–5.4)
SODIUM SERPL-SCNC: 138 MMOL/L (ref 136–145)
TROPONIN I SERPL DL<=0.01 NG/ML-MCNC: 0.02 NG/ML (ref 0.02–0.5)
WBC # BLD AUTO: 6.14 K/UL (ref 3.9–12.7)

## 2019-12-18 PROCEDURE — 83880 ASSAY OF NATRIURETIC PEPTIDE: CPT

## 2019-12-18 PROCEDURE — 71045 X-RAY EXAM CHEST 1 VIEW: CPT | Mod: 26,,, | Performed by: RADIOLOGY

## 2019-12-18 PROCEDURE — 71045 X-RAY EXAM CHEST 1 VIEW: CPT | Mod: TC,FY

## 2019-12-18 PROCEDURE — 84484 ASSAY OF TROPONIN QUANT: CPT

## 2019-12-18 PROCEDURE — 63600175 PHARM REV CODE 636 W HCPCS: Performed by: INTERNAL MEDICINE

## 2019-12-18 PROCEDURE — 99285 EMERGENCY DEPT VISIT HI MDM: CPT | Mod: 25

## 2019-12-18 PROCEDURE — 80053 COMPREHEN METABOLIC PANEL: CPT

## 2019-12-18 PROCEDURE — 36415 COLL VENOUS BLD VENIPUNCTURE: CPT

## 2019-12-18 PROCEDURE — 85025 COMPLETE CBC W/AUTO DIFF WBC: CPT

## 2019-12-18 PROCEDURE — 93005 ELECTROCARDIOGRAM TRACING: CPT

## 2019-12-18 PROCEDURE — 71045 XR CHEST AP PORTABLE: ICD-10-PCS | Mod: 26,,, | Performed by: RADIOLOGY

## 2019-12-18 RX ORDER — NITROGLYCERIN 0.4 MG/1
0.4 TABLET SUBLINGUAL
Status: COMPLETED | OUTPATIENT
Start: 2019-12-18 | End: 2019-12-18

## 2019-12-18 RX ADMIN — NITROGLYCERIN 0.4 MG: 0.4 TABLET SUBLINGUAL at 04:12

## 2019-12-18 NOTE — ED NOTES
Pt here with c/o CP while shopping in [place]. Pt states the paid began as a pressure in her chest and radiated into her jaw. Pt denies CP currently and states her  insisted on bringing her to the ED for eval.

## 2019-12-18 NOTE — DISCHARGE INSTRUCTIONS
Take nitroglycerin sublingual 1 every 5 min for future chest pain.    See primary care doctor as soon as possible to schedule stress testing and further cardiac workup

## 2019-12-18 NOTE — ED TRIAGE NOTES
15 minutes ago while at [employer] began having chest pain that radiates into jaw.  The pain has started to resolve now.

## 2019-12-18 NOTE — ED PROVIDER NOTES
Encounter Date: 2019       History     Chief Complaint   Patient presents with    Chest Pain     radiates to jaw, started 15 minutes ago     Patient comes in with substernal chest pain.  She was shopping in [place] when she had sudden onset of a 6/10 chest discomfort.  It lasted about 15 min.  She went sat down.  This slowly resolved.  By the time she arrived at the ER, the pain was mostly resolved.  At the current time and 0/10.  She had no shortness of breath.  No diaphoresis.  She has not had this pain before.  She is low risk for pulmonary embolus and the Wells criteria are negative.    Significant is the fact that the patient had breast cancer with radiation and chemotherapy to the right breast.  Is not certain if she had shielding of her chest and coronary arteries with the radiation therapy.    Arrived in the ED she was in no distress. She had a normal sinus rhythm with a normal rate.  Blood pressure was normal. She was not diaphoretic or tachypneic.    Carotid ultrasound done bedside shows is no plaque in the carotid arteries.        Review of patient's allergies indicates:  No Known Allergies  Past Medical History:   Diagnosis Date    Anxiety     Cancer     breast cancer     Past Surgical History:   Procedure Laterality Date    BREAST SURGERY      implants    CHOLECYSTECTOMY      HYSTERECTOMY      MASTECTOMY       History reviewed. No pertinent family history.  Social History     Tobacco Use    Smoking status: Former Smoker     Packs/day: 1.00     Years: 15.00     Pack years: 15.00     Types: Cigarettes     Last attempt to quit:      Years since quittin.9    Smokeless tobacco: Never Used   Substance Use Topics    Alcohol use: Yes     Frequency: 4 or more times a week     Drinks per session: 1 or 2     Binge frequency: Never    Drug use: No     Review of Systems   Constitutional: Negative for fever.   HENT: Negative for sore throat.    Respiratory: Negative for shortness of breath.     Cardiovascular: Negative for chest pain.   Gastrointestinal: Negative for nausea.   Genitourinary: Negative for dysuria.   Musculoskeletal: Negative for back pain.   Skin: Negative for rash.   Neurological: Negative for weakness.   Hematological: Does not bruise/bleed easily.   All other systems reviewed and are negative.      Physical Exam     Initial Vitals [12/18/19 1514]   BP Pulse Resp Temp SpO2   (!) 148/91 85 16 97.9 °F (36.6 °C) 97 %      MAP       --         Physical Exam    Nursing note and vitals reviewed.  Constitutional: Vital signs are normal. She appears well-developed and well-nourished. She is active and cooperative.   HENT:   Head: Normocephalic and atraumatic.   Eyes: Conjunctivae and lids are normal. Lids are everted and swept, no foreign bodies found.   Neck: Trachea normal, normal range of motion and full passive range of motion without pain. Neck supple.   Cardiovascular: Normal rate, regular rhythm, S1 normal, S2 normal, normal heart sounds, intact distal pulses and normal pulses.  No extrasystoles are present.    Pulmonary/Chest: Breath sounds normal.   Abdominal: Soft. Normal appearance and bowel sounds are normal.   Musculoskeletal: Normal range of motion.   Neurological: She is alert. She has normal reflexes. GCS eye subscore is 4. GCS verbal subscore is 5. GCS motor subscore is 6.   Skin: Skin is warm, dry and intact. Capillary refill takes less than 2 seconds.   Psychiatric: She has a normal mood and affect. Her speech is normal and behavior is normal. Cognition and memory are normal.         ED Course   Procedures  Labs Reviewed - No data to display  EKG Readings: (Independently Interpreted)   Initial Reading: No STEMI. Rhythm: Normal Sinus Rhythm. Ectopy: No Ectopy. Conduction: Normal. ST Segments: Normal ST Segments. T Waves: Normal. Axis: Normal. Clinical Impression: Normal Sinus Rhythm   Repeat EKG is also unchanged       Imaging Results    None       X-Rays:   Independently  Interpreted Readings:   Chest X-Ray: Normal heart size.  No infiltrates.  No acute abnormalities. Normal vessels.     Medical Decision Making:   Clinical Tests:   Lab Tests: Ordered and Reviewed  The following lab test(s) were unremarkable: CBC, CMP, Troponin and BNP       <> Summary of Lab: Lab studies are normal  Radiological Study: Ordered and Reviewed  Medical Tests: Ordered and Reviewed  ED Management:  EKG and chest x-ray are unremarkable.  Patient had a twinge of pain while she was in the ED, repeat EKG showed no changes.  Instructed the patient to keep nitroglycerin with her.  If pain recurs, take 1 nitroglycerin every 5 min until the pain is relieved.  Developing a pattern of relief is important diagnostically.  The patient has substantial pain she should go directly to [facility] if she is in no life threatening distress.    She see her primary physician for evaluation and set up for further cardiac testing such as stress test and echo.  Possibility of coronary disease both for age as well as post radiation therapy atherosclerosis this consideration.  This was all explained to the patient and her .                                 Clinical Impression:       ICD-10-CM ICD-9-CM   1. Precordial pain R07.2 786.51   2. Chest pain R07.9 786.50         Disposition:   Disposition: Discharged  Condition: Stable                     Rene Lemus MD  12/18/19 6557

## 2020-03-04 DIAGNOSIS — R05.3 CHRONIC COUGH: ICD-10-CM

## 2020-03-04 RX ORDER — FLUTICASONE PROPIONATE 50 MCG
2 SPRAY, SUSPENSION (ML) NASAL DAILY
Qty: 16 G | Refills: 11 | Status: SHIPPED | OUTPATIENT
Start: 2020-03-04 | End: 2023-09-12 | Stop reason: SDUPTHER

## 2020-05-13 ENCOUNTER — PATIENT OUTREACH (OUTPATIENT)
Dept: ADMINISTRATIVE | Facility: HOSPITAL | Age: 70
End: 2020-05-13

## 2020-05-28 ENCOUNTER — PATIENT OUTREACH (OUTPATIENT)
Dept: ADMINISTRATIVE | Facility: HOSPITAL | Age: 70
End: 2020-05-28

## 2020-05-28 NOTE — PROGRESS NOTES
Pre-visit Chart review notification  05/28/20  FAX SENT TO DR SADIE CASTELLANOS FOR MOST RECENT COLONOSCOPY  (P) [phone]  (F) [phone]

## 2020-05-28 NOTE — LETTER
FAX      AUTHORIZATION FOR RELEASE OF   CONFIDENTIAL INFORMATION        Dr. SADIE CASTELLANOS      We are seeing Zuly Person, date of birth 1950, in the clinic at [facility]. Lucrecia De La Cruz DO is the patient's PCP. Zuly Person has an outstanding lab/procedure at the time we reviewed her chart. In order to help keep her health information updated, she has authorized us to request the following medical record(s):        (  )  MAMMOGRAM                                      ( X )  COLONOSCOPY      (  )  PAP SMEAR                                          (  )  MOST RECENT LAB RESULTS     (  )  DEXA SCAN                                          (  )  DIABETIC EYE EXAM            (  )  DIABETIC FOOT EXAM                        (  )  MOST RECENT A1c, LIPID, &          URINE MICRO-ALBUMIN     (  )  OUTSIDE IMMUNIZATIONS                 (  )  _______________        Please fax records to [facility]  [phone]     If you have any questions, please contact Emily at [phone].      Emily Martinez L.P.N. Clinical Care Coordinator  [address] [place] [address], [place] [address] [phone]

## 2020-05-28 NOTE — LETTER
May 28, 2020    Zuly Craig [address] [place] [address]             [facility] [address] [facility] [address]  Phone: [phone] Dear [facility] is committed to your overall health and would like to ensure that you are up to date on your recommended test and/or procedures.   Lucrecia De La Cruz DO  has found that your chart shows you may be due for the following:    Health Maintenance Due   Topic Date Due    Hepatitis C Screening  1950    Lipid Panel  1950    TETANUS VACCINE  09/16/1968    DEXA SCAN  09/16/1990    Shingles Vaccine (1 of 2) 09/16/2000    Pneumococcal Vaccine (65+ Low/Medium Risk) (1 of 2 - PCV13) 09/16/2015     If you have had any of the above done at another facility, please let us know so that we may obtain copies from that facility.  If you have a copy of these records, please provide a copy for us to scan into your chart.  You are welcome to request that the report be faxed to us at  ([phone]).     Otherwise, please schedule these appointments at your earliest convenience by calling [phone] or going to [email or web address].    If you have an upcoming scheduled appointment for the item above, please disregard this letter.    Sincerely,  [facility]  DO Emily Kennedy L.P.N. Clinical Care Coordinator  [address] [place] [address], [place] [address]  [phone]  ]

## 2020-06-11 ENCOUNTER — OFFICE VISIT (OUTPATIENT)
Dept: FAMILY MEDICINE | Facility: CLINIC | Age: 70
End: 2020-06-11
Payer: MEDICARE

## 2020-06-11 VITALS
HEART RATE: 88 BPM | HEIGHT: 66 IN | DIASTOLIC BLOOD PRESSURE: 89 MMHG | RESPIRATION RATE: 18 BRPM | SYSTOLIC BLOOD PRESSURE: 137 MMHG | BODY MASS INDEX: 31.18 KG/M2 | OXYGEN SATURATION: 98 % | TEMPERATURE: 98 F | WEIGHT: 194 LBS

## 2020-06-11 DIAGNOSIS — Z13.6 ENCOUNTER FOR LIPID SCREENING FOR CARDIOVASCULAR DISEASE: ICD-10-CM

## 2020-06-11 DIAGNOSIS — Z00.00 ANNUAL PHYSICAL EXAM: Primary | ICD-10-CM

## 2020-06-11 DIAGNOSIS — K21.9 GASTROESOPHAGEAL REFLUX DISEASE, ESOPHAGITIS PRESENCE NOT SPECIFIED: ICD-10-CM

## 2020-06-11 DIAGNOSIS — Z13.220 ENCOUNTER FOR LIPID SCREENING FOR CARDIOVASCULAR DISEASE: ICD-10-CM

## 2020-06-11 DIAGNOSIS — L82.0 INFLAMED SEBORRHEIC KERATOSIS: ICD-10-CM

## 2020-06-11 DIAGNOSIS — Z78.0 ASYMPTOMATIC MENOPAUSAL STATE: ICD-10-CM

## 2020-06-11 DIAGNOSIS — Z23 NEED FOR TETANUS BOOSTER: ICD-10-CM

## 2020-06-11 DIAGNOSIS — Z11.59 NEED FOR HEPATITIS C SCREENING TEST: ICD-10-CM

## 2020-06-11 PROCEDURE — 99397 PER PM REEVAL EST PAT 65+ YR: CPT | Mod: 25,S$PBB,, | Performed by: FAMILY MEDICINE

## 2020-06-11 PROCEDURE — 99214 OFFICE O/P EST MOD 30 MIN: CPT | Mod: PBBFAC,PN,25 | Performed by: FAMILY MEDICINE

## 2020-06-11 PROCEDURE — 90714 TD VACC NO PRESV 7 YRS+ IM: CPT | Mod: PBBFAC,PN

## 2020-06-11 PROCEDURE — 99999 PR PBB SHADOW E&M-EST. PATIENT-LVL IV: ICD-10-PCS | Mod: PBBFAC,,, | Performed by: FAMILY MEDICINE

## 2020-06-11 PROCEDURE — 99397 PR PREVENTIVE VISIT,EST,65 & OVER: ICD-10-PCS | Mod: 25,S$PBB,, | Performed by: FAMILY MEDICINE

## 2020-06-11 PROCEDURE — 99999 PR PBB SHADOW E&M-EST. PATIENT-LVL IV: CPT | Mod: PBBFAC,,, | Performed by: FAMILY MEDICINE

## 2020-06-11 RX ORDER — OMEPRAZOLE 40 MG/1
40 CAPSULE, DELAYED RELEASE ORAL DAILY
Qty: 90 CAPSULE | Refills: 3 | Status: SHIPPED | OUTPATIENT
Start: 2020-06-11 | End: 2021-12-30

## 2020-06-11 NOTE — PROGRESS NOTES
Subjective:       Patient ID: Zuly Person is a 69 y.o. female.    Chief Complaint: Annual Exam    HPI   Ms. Person presents for annual exam. Due for tetanus booster, screening lipid panel, hep c screening test, and dxa scan. Gets cbc and cmp every 6 months with heme/onc due to h/o breast cancer.     Complains of an irritating skin lesion on the back of her right leg, present several months, no significant change since discovered.     Requests refill of omeprazole.    Review of Systems   Constitutional: Negative for chills, diaphoresis, fever and unexpected weight change.   Respiratory: Negative for cough, choking, chest tightness and shortness of breath.    Cardiovascular: Negative for chest pain, palpitations and leg swelling.   Neurological: Negative for tremors, seizures, syncope and speech difficulty.   Psychiatric/Behavioral: Negative for agitation, confusion, hallucinations, self-injury and suicidal ideas.       Past Medical History:   Diagnosis Date    Anxiety     Cancer     breast cancer, dx      Past Surgical History:   Procedure Laterality Date    BREAST SURGERY      implants    CHOLECYSTECTOMY      HYSTERECTOMY      MASTECTOMY       Social History     Socioeconomic History    Marital status:      Spouse name: Not on file    Number of children: Not on file    Years of education: Not on file    Highest education level: Not on file   Occupational History    Not on file   Social Needs    Financial resource strain: Not on file    Food insecurity:     Worry: Not on file     Inability: Not on file    Transportation needs:     Medical: Not on file     Non-medical: Not on file   Tobacco Use    Smoking status: Former Smoker     Packs/day: 1.00     Years: 15.00     Pack years: 15.00     Types: Cigarettes     Last attempt to quit:      Years since quittin.4    Smokeless tobacco: Never Used   Substance and Sexual Activity    Alcohol use: Yes     Frequency: 4 or more times a week      "Drinks per session: 1 or 2     Binge frequency: Never    Drug use: No    Sexual activity: Never   Lifestyle    Physical activity:     Days per week: Not on file     Minutes per session: Not on file    Stress: Not on file   Relationships    Social connections:     Talks on phone: Not on file     Gets together: Not on file     Attends Orthodox service: Not on file     Active member of club or organization: Not on file     Attends meetings of clubs or organizations: Not on file     Relationship status: Not on file   Other Topics Concern    Not on file   Social History Narrative    Not on file     History reviewed. No pertinent family history.    Objective:      /89 (BP Location: Left arm, Patient Position: Sitting, BP Method: Medium (Automatic))   Pulse 88   Temp 97.8 °F (36.6 °C) (Oral)   Resp 18   Ht 5' 6" (1.676 m)   Wt 88 kg (194 lb)   LMP  (LMP Unknown)   SpO2 98%   BMI 31.31 kg/m²   Physical Exam   Constitutional: She is oriented to person, place, and time. She appears well-developed and well-nourished. No distress.   HENT:   Head: Normocephalic and atraumatic.   Eyes: Conjunctivae are normal. Right eye exhibits no discharge. Left eye exhibits no discharge. No scleral icterus.   Cardiovascular: Normal rate, regular rhythm and normal heart sounds.   No murmur heard.  Neurological: She is alert and oriented to person, place, and time.   Skin: Skin is warm and dry. No rash noted. She is not diaphoretic.   Raised, flesh covered but mildly erythematous, cauliflower-like lesion on posterior right thigh   Psychiatric: She has a normal mood and affect. Her behavior is normal. Judgment and thought content normal.   Vitals reviewed.      Assessment:       1. Annual physical exam    2. Asymptomatic menopausal state    3. Encounter for lipid screening for cardiovascular disease    4. Need for hepatitis C screening test    5. Gastroesophageal reflux disease, esophagitis presence not specified    6. " Inflamed seborrheic keratosis    7. Need for tetanus booster        Plan:       Annual physical exam    Asymptomatic menopausal state  -     DXA Bone Density Spine And Hip; Future; Expected date: 11/28/2020    Encounter for lipid screening for cardiovascular disease  -     Lipid Panel; Future; Expected date: 11/28/2020    Need for hepatitis C screening test  -     Hepatitis C Antibody; Future; Expected date: 06/11/2020    Gastroesophageal reflux disease, esophagitis presence not specified  -     omeprazole (PRILOSEC) 40 MG capsule; Take 1 capsule (40 mg total) by mouth once daily.  Dispense: 90 capsule; Refill: 3    Inflamed seborrheic keratosis  -     Ambulatory referral/consult to Dermatology; Future; Expected date: 06/18/2020    Need for tetanus booster  -     (In Office Administered) Td Vaccine            Risks, benefits, and side effects were discussed with the patient. All questions were answered to the fullest satisfaction of the patient, and pt verbalized understanding and agreement to treatment plan. Pt was to call with any new or worsening symptoms, or present to the ER.

## 2020-06-16 ENCOUNTER — PATIENT OUTREACH (OUTPATIENT)
Dept: ADMINISTRATIVE | Facility: OTHER | Age: 70
End: 2020-06-16

## 2020-06-17 ENCOUNTER — OFFICE VISIT (OUTPATIENT)
Dept: DERMATOLOGY | Facility: CLINIC | Age: 70
End: 2020-06-17
Payer: MEDICARE

## 2020-06-17 DIAGNOSIS — L73.8 SEBACEOUS HYPERPLASIA: ICD-10-CM

## 2020-06-17 DIAGNOSIS — L82.0 INFLAMED SEBORRHEIC KERATOSIS: ICD-10-CM

## 2020-06-17 DIAGNOSIS — Z85.828 PERSONAL HISTORY OF OTHER MALIGNANT NEOPLASM OF SKIN: Primary | ICD-10-CM

## 2020-06-17 DIAGNOSIS — L57.8 SUN-DAMAGED SKIN: ICD-10-CM

## 2020-06-17 DIAGNOSIS — L72.0 MILIA: ICD-10-CM

## 2020-06-17 DIAGNOSIS — D18.01 CHERRY ANGIOMA: ICD-10-CM

## 2020-06-17 DIAGNOSIS — D48.5 NEOPLASM OF UNCERTAIN BEHAVIOR OF SKIN: ICD-10-CM

## 2020-06-17 PROCEDURE — 17110 DESTRUCTION B9 LES UP TO 14: CPT | Mod: 59,PBBFAC,PO | Performed by: DERMATOLOGY

## 2020-06-17 PROCEDURE — 99203 PR OFFICE/OUTPT VISIT, NEW, LEVL III, 30-44 MIN: ICD-10-PCS | Mod: 25,S$PBB,, | Performed by: DERMATOLOGY

## 2020-06-17 PROCEDURE — 11102 PR TANGENTIAL BIOPSY, SKIN, SINGLE LESION: ICD-10-PCS | Mod: S$PBB,,, | Performed by: DERMATOLOGY

## 2020-06-17 PROCEDURE — 88305 TISSUE EXAM BY PATHOLOGIST: ICD-10-PCS | Mod: 26,,, | Performed by: DERMATOLOGY

## 2020-06-17 PROCEDURE — 17110 PR DESTRUCTION BENIGN LESIONS UP TO 14: ICD-10-PCS | Mod: 59,S$PBB,, | Performed by: DERMATOLOGY

## 2020-06-17 PROCEDURE — 88305 TISSUE EXAM BY PATHOLOGIST: CPT | Mod: 26,,, | Performed by: DERMATOLOGY

## 2020-06-17 PROCEDURE — 88305 TISSUE EXAM BY PATHOLOGIST: CPT | Performed by: DERMATOLOGY

## 2020-06-17 PROCEDURE — 99213 OFFICE O/P EST LOW 20 MIN: CPT | Mod: PBBFAC,PO | Performed by: DERMATOLOGY

## 2020-06-17 PROCEDURE — 11102 TANGNTL BX SKIN SINGLE LES: CPT | Mod: PBBFAC,PO | Performed by: DERMATOLOGY

## 2020-06-17 PROCEDURE — 99999 PR PBB SHADOW E&M-EST. PATIENT-LVL III: CPT | Mod: PBBFAC,,, | Performed by: DERMATOLOGY

## 2020-06-17 PROCEDURE — 11102 TANGNTL BX SKIN SINGLE LES: CPT | Mod: S$PBB,,, | Performed by: DERMATOLOGY

## 2020-06-17 PROCEDURE — 17110 DESTRUCTION B9 LES UP TO 14: CPT | Mod: 59,S$PBB,, | Performed by: DERMATOLOGY

## 2020-06-17 PROCEDURE — 99999 PR PBB SHADOW E&M-EST. PATIENT-LVL III: ICD-10-PCS | Mod: PBBFAC,,, | Performed by: DERMATOLOGY

## 2020-06-17 PROCEDURE — 99203 OFFICE O/P NEW LOW 30 MIN: CPT | Mod: 25,S$PBB,, | Performed by: DERMATOLOGY

## 2020-06-17 NOTE — LETTER
June 17, 2020      Lucrecia De La Cruz DO  [address] [facility] [place] [address]           [place] [address]  Phone: [phone]          Patient: Zuly Person   MR Number: [id number]   YOB: 1950   Date of Visit: 6/17/2020       Dear Dr. Lucrecia De La Cruz:    Thank you for referring Zuyl Person to me for evaluation. Attached you will find relevant portions of my assessment and plan of care.    If you have questions, please do not hesitate to call me. I look forward to following Zuly Person along with you.    Sincerely,    Margi Toro MD    Enclosure  CC:  No Recipients    If you would like to receive this communication electronically, please contact [email or web address] or [phone] to request more information on [employer] access.    For providers and/or their staff who would like to refer a patient to [facility], please contact us through our one-stop-shop provider referral line, Cliff Anthony, at [phone].    If you feel you have received this communication in error or would no longer like to receive these types of communications, please e-mail [email or web address]

## 2020-06-17 NOTE — PROGRESS NOTES
Subjective:       Patient ID:  Zuly Person is a 69 y.o. female who presents for   Chief Complaint   Patient presents with    Spot     New Patient     69 y.o. female who presents for a lesion on her L lower leg. Present for about 3 or 4 months. The spot does itch and bothers her.   Has a few other spots that she would possibly like removed. Not bothersome she would just like them removed.     Would also like to have a few moles on her back checked to make sure they are okay.     HX of Randy K's (Had removed in [place])     Derm HX: BCC - Nose   Dad - BCC   Daughter - MM - back     History breast cancer     Review of Systems   Constitutional: Negative for fever, chills and fatigue.   Respiratory: Negative for cough and shortness of breath.    Skin: Positive for activity-related sunscreen use. Negative for itching, rash, dry skin, sun sensitivity and daily sunscreen use.   Hematologic/Lymphatic: Does not bruise/bleed easily.      Past Medical History:   Diagnosis Date    Anxiety     Basal cell carcinoma     Cancer     breast cancer, dx 2015     Objective:    Physical Exam   Constitutional: She appears well-developed and well-nourished. No distress.   HENT:   Head:       Mouth/Throat: Lips normal.    Eyes: Lids are normal.    Cardiovascular: There is no local extremity swelling and no dependent edema.     Neurological: She is alert and oriented to person, place, and time. She is not disoriented.   Psychiatric: She has a normal mood and affect. She is not agitated.   Skin:   Areas Examined (abnormalities noted in diagram):   Scalp / Hair Palpated and Inspected  Head / Face Inspection Performed  Neck Inspection Performed  Chest / Axilla Inspection Performed  Abdomen Inspection Performed  Back Inspection Performed  RUE Inspected  LUE Inspection Performed  RLE Inspected  LLE Inspection Performed  Nails and Digits Inspection Performed                   Diagram Legend     Erythematous scaling macule/papule c/w actinic  keratosis       Vascular papule c/w angioma      Pigmented verrucoid papule/plaque c/w seborrheic keratosis      Yellow umbilicated papule c/w sebaceous hyperplasia      Irregularly shaped tan macule c/w lentigo     1-2 mm smooth white papules consistent with Milia      Movable subcutaneous cyst with punctum c/w epidermal inclusion cyst      Subcutaneous movable cyst c/w pilar cyst      Firm pink to brown papule c/w dermatofibroma      Pedunculated fleshy papule(s) c/w skin tag(s)      Evenly pigmented macule c/w junctional nevus     Mildly variegated pigmented, slightly irregular-bordered macule c/w mildly atypical nevus      Flesh colored to evenly pigmented papule c/w intradermal nevus       Pink pearly papule/plaque c/w basal cell carcinoma      Erythematous hyperkeratotic cursted plaque c/w SCC      Surgical scar with no sign of skin cancer recurrence      Open and closed comedones      Inflammatory papules and pustules      Verrucoid papule consistent consistent with wart     Erythematous eczematous patches and plaques     Dystrophic onycholytic nail with subungual debris c/w onychomycosis     Umbilicated papule    Erythematous-base heme-crusted tan verrucoid plaque consistent with inflamed seborrheic keratosis     Erythematous Silvery Scaling Plaque c/w Psoriasis     See annotation          Assessment / Plan:      Pathology Orders:     Normal Orders This Visit    Specimen to Pathology, Dermatology     Comments:    Number of Specimens:->1  ------------------------->-------------------------  Spec 1 Procedure:->Biopsy  Spec 1 Clinical Impression:->seb hyperplasia r/o bcc  Spec 1 Source:->right cheek    Questions:    Procedure Type: Dermatology and skin neoplasms    Number of Specimens: 1    ------------------------: -------------------------    Spec 1 Procedure: Biopsy    Spec 1 Clinical Impression: seb hyperplasia r/o bcc    Spec 1 Source: right cheek    Clinical Information: pearly yellow papule         Personal history of other malignant neoplasm of skin  Area(s) of previous NMSC evaluated with no signs of recurrence.    Upper body skin examination performed today including at least 6 points as noted in physical examination. Suspicious lesions noted.      Inflamed seborrheic keratosis  -     Ambulatory referral/consult to Dermatology  Cryosurgery procedure note:    Verbal consent from the patient is obtained. Liquid nitrogen cryosurgery is applied to 1 lesions to produce a freeze injury. The patient is aware that blisters may form and is instructed on wound care with gentle cleansing and use of vaseline ointment to keep moist until healed. The patient is supplied a handout on cryosurgery and is instructed to call if lesions do not completely resolve. Risk of dyspigmentation discussed.     Neoplasm of uncertain behavior of skin  -     Specimen to Pathology, Dermatology  Shave biopsy procedure note:    Shave biopsy performed after verbal consent including risk of infection, scar, recurrence, need for additional treatment of site. Area prepped with alcohol, anesthetized with approximately 1.0cc of 1% lidocaine with epinephrine. Lesional tissue shaved with razor blade. Hemostasis achieved with application of aluminum chloride followed by hyfrecation. No complications. Dressing applied. Wound care explained.        Sebaceous hyperplasia, face  Reassurance given to patient. No treatment is necessary.   Treatment of benign, asymptomatic lesions may be considered cosmetic.      Milia  Lesion incised with #11 blade and drained on today's date      Sun-damaged skin  Area(s) of previous NMSC evaluated with no signs of recurrence.    Upper body skin examination performed today including at least 6 points as noted in physical examination. Suspicious lesions noted.  Discussed with patient the importance of sun precautions, including broad spectrum sunscreen use with minimum SPF 30, wearing sun protective clothing and  wide-brim hat as well as sun avoidance during peak hours between 10 am and 4 pm.       Cherry angioma, legs  This is a benign vascular lesion. Reassurance given. No treatment required.            Follow up in about 6 months (around 12/17/2020).

## 2020-06-23 ENCOUNTER — TELEPHONE (OUTPATIENT)
Dept: DERMATOLOGY | Facility: CLINIC | Age: 70
End: 2020-06-23

## 2020-06-23 LAB
FINAL PATHOLOGIC DIAGNOSIS: NORMAL
GROSS: NORMAL
MICROSCOPIC EXAM: NORMAL

## 2020-06-23 NOTE — TELEPHONE ENCOUNTER
"----- Message from Margi Toro MD sent at 6/23/2020 10:16 AM CDT -----  Please call pt with results and schedule consultation with Dr. Edouard for Mohs surgery on [place]. Thank you.       Final Pathologic Diagnosis Skin, right cheek, biopsy:   -BASAL CELL CARCINOMA, NODULAR TYPE, EXTENDING TO DEEP AND PERIPHERAL MARGINS   Comment: Interpreted by: Payton May M.D., Signed on 06/23/2020 at 09:47  Gross [employer] Label: Clinic Number/AP Number:  [id number], and "right cheek "   Received in formalin is a 3 x 2 x 1 mm shave biopsy fragment of tan-white   skin.  Specimen is inked and entirely submitted in 1947.   Miles Dash   Microscopic Exam Sections show skin with nests of atypical basaloid keratinocytes with   mitoses, peripheral palisading, and cleft formation in dermis.  Several   deeper levels were examined.   Resulting Agency [facility]      "

## 2020-06-23 NOTE — TELEPHONE ENCOUNTER
----- Message from Ely Hernandez sent at 6/23/2020 12:04 PM CDT -----  Regarding: Return Call  Contact: Patient  Type:  Patient Returning Call    Who Called: Zuly  Who Left Message for Patient: Fransisca  Does the patient know what this is regarding?: not sure   Would the patient rather a call back or a response via [facility]? Call back   Best Call Back Number:[phone]  Additional Information: pt missed call and would like a return call

## 2020-06-23 NOTE — TELEPHONE ENCOUNTER
"----- Message from Margi Toro MD sent at 6/23/2020 10:16 AM CDT -----  Please call pt with results and schedule consultation with Dr. Edouard for Mohs surgery on [place]. Thank you.       Final Pathologic Diagnosis Skin, right cheek, biopsy:   -BASAL CELL CARCINOMA, NODULAR TYPE, EXTENDING TO DEEP AND PERIPHERAL MARGINS   Comment: Interpreted by: Payton May M.D., Signed on 06/23/2020 at 09:47  Gross Container Label: Clinic Number/AP Number:  [id number], and "right cheek "   Received in formalin is a 3 x 2 x 1 mm shave biopsy fragment of tan-white   skin.  Specimen is inked and entirely submitted in 1947.   Miles Dash   Microscopic Exam Sections show skin with nests of atypical basaloid keratinocytes with   mitoses, peripheral palisading, and cleft formation in dermis.  Several   deeper levels were examined.   Resulting Agency [facility]      "

## 2020-06-24 ENCOUNTER — LAB VISIT (OUTPATIENT)
Dept: LAB | Facility: HOSPITAL | Age: 70
End: 2020-06-24
Attending: FAMILY MEDICINE
Payer: MEDICARE

## 2020-06-24 DIAGNOSIS — Z13.220 ENCOUNTER FOR LIPID SCREENING FOR CARDIOVASCULAR DISEASE: ICD-10-CM

## 2020-06-24 DIAGNOSIS — Z13.6 ENCOUNTER FOR LIPID SCREENING FOR CARDIOVASCULAR DISEASE: ICD-10-CM

## 2020-06-24 DIAGNOSIS — Z11.59 NEED FOR HEPATITIS C SCREENING TEST: ICD-10-CM

## 2020-06-24 LAB
CHOLEST SERPL-MCNC: 179 MG/DL (ref 120–199)
CHOLEST/HDLC SERPL: 2.4 {RATIO} (ref 2–5)
HDLC SERPL-MCNC: 74 MG/DL (ref 40–75)
HDLC SERPL: 41.3 % (ref 20–50)
LDLC SERPL CALC-MCNC: 94.2 MG/DL (ref 63–159)
NONHDLC SERPL-MCNC: 105 MG/DL
TRIGL SERPL-MCNC: 54 MG/DL (ref 30–150)

## 2020-06-24 PROCEDURE — 86803 HEPATITIS C AB TEST: CPT

## 2020-06-24 PROCEDURE — 80061 LIPID PANEL: CPT

## 2020-06-24 PROCEDURE — 36415 COLL VENOUS BLD VENIPUNCTURE: CPT

## 2020-06-25 LAB — HCV AB SERPL QL IA: NEGATIVE

## 2020-07-07 ENCOUNTER — PATIENT OUTREACH (OUTPATIENT)
Dept: ADMINISTRATIVE | Facility: OTHER | Age: 70
End: 2020-07-07

## 2020-07-07 NOTE — PROGRESS NOTES
Requested updates within Care Everywhere.  Patient's chart was reviewed for overdue PRATEEK topics.  Immunizations reconciled.

## 2020-07-09 ENCOUNTER — INITIAL CONSULT (OUTPATIENT)
Dept: DERMATOLOGY | Facility: CLINIC | Age: 70
End: 2020-07-09
Payer: MEDICARE

## 2020-07-09 VITALS
HEART RATE: 85 BPM | BODY MASS INDEX: 30.86 KG/M2 | HEIGHT: 66 IN | WEIGHT: 192 LBS | DIASTOLIC BLOOD PRESSURE: 79 MMHG | SYSTOLIC BLOOD PRESSURE: 138 MMHG

## 2020-07-09 DIAGNOSIS — C44.111 BASAL CELL CARCINOMA, EYELID, RIGHT: Primary | ICD-10-CM

## 2020-07-09 PROCEDURE — 99214 OFFICE O/P EST MOD 30 MIN: CPT | Mod: S$PBB,,, | Performed by: DERMATOLOGY

## 2020-07-09 PROCEDURE — 99214 PR OFFICE/OUTPT VISIT, EST, LEVL IV, 30-39 MIN: ICD-10-PCS | Mod: S$PBB,,, | Performed by: DERMATOLOGY

## 2020-07-09 PROCEDURE — 99999 PR PBB SHADOW E&M-EST. PATIENT-LVL IV: ICD-10-PCS | Mod: PBBFAC,,, | Performed by: DERMATOLOGY

## 2020-07-09 PROCEDURE — 99214 OFFICE O/P EST MOD 30 MIN: CPT | Mod: PBBFAC,PO | Performed by: DERMATOLOGY

## 2020-07-09 PROCEDURE — 99999 PR PBB SHADOW E&M-EST. PATIENT-LVL IV: CPT | Mod: PBBFAC,,, | Performed by: DERMATOLOGY

## 2020-07-09 NOTE — PROGRESS NOTES
ALLERGIES:  Patient has no known allergies.    The patient is accompanied to this visit by her .    CHIEF COMPLAINT:  This 69 y.o. female comes for evaluation for Mohs' Micrographic Surgery, Fresh Tissue Technique, for treatment of a biopsy-proven basal cell carcinoma on the right lower eyelid/cheek. Consultation requested by Margi Toro M.D..    HISTORY OF PRESENT ILLNESS:   Location: Right cheek  Duration: unsure  Quality: persistent  Context: status post biopsy by Margi Toro M.D; path = basal cell carcinoma; pathology accession # [id number]  [facility]    Prior Treatment: none  See also the handwritten notes/diagrams scanned to chart for additional details.    Defibrillator: No  Pacemaker: No  Artificial heart valves: No  Artificial joints: No    REVIEW OF SYSTEMS:   General: general health good  Skin: previous skin cancer(s) Yes. BCC nose 1990  Relevant other:  No   Cardiovascular:   High Blood Pressure: No   Chest Pain:  No   Defibrillator: as above  Pacemaker: as above  Artificial heart valves: as above  Prior Endocarditis: No   Prior Heart Attack/MI: No     If yes, when:   Prior Cardiac Bypass or Stents:  No   If yes, when:   Mitral Valve Prolapse: No   Relevant other:  No   Respiratory:   Shortness of breath:  No   Relevant other:  No   Endocrine:   Diabetes:  No   Relevant other:  No   Hem/Lymph:   Taking Prescribed Blood Thinners:  No   Easy Bleeding:  No   Relevant other:  No   Allergy/Immuno: as noted above  Relevant other:  No   GI:   Prior Hepatitis:  No     If yes, details:   Relevant other:  No   Musculoskeletal:   Artificial joints: as above  Relevant other:  No   Neurologic:   Prior Stroke:    Relevant other:  No   Relevant other info:  Breast cancer 2015       PAST MEDICAL HISTORY:  Past Medical History:   Diagnosis Date    Anxiety     Basal cell carcinoma     Cancer     breast cancer, dx 2015       PAST SURGICAL HISTORY:  Past Surgical History:   Procedure  Laterality Date    BREAST SURGERY      implants    CHOLECYSTECTOMY      HYSTERECTOMY      MASTECTOMY          SOCIAL HISTORY:  Dependencies: smoking status as noted below  Social History     Tobacco Use    Smoking status: Former Smoker     Packs/day: 1.00     Years: 15.00     Pack years: 15.00     Types: Cigarettes     Quit date:      Years since quittin.5    Smokeless tobacco: Never Used   Substance Use Topics    Alcohol use: Yes     Frequency: 4 or more times a week     Drinks per session: 1 or 2     Binge frequency: Never    Drug use: No       PERTINENT MEDICATIONS:  See medications list.    Current Outpatient Medications:     albuterol (PROVENTIL/VENTOLIN HFA) 90 mcg/actuation inhaler, Inhale 2 puffs into the lungs every 6 (six) hours as needed for Wheezing. Rescue, Disp: 3 Inhaler, Rfl: 0    aspirin (ECOTRIN) 81 MG EC tablet, Take 81 mg by mouth once daily., Disp: , Rfl:     atorvastatin (LIPITOR) 20 MG tablet, Take 1 tablet (20 mg total) by mouth once daily., Disp: 90 tablet, Rfl: 3    budesonide-formoterol 160-4.5 mcg (SYMBICORT) 160-4.5 mcg/actuation HFAA, Inhale 2 puffs into the lungs every 12 (twelve) hours. Controller, Disp: 1 Inhaler, Rfl: 11    diclofenac (VOLTAREN) 75 MG EC tablet, TAKE 1 TABLET BY MOUTH TWICE DAILY, Disp: 180 tablet, Rfl: 1    FLUoxetine 40 MG capsule, Take 1 capsule (40 mg total) by mouth once daily., Disp: 90 capsule, Rfl: 3    fluticasone propionate (FLONASE) 50 mcg/actuation nasal spray, 2 sprays (100 mcg total) by Each Nostril route once daily., Disp: 16 g, Rfl: 11    montelukast (SINGULAIR) 10 mg tablet, Take 1 tablet (10 mg total) by mouth every evening., Disp: 90 tablet, Rfl: 3    omeprazole (PRILOSEC) 40 MG capsule, Take 1 capsule (40 mg total) by mouth once daily., Disp: 90 capsule, Rfl: 3    VIRTUSSIN AC  mg/5 mL syrup, TK 10 ML PO TID PRN., Disp: , Rfl: 0    ALLERGIES:  Patient has no known allergies.    EXAM:  See also the handwritten  notes/diagrams scanned to chart for additional details.  Constitutional  General appearance: well-developed, well-nourished, well-kempt older white female    Eyes  Inspection of conjunctivae and lids reveals no abnormalities other than as noted below; sclerae anicteric  Neurologic/Psychiatric  Alert,  normal orientation to time, place, person  Normal mood and affect with no evidence of depression, anxiety, agitation  Skin: see photo(s)  Head: background moderate solar damage to exposed areas of skin  On the right lower eyelid at the junction with the cheek there is an approx 4 mm pink biopsy site  site(s) confirmed by reference to the photograph(s) attached below taken at the time of the biopsy/biopsies by the referring physician and she confirmed this as the site of the biopsy   Neck: examination reveals moderate chronic solar damage  Right upper extremity: examination reveals moderate chronic solar damage   Left upper extremity: examination reveals moderate chronic solar damage    Photo(s) this visit:       Photo(s) from biopsy visit:      ASSESSMENT: biopsy-proven basal cell carcinoma of the right lower eyelid  chronic solar damage to areas as noted above    PLAN:  The diagnosis and management options, and risks and benefits of the alternatives, including observation/non-treatment, radiation treatment, excision with vertical frozen section or paraffin-embedded section margin evaluation, and Mohs' Micrographic Surgery, Fresh Tissue Technique, were discussed at length with the patient and her . In particular, the discussion included, but was not limited to, the following:    One alternative at this point would be to defer further treatment and observe the lesion. With small skin cancers of this kind, it is possible that a biopsy can be sufficient to definitively treat a small skin cancer of this kind. Alternatively, some skin cancers are slow growing and do not require immediate treatment. The potential  advantage of this choice would be to avoid the need for possibly unnecessary additional surgery. Among the potential disadvantages of this would be the possibility of enlargement of the lesion, more extensive spread of the lesion or recurrence at a later date, which might necessitate a larger and more complex surgery.    Radiation treatment can be an effective treatment for this type of skin cancer. The usual course of treatment is every weekday for several weeks. Local irritation will result from treatment, although no systemic side effects are expected. The potential advantage of radiation treatment is that it avoids the need for surgery. Among the disadvantages of radiation treatment are the length of treatment, the local inflammatory response, the absence of pathologic confirmation of the removal of the skin cancer, a possible increased risk of additional skin cancer in the treated area in later years, and a somewhat increased risk of recurrence at a later date.     Excisional surgery can be an effective treatment for this type of skin cancer. This would involve excision of the lesion with margin evaluation by submitting the specimen to a pathologist for either immediate marginal assessment via frozen section processing, or delayed marginal assessment by fixed-tissue processing. The potential advantage of this technique is that it offers a way of treating the lesion with some degree of histologic confirmation of tumor removal. Among the disadvantages of this treatment are the possible need for re-excision if marginal involvement is identified, a somewhat greater likelihood of recurrence as compared to Mohs' surgery because of the less comprehensive margin evaluation inherent in the technique, and the general potential risks of surgery, including allergic reactions to the anesthetic and other materials used, infection, injury to nerves in the area with consequent loss of sensation or muscle function, and scarring  or distortion of surrounding structures.    Mohs' surgery is a very effective treatment for this type of skin cancer. The potential advantage of Mohs' surgery is that this technique offers the greatest possible certainty of knowing that the skin cancer has been completely removed, with the removal of the least amount of normal tissue. The potential disadvantages of Mohs' surgery include the duration of the surgery, the possible need for a separate surgery for reconstruction following tumor removal, and scarring as a result. In addition, general potential risks of surgery as noted above also apply to treatment via Mohs' surgery.    In light of the nature of this tumor and the location on the eyelid in an area of increased risk of recurrence,  Mohs' micrographic surgery was thought to be the most appropriate management choice, and this diagnosis is appropriate for treatment by Mohs' micrographic surgery.     We also discussed options for repair of the site to follow tumor removal via Mohs' surgery, including the participation of a reconstructive surgeon to reconstruct the resultant wound. Given the location, the anticipated size and potential complexity of the defect to follow tumor removal via Mohs' surgery, reconstruction will be coordinated with Kamilah Miranda MD.  I will contact Dr. Miranda to arrange for the patient to be seen in consultation, and we will coordinate the surgeries thereafter.    Sufficient time was available for questions, and all questions were answered to her satisfaction. She fully understands the aims, risks, alternatives, and possible complications, and has elected to proceed with the surgery, and verbally consented to do so. The procedure will be scheduled in the near future.    Routine pre-op instructions were given to her.    --------------------------------------  Note: Some or all of this note may have been generated using voice recognition software. There may be voice recognition errors  including grammatical and/or spelling errors found in the text. Attempts were made to correct these errors prior to signature.

## 2020-07-09 NOTE — LETTER
July 10, 2020      Margi Toro MD  [address] [place] [address] [address] [place] [address]           [facility] [address] [place] [address]  Phone: [phone]          Patient: Zuly Person   MR Number: [id number]   YOB: 1950   Date of Visit: 7/9/2020       Dear Dr. Margi Toro:    Thank you for referring Zuly Person to me for evaluation. Attached you will find relevant portions of my assessment and plan of care.    If you have questions, please do not hesitate to call me. I look forward to following Zuly Person along with you.    Sincerely,    Álvaro Edouard MD    Enclosure  CC:  No Recipients    If you would like to receive this communication electronically, please contact [email or web address] or [phone] to request more information on [employer] Link access.    For providers and/or their staff who would like to refer a patient to [facility], please contact us through our one-stop-shop provider referral line, [facility], at [phone].    If you feel you have received this communication in error or would no longer like to receive these types of communications, please e-mail [email or web address]

## 2020-07-10 ENCOUNTER — TELEPHONE (OUTPATIENT)
Dept: OPHTHALMOLOGY | Facility: CLINIC | Age: 70
End: 2020-07-10

## 2020-07-10 NOTE — TELEPHONE ENCOUNTER
----- Message from Kamilah Miranda MD sent at 7/10/2020 10:47 AM CDT -----  Thank you Álvaro. The staff will get her scheduled.     Kamilah   ----- Message -----  From: Álvaro Edouard MD  Sent: 7/10/2020   9:06 AM CDT  To: Magy Felton, CST, Kamilah Miranda MD, #    Kamilah,   I would like to have you see this patient, Zuly Person, in consultation regarding repair to follow Mohs' surgery for a basal cell carcinoma on her right lower eyelid.   Ms Person is a 69 y.o. lady with a relatively medical history notable for TIA in 2015, and ongoing aspirin use.  Attached is a photo of the site which Margi Toro took at the time of the biopsy.  I will also copy this message to your staff so that they can contact her to schedule an appointment with you preoperatively. We can coordinate her surgeries after you have seen her.   Please let me know if you have any questions.  Many thanks,   Álvaro

## 2020-07-13 ENCOUNTER — HOSPITAL ENCOUNTER (OUTPATIENT)
Dept: RADIOLOGY | Facility: HOSPITAL | Age: 70
Discharge: HOME | End: 2020-07-13
Attending: FAMILY MEDICINE
Payer: MEDICARE

## 2020-07-13 DIAGNOSIS — Z78.0 ASYMPTOMATIC MENOPAUSAL STATE: ICD-10-CM

## 2020-07-13 PROCEDURE — 77080 DXA BONE DENSITY AXIAL: CPT | Mod: TC

## 2020-07-13 PROCEDURE — 77080 DXA BONE DENSITY AXIAL: CPT | Mod: 26,,, | Performed by: RADIOLOGY

## 2020-07-13 PROCEDURE — 77080 DEXA BONE DENSITY SPINE HIP: ICD-10-PCS | Mod: 26,,, | Performed by: RADIOLOGY

## 2020-07-13 NOTE — PROGRESS NOTES
Please let patient know that she has mild bone thinning. Weight bearing exercises like walking can prevent the development of osteoporosis, as can supplementing with calcium (1200 mg daily), vitamin D (2000 IU daily), vitamin K2 (95 mgc daily), and magnesium (400 mg daily). We can repeat a dexa scan in about 2 years. Let me know if she has any questions. Thanks

## 2020-07-14 DIAGNOSIS — Z76.0 MEDICATION REFILL: ICD-10-CM

## 2020-07-14 DIAGNOSIS — J45.20 MILD INTERMITTENT ASTHMA WITHOUT COMPLICATION: ICD-10-CM

## 2020-07-14 DIAGNOSIS — R05.3 CHRONIC COUGH: ICD-10-CM

## 2020-07-14 RX ORDER — MONTELUKAST SODIUM 10 MG/1
10 TABLET ORAL NIGHTLY
Qty: 90 TABLET | Refills: 3 | Status: SHIPPED | OUTPATIENT
Start: 2020-07-14 | End: 2023-09-12 | Stop reason: SDUPTHER

## 2020-07-14 RX ORDER — FLUOXETINE HYDROCHLORIDE 40 MG/1
40 CAPSULE ORAL DAILY
Qty: 90 CAPSULE | Refills: 3 | Status: SHIPPED | OUTPATIENT
Start: 2020-07-14 | End: 2021-06-07

## 2020-07-14 RX ORDER — ATORVASTATIN CALCIUM 20 MG/1
20 TABLET, FILM COATED ORAL DAILY
Qty: 90 TABLET | Refills: 3 | Status: SHIPPED | OUTPATIENT
Start: 2020-07-14 | End: 2023-08-30

## 2020-07-14 NOTE — TELEPHONE ENCOUNTER
----- Message from Ingris Hernandez sent at 7/14/2020  2:41 PM CDT -----  Contact: self  Type:  RX Refill Request    Who Called:  patient   Refill or New Rx:  refill  RX Name and Strength:   atorvastatin (LIPITOR) 20 MG tablet  1 x day  FLUoxetine 40 MG capsule 1 x day  montelukast (SINGULAIR) 10 mg tablet  Is this a 30 day or 90 day RX:  90  Preferred Pharmacy with phone number:    [facility] - [place], [place] - [address] AT [address] [facility]  [address]  [facility] [address]  Phone: [phone] Fax: [phone]  Local or Mail Order:  mail order  Ordering Provider:  Lucrecia Abdullahi Call Back Number:  [phone] (home)     Additional Information:

## 2020-07-14 NOTE — PROGRESS NOTES
07/14/20  REFAXED REQUEST BELOW  05/28/20  FAX SENT TO DR SADIE CASTELLANOS FOR MOST RECENT COLONOSCOPY  (P) [phone]  (F) [phone]

## 2020-07-15 ENCOUNTER — PATIENT OUTREACH (OUTPATIENT)
Dept: ADMINISTRATIVE | Facility: HOSPITAL | Age: 70
End: 2020-07-15

## 2020-08-03 ENCOUNTER — PATIENT OUTREACH (OUTPATIENT)
Dept: ADMINISTRATIVE | Facility: OTHER | Age: 70
End: 2020-08-03

## 2020-08-03 NOTE — PROGRESS NOTES
Requested updates within Care Everywhere.  Patient's chart was reviewed for overdue PRATEEK topics.  Orders placed:n/a  Tasked appts:n/a  Labs Linked:n/a

## 2020-08-04 ENCOUNTER — OFFICE VISIT (OUTPATIENT)
Dept: OPHTHALMOLOGY | Facility: CLINIC | Age: 70
End: 2020-08-04
Payer: MEDICARE

## 2020-08-04 DIAGNOSIS — H25.13 NS (NUCLEAR SCLEROSIS), BILATERAL: ICD-10-CM

## 2020-08-04 DIAGNOSIS — C44.1122 BASAL CELL CARCINOMA (BCC) OF SKIN OF RIGHT LOWER EYELID INCLUDING CANTHUS: Primary | ICD-10-CM

## 2020-08-04 PROCEDURE — 92004 COMPRE OPH EXAM NEW PT 1/>: CPT | Mod: S$PBB,,, | Performed by: OPHTHALMOLOGY

## 2020-08-04 PROCEDURE — 99999 PR PBB SHADOW E&M-EST. PATIENT-LVL III: CPT | Mod: PBBFAC,,, | Performed by: OPHTHALMOLOGY

## 2020-08-04 PROCEDURE — 92285 EXTERNAL PHOTOGRAPHY - OU - BOTH EYES: ICD-10-PCS | Mod: 26,S$PBB,, | Performed by: OPHTHALMOLOGY

## 2020-08-04 PROCEDURE — 99213 OFFICE O/P EST LOW 20 MIN: CPT | Mod: PBBFAC | Performed by: OPHTHALMOLOGY

## 2020-08-04 PROCEDURE — 92285 EXTERNAL OCULAR PHOTOGRAPHY: CPT | Mod: PBBFAC | Performed by: OPHTHALMOLOGY

## 2020-08-04 PROCEDURE — 99999 PR PBB SHADOW E&M-EST. PATIENT-LVL III: ICD-10-PCS | Mod: PBBFAC,,, | Performed by: OPHTHALMOLOGY

## 2020-08-04 PROCEDURE — 92004 PR EYE EXAM, NEW PATIENT,COMPREHESV: ICD-10-PCS | Mod: S$PBB,,, | Performed by: OPHTHALMOLOGY

## 2020-08-04 NOTE — PROGRESS NOTES
HPI     Pt here for basal cell carcinoma lesion. Referred by: Dr. Margi Toro (dermatology).  How long has lesion been present? Patient states that she didn't notice   until Dr. Toro pointed it out on 7/9/2020.  Is lesion bothersome? Yes, painful to touch.  Does the lesion bleed? No.  Patient states that Dr. Toro bx lesions and it came back confirmed   BCC.    EYE MEDS:  NONE    MEDICAL / SURGICAL HX:  H/o TIA  Neuroma  Malignant neoplasm of female breast  H/o of malignant neoplasm of skin  Hypothyroidism   BCC    Last edited by Nathalia Rosas on 8/4/2020  4:13 PM. (History)            Assessment /Plan     For exam results, see Encounter Report.    Basal cell carcinoma (BCC) of skin of right lower eyelid including canthus  -     External Photography - OU - Both Eyes    NS (nuclear sclerosis), bilateral      Patient is a pleasant 70 yo  female referred here for basal cell carcinoma of right lower eyelid. Diagnosis confirmed on pathology.    Exam demonstrates biopsy site for basal cell carcinoma of right lower eyelid.    Patient will undergo Mohs surgery and return for reconstruction. Due to the unknown size of the potential defect, reconstructive options for eyelid and periorbital reconstruction including Bacon flaps, advancement and rotation flaps, skin grafts, direct closure, and silicone intubation of the lacrimal system were discussed. All questions were answered.    Informed consent obtained after extensive risks/benefits/alternatives were discussed with the patient including but not limited to pain, bleeding, infection, ocular injury, loss of the eye, asymmetry, need for revision in future, scarring.  Alternatives such as waiting were discussed.  All questions were answered.    Hold ASA, NSAIDS, and fish oil 5 to 7 days prior to procedure.    Return for surgery.    I have reviewed and concur with the resident's history, physical, assessment, and plan.  I have personally  interviewed and examined the patient.

## 2020-08-05 ENCOUNTER — TELEPHONE (OUTPATIENT)
Dept: DERMATOLOGY | Facility: CLINIC | Age: 70
End: 2020-08-05

## 2020-08-05 ENCOUNTER — TELEPHONE (OUTPATIENT)
Dept: OPHTHALMOLOGY | Facility: CLINIC | Age: 70
End: 2020-08-05

## 2020-08-05 DIAGNOSIS — Z03.818 ENCOUNTER FOR OBSERVATION FOR SUSPECTED EXPOSURE TO OTHER BIOLOGICAL AGENTS RULED OUT: ICD-10-CM

## 2020-08-05 DIAGNOSIS — Z13.9 SCREENING FOR UNSPECIFIED CONDITION: ICD-10-CM

## 2020-08-05 DIAGNOSIS — C44.91 BASAL CELL CARCINOMA (BCC), UNSPECIFIED SITE: Primary | ICD-10-CM

## 2020-08-10 ENCOUNTER — TELEPHONE (OUTPATIENT)
Dept: OPHTHALMOLOGY | Facility: CLINIC | Age: 70
End: 2020-08-10

## 2020-10-19 ENCOUNTER — TELEPHONE (OUTPATIENT)
Dept: FAMILY MEDICINE | Facility: CLINIC | Age: 70
End: 2020-10-19

## 2020-10-19 NOTE — TELEPHONE ENCOUNTER
----- Message from Ruthie Branham sent at 10/19/2020  9:58 AM CDT -----  Regarding: advice  Contact: self  Type: Needs Medical Advice  Who Called:  self  Symptoms (please be specific):    How long has patient had these symptoms:    Pharmacy name and phone #:    Best Call Back Number: [phone]  Additional Information: Patient states she need clearance for mole surgery. Patient is schedule for surgery with Dr Álvaro Edouard.

## 2020-10-21 ENCOUNTER — PATIENT OUTREACH (OUTPATIENT)
Dept: ADMINISTRATIVE | Facility: OTHER | Age: 70
End: 2020-10-21

## 2020-10-22 ENCOUNTER — OFFICE VISIT (OUTPATIENT)
Dept: FAMILY MEDICINE | Facility: CLINIC | Age: 70
End: 2020-10-22
Payer: MEDICARE

## 2020-10-22 VITALS
DIASTOLIC BLOOD PRESSURE: 82 MMHG | HEART RATE: 76 BPM | HEIGHT: 66 IN | TEMPERATURE: 97 F | OXYGEN SATURATION: 98 % | BODY MASS INDEX: 31.98 KG/M2 | WEIGHT: 199 LBS | SYSTOLIC BLOOD PRESSURE: 139 MMHG

## 2020-10-22 DIAGNOSIS — Z01.818 PRE-OP EVALUATION: Primary | ICD-10-CM

## 2020-10-22 DIAGNOSIS — H10.13 ALLERGIC CONJUNCTIVITIS OF BOTH EYES: ICD-10-CM

## 2020-10-22 PROCEDURE — 99213 PR OFFICE/OUTPT VISIT, EST, LEVL III, 20-29 MIN: ICD-10-PCS | Mod: S$PBB,,, | Performed by: FAMILY MEDICINE

## 2020-10-22 PROCEDURE — 99213 OFFICE O/P EST LOW 20 MIN: CPT | Mod: S$PBB,,, | Performed by: FAMILY MEDICINE

## 2020-10-22 PROCEDURE — 99999 PR PBB SHADOW E&M-EST. PATIENT-LVL III: CPT | Mod: PBBFAC,,, | Performed by: FAMILY MEDICINE

## 2020-10-22 PROCEDURE — 99999 PR PBB SHADOW E&M-EST. PATIENT-LVL III: ICD-10-PCS | Mod: PBBFAC,,, | Performed by: FAMILY MEDICINE

## 2020-10-22 PROCEDURE — 99213 OFFICE O/P EST LOW 20 MIN: CPT | Mod: PBBFAC,PN | Performed by: FAMILY MEDICINE

## 2020-10-22 NOTE — PROGRESS NOTES
Health Maintenance Due   Topic Date Due    Shingles Vaccine (1 of 2) 09/16/2000    Pneumococcal Vaccine (65+ High/Highest Risk) (1 of 2 - PCV13) 09/16/2015    Influenza Vaccine (1) 08/01/2020     Updates were requested from care everywhere.  Chart was reviewed for overdue Proactive [facility] Encounters (PRATEEK) topics (CRS, Breast Cancer Screening, Eye exam)  Health Maintenance has been updated.  LINKS immunization registry triggered.  Immunizations were reconciled.

## 2020-10-22 NOTE — PROGRESS NOTES
Subjective:       Patient ID: Zuly Person is a 70 y.o. female.    Chief Complaint: Procedure (Surgical clear, eyes itching, watery)    HPI   Ms. Person presents for surgical clearance. Scheduled for surgery on 10/26 for repair of wound dehiscence. No medication allergies, no previous issues with anesthesia. No chest pain or shortness of breath. Vitals stable.    Also has complains of itchy, water eyes. Symptoms present for the past 7-10 days. Resumed taking singulair approx 3 days ago. Using otc allergy eye drops, which is helping some. No cough, sneezing, fever, blurred vision, eye pain, or sinus pressure. Periodically has allergy issues, but her eyes typically aren't affected.    Review of Systems   Constitutional: Negative for chills, diaphoresis, fatigue and fever.   Eyes: Positive for redness and itching. Negative for photophobia and pain.   Respiratory: Negative for cough, shortness of breath, wheezing and stridor.        Past Medical History:   Diagnosis Date    Anxiety     Basal cell carcinoma     Cancer     breast cancer, dx      Past Surgical History:   Procedure Laterality Date    BREAST SURGERY      implants    CHOLECYSTECTOMY      HYSTERECTOMY      MASTECTOMY       Social History     Socioeconomic History    Marital status:      Spouse name: Not on file    Number of children: Not on file    Years of education: Not on file    Highest education level: Not on file   Occupational History    Not on file   Social Needs    Financial resource strain: Not on file    Food insecurity     Worry: Not on file     Inability: Not on file    Transportation needs     Medical: Not on file     Non-medical: Not on file   Tobacco Use    Smoking status: Former Smoker     Packs/day: 1.00     Years: 15.00     Pack years: 15.00     Types: Cigarettes     Quit date:      Years since quittin.8    Smokeless tobacco: Never Used   Substance and Sexual Activity    Alcohol use: Yes     Frequency: 4 or  "more times a week     Drinks per session: 1 or 2     Binge frequency: Never    Drug use: No    Sexual activity: Never   Lifestyle    Physical activity     Days per week: Not on file     Minutes per session: Not on file    Stress: Not on file   Relationships    Social connections     Talks on phone: Not on file     Gets together: Not on file     Attends Ten[employer] service: Not on file     Active member of club or organization: Not on file     Attends meetings of clubs or organizations: Not on file     Relationship status: Not on file   Other Topics Concern    Are you pregnant or think you may be? Not Asked    Breast-feeding Not Asked   Social History Narrative    Not on file     Family History   Problem Relation Age of Onset    Melanoma Child        Objective:      /82   Pulse 76   Temp 97.1 °F (36.2 °C) (Tympanic)   Ht 5' 6" (1.676 m)   Wt 90.3 kg (199 lb)   LMP  (LMP Unknown)   SpO2 98%   BMI 32.12 kg/m²   Physical Exam  Vitals signs reviewed.   Constitutional:       Appearance: She is obese. She is not ill-appearing or diaphoretic.   HENT:      Head: Normocephalic and atraumatic.   Eyes:      General:         Right eye: No discharge.         Left eye: No discharge.      Extraocular Movements: Extraocular movements intact.      Conjunctiva/sclera:      Right eye: Right conjunctiva is injected.      Left eye: Left conjunctiva is injected.      Comments: Mild inferior eyelid edema bilaterally   Neurological:      Mental Status: She is alert.         Assessment:       1. Pre-op evaluation    2. Allergic conjunctivitis of both eyes        Plan:       Pre-op evaluation  - patient clear for surgery, no anticipated issues    Allergic conjunctivitis of both eyes  - add claritin, zyrtec, or allegra to current regimen  - call or rtc if symptoms persist or worsen          Risks, benefits, and side effects were discussed with the patient. All questions were answered to the fullest satisfaction of the " patient, and pt verbalized understanding and agreement to treatment plan. Pt was to call with any new or worsening symptoms, or present to the ER.

## 2020-10-23 ENCOUNTER — LAB VISIT (OUTPATIENT)
Dept: PRIMARY CARE CLINIC | Facility: CLINIC | Age: 70
End: 2020-10-23
Payer: MEDICARE

## 2020-10-23 DIAGNOSIS — Z13.9 SCREENING FOR UNSPECIFIED CONDITION: ICD-10-CM

## 2020-10-23 DIAGNOSIS — Z03.818 ENCOUNTER FOR OBSERVATION FOR SUSPECTED EXPOSURE TO OTHER BIOLOGICAL AGENTS RULED OUT: ICD-10-CM

## 2020-10-23 PROCEDURE — U0003 INFECTIOUS AGENT DETECTION BY NUCLEIC ACID (DNA OR RNA); SEVERE ACUTE RESPIRATORY SYNDROME CORONAVIRUS 2 (SARS-COV-2) (CORONAVIRUS DISEASE [COVID-19]), AMPLIFIED PROBE TECHNIQUE, MAKING USE OF HIGH THROUGHPUT TECHNOLOGIES AS DESCRIBED BY CMS-2020-01-R: HCPCS

## 2020-10-24 LAB — SARS-COV-2 RNA RESP QL NAA+PROBE: NOT DETECTED

## 2020-10-25 NOTE — PROGRESS NOTES
Prior photo(s) of site(s) to confirm location(s):        Defibrillator: No  Pacemaker: No  Artificial heart valves: No  Artificial joints: No    ALLERGIES:   Patient has no known allergies.      Current Outpatient Medications:     albuterol (PROVENTIL/VENTOLIN HFA) 90 mcg/actuation inhaler, Inhale 2 puffs into the lungs every 6 (six) hours as needed for Wheezing. Rescue (Patient not taking: Reported on 10/22/2020), Disp: 3 Inhaler, Rfl: 0    aspirin (ECOTRIN) 81 MG EC tablet, Take 81 mg by mouth once daily., Disp: , Rfl:     atorvastatin (LIPITOR) 20 MG tablet, Take 1 tablet (20 mg total) by mouth once daily., Disp: 90 tablet, Rfl: 3    budesonide-formoterol 160-4.5 mcg (SYMBICORT) 160-4.5 mcg/actuation HFAA, Inhale 2 puffs into the lungs every 12 (twelve) hours. Controller (Patient not taking: Reported on 10/22/2020), Disp: 1 Inhaler, Rfl: 11    diclofenac (VOLTAREN) 75 MG EC tablet, TAKE 1 TABLET BY MOUTH TWICE DAILY, Disp: 180 tablet, Rfl: 1    FLUoxetine 40 MG capsule, Take 1 capsule (40 mg total) by mouth once daily., Disp: 90 capsule, Rfl: 3    fluticasone propionate (FLONASE) 50 mcg/actuation nasal spray, 2 sprays (100 mcg total) by Each Nostril route once daily. (Patient not taking: Reported on 10/22/2020), Disp: 16 g, Rfl: 11    montelukast (SINGULAIR) 10 mg tablet, Take 1 tablet (10 mg total) by mouth every evening., Disp: 90 tablet, Rfl: 3    omeprazole (PRILOSEC) 40 MG capsule, Take 1 capsule (40 mg total) by mouth once daily., Disp: 90 capsule, Rfl: 3    VIRTUSSIN AC  mg/5 mL syrup, TK 10 ML PO TID PRN., Disp: , Rfl: 0  -------------------------------------------------------------  PROCEDURE: [facility]    SITE: right lower eyelid    INDICATION: basal cell carcinoma in an area at increased risk of recurrence    CASE NUMBER: [id number]      ANESTHETIC: 2 mL 1% Lidocaine with Epinephrine 1:100,000    SURGICAL PREP: Ethanol and ophthalmic Betadine    SURGEON: Álvaro FELIX  MD Silke    ASSISTANTS: Maria Ines Felton CST     PREOPERATIVE DIAGNOSIS: basal cell carcinoma    POSTOPERATIVE DIAGNOSIS: basal cell carcinoma    PATHOLOGIC DIAGNOSIS: basal cell carcinoma    STAGES OF MOHS' SURGERY PERFORMED: one    TUMOR-FREE PLANE ACHIEVED: yes    HEMOSTASIS: Hyfrecation     SPECIMENS: one (one in stage A)    INITIAL LESION SIZE: 0.9 x 1.2 cm    FINAL DEFECT SIZE: 0.5 x 1.4 cm    WOUND REPAIR/DISPOSITION: see below    NARRATIVE:    The patient is a 70 y.o.female referred by Margi Toro MD with a history of cancer on the right lower eyelid which was biopsied - pathology accession #[id number]-, [facility]. Findings revealed basal cell carcinoma. Examination revealed a pink papule on the right lower eyelid at the site of prior biopsy, which was confirmed by reference to the photograph taken at the previous patient visit, as attached above. In light of the nature of this tumor and the location on the eyelid, Mohs' micrographic surgery was thought to be the most appropriate management choice, and this diagnosis is appropriate for treatment by Mohs' micrographic surgery.  I discussed it with the patient and she fully understands the aims, risks, alternatives, and possible complications, and elects to proceed.  There are no medical or surgical contraindications to the procedure.     A signed informed consent was obtained.    PROCEDURE:  The patient was placed in the semi-recumbent position on the operating table in the Mohs' Surgery Suite. The area in question was thoroughly prepped with ethanol and ophthalmic Betadine. A sterile surgical marker was used to outline the clinically apparent margins of the involved area, and a narrow margin of normal-appearing skin. Reference marks were made at the periphery of the outlined area with the surgical marker. The proposed area of excision was measured and photographed. Local anesthesia as noted above was administered.  The total volume of  "anesthetic used throughout this portion of the procedure was as documented above. The area was prepared and draped in the standard manner. All of the grossly identifiable area of clinically abnormal tissue and an underlying/peripheral layer was taken and processed by the Mohs' technique.  Hemostasis was obtained with the hyfrecator. Tissue was taken from any areas of residual marginal involvement (if present) and processed by the Mohs' technique in as many stages as needed until a tumor-free plane was achieved.    Colors of inks used in the reference nicks at epidermal margins (if present) and/or inking of non-epithelial edges, if applicable, is represented on the Mohs map as follows: solid lines represent red ink, dots represent blue ink, jagged lines represent black ink, curlicues represent green ink, "xxx" represents yellow ink.    The first Mohs' layer consisted of one section(s) with 3 slide(s) evaluated. No residual tumor was noted at the margins of the first Mohs' layer. Histology of the specimen(s) showed changes consistent with chronic solar damage.    A total of one section(s) and 3 slide(s) were examined under the microscope via the Mohs technique.  A cancer free plane was reached after layer number one. Defect final size was as noted above.      The wound was covered with a nonadherent dressing between stages, and the patient allowed to wait in the waiting area during these periods. The final defect was photographed at the completion of the Mohs' procedure.    The patient was returned to the procedure room following completion of the Mohs' procedure and final slide review. She is scheduled to see Kamilah Miranda MD for closure of the defect this afternoon as previously arranged.    Final dressing consisted of Telfa and tape.    Estimated blood loss for the total procedure was less than 3 mL.    Total operative time including tissue processing in the Mohs' laboratory and microscopic Mohs' frozen section slide " review was 1 hour(s). Verbal and written wound care instructions were given to the patient, and she expressed understanding of these instructions. The patient tolerated the procedure well and left the operating room in good condition; she is to return PRN to me.

## 2020-10-26 ENCOUNTER — TELEPHONE (OUTPATIENT)
Dept: OPHTHALMOLOGY | Facility: CLINIC | Age: 70
End: 2020-10-26

## 2020-10-26 ENCOUNTER — PROCEDURE VISIT (OUTPATIENT)
Dept: DERMATOLOGY | Facility: CLINIC | Age: 70
End: 2020-10-26
Payer: MEDICARE

## 2020-10-26 ENCOUNTER — PROCEDURE VISIT (OUTPATIENT)
Dept: OPHTHALMOLOGY | Facility: CLINIC | Age: 70
End: 2020-10-26
Payer: MEDICARE

## 2020-10-26 ENCOUNTER — NURSE TRIAGE (OUTPATIENT)
Dept: ADMINISTRATIVE | Facility: CLINIC | Age: 70
End: 2020-10-26

## 2020-10-26 VITALS
SYSTOLIC BLOOD PRESSURE: 144 MMHG | HEART RATE: 70 BPM | BODY MASS INDEX: 31.98 KG/M2 | HEIGHT: 66 IN | DIASTOLIC BLOOD PRESSURE: 85 MMHG | WEIGHT: 199 LBS

## 2020-10-26 VITALS — SYSTOLIC BLOOD PRESSURE: 139 MMHG | DIASTOLIC BLOOD PRESSURE: 74 MMHG | HEART RATE: 77 BPM

## 2020-10-26 DIAGNOSIS — C44.1122 BASAL CELL CARCINOMA OF LOWER EYELID, RIGHT: Primary | ICD-10-CM

## 2020-10-26 DIAGNOSIS — C44.1122 BASAL CELL CARCINOMA (BCC) OF SKIN OF RIGHT LOWER EYELID INCLUDING CANTHUS: Primary | ICD-10-CM

## 2020-10-26 PROCEDURE — 14060 PR ADJ TISS XFER LID,NOS,EAR <10 SQCM: ICD-10-PCS | Mod: E4,S$PBB,, | Performed by: OPHTHALMOLOGY

## 2020-10-26 PROCEDURE — 14060 TIS TRNFR E/N/E/L 10 SQ CM/<: CPT | Mod: PBBFAC | Performed by: OPHTHALMOLOGY

## 2020-10-26 PROCEDURE — 99499 NO LOS: ICD-10-PCS | Mod: S$PBB,,, | Performed by: DERMATOLOGY

## 2020-10-26 PROCEDURE — 14060 TIS TRNFR E/N/E/L 10 SQ CM/<: CPT | Mod: E4,S$PBB,, | Performed by: OPHTHALMOLOGY

## 2020-10-26 PROCEDURE — 99499 NO LOS: ICD-10-PCS | Mod: S$PBB,,, | Performed by: OPHTHALMOLOGY

## 2020-10-26 PROCEDURE — 99499 UNLISTED E&M SERVICE: CPT | Mod: S$PBB,,, | Performed by: DERMATOLOGY

## 2020-10-26 PROCEDURE — 99499 UNLISTED E&M SERVICE: CPT | Mod: S$PBB,,, | Performed by: OPHTHALMOLOGY

## 2020-10-26 RX ORDER — HYDROCODONE BITARTRATE AND ACETAMINOPHEN 5; 325 MG/1; MG/1
1 TABLET ORAL EVERY 6 HOURS PRN
Qty: 16 TABLET | Refills: 0 | Status: SHIPPED | OUTPATIENT
Start: 2020-10-26 | End: 2023-08-30

## 2020-10-26 NOTE — PROGRESS NOTES
HPI     Here for right cheek defect reconstruction.     Last edited by Kamilah Miranda MD on 10/26/2020  3:17 PM. (History)            Assessment /Plan     For exam results, see Encounter Report.    Basal cell carcinoma (BCC) of skin of right lower eyelid including canthus  -     HYDROcodone-acetaminophen (NORCO) 5-325 mg per tablet; Take 1 tablet by mouth every 6 (six) hours as needed for Pain.  Dispense: 16 tablet; Refill: 0      Here for right cheek and lower eyelid defect reconstruction.      Procedure Note    Attending: Kamilah Miranda  Resident:Oanh Deutsch  Pre-op Dx: Right lower eyelid/cheek 0.5 x 1.4 cm defect   Post-op Dx: same  Procedure: Adjacent tissue transfer (21439)  Local: 2% lidocaine with epinephrine with 0.5% marcaine and vitrase  Specimens:  none  Complications: None  Blood Loss: minimal    The patient was prepped and draped in the usual sterile manner for ophthalmic plastic surgery.  A corneal shield was placed in the right palpebral fissure. 3 cc of local anesthesia was given to the right lower eyelid, cheek, and right nasal bridge.    The defect was directly inferior to the nasal jugal fold.  The decision was made to horizontally reappose the tissues to reduce any down retraction on the right lower eyelid.  Extensive undermining with Choudhury scissors was used between the subcutaneous tissues and the medial cheek fat pad for approximately 5 mm in the medial direction.  The medial aspect of the incision could then be advanced laterally with minimal traction on the lateral eyelid defect.  Four interrupted 6 0 Vicryl S 14 sutures were placed from the superficial orbicularis of the eyelid to the subcutaneous tissue of the nasal wall.  This was then followed by running 6 0 Prolene on P3.  The cornea shield was removed. The patient tolerated the procedure well.  There were no complications.    Post-operative instructions were given to the patient.

## 2020-10-26 NOTE — TELEPHONE ENCOUNTER
Patient had ocular surgery today. Had 2 valium for surgery- was tense during surgery. Left hospital and took ibuprofen- got home and took hydrocodone and had a bourbon and coke. patient reports now mid- back pain.   Pain started after taking meds.  Rates pain 6 out fo 10.     Reason for Disposition   High-risk adult (e.g., history of cancer, HIV, or IV drug abuse)    Additional Information   Negative: Passed out (i.e., lost consciousness, collapsed and was not responding)   Negative: Shock suspected (e.g., cold/pale/clammy skin, too weak to stand, low BP, rapid pulse)   Negative: Sounds like a life-threatening emergency to the triager   Negative: [1] SEVERE back pain (e.g., excruciating) AND [2] sudden onset AND [3] age > 60   Negative: [1] Unable to urinate (or only a few drops) > 4 hours AND [2] bladder feels very full (e.g., palpable bladder or strong urge to urinate)   Negative: [1] Loss of bladder or bowel control (urine or bowel incontinence; wetting self, leaking stool) AND [2] new onset   Negative: Numbness in groin or rectal area (i.e., loss of sensation)   Negative: [1] SEVERE abdominal pain AND [2] present > 1 hour   Negative: [1] Abdominal pain AND [2] age > 60   Negative: Weakness of a leg or foot (e.g., unable to bear weight, dragging foot)   Negative: Unable to walk   Negative: Patient sounds very sick or weak to the triager   Negative: [1] SEVERE back pain (e.g., excruciating, unable to do any normal activities) AND [2] not improved 2 hours after pain medicine   Negative: [1] Pain radiates into the thigh or further down the leg AND [2] both legs   Negative: [1] Fever > 100.0 F (37.8 C) AND [2] flank pain (i.e., in side, below ribs and above hip)   Negative: [1] Pain or burning with passing urine (urination) AND [2] flank pain (i.e., in side, below ribs and above hip)   Negative: Numbness in a leg or foot (i.e., loss of sensation)   Negative: [1] Numbness in an arm or hand (i.e.,  loss of sensation) AND [2] upper back pain    Protocols used: BACK PAIN-A-AH

## 2020-11-03 ENCOUNTER — OFFICE VISIT (OUTPATIENT)
Dept: OPHTHALMOLOGY | Facility: CLINIC | Age: 70
End: 2020-11-03
Payer: MEDICARE

## 2020-11-03 DIAGNOSIS — Z98.890 POST-OPERATIVE STATE: Primary | ICD-10-CM

## 2020-11-03 DIAGNOSIS — H57.813 BROW PTOSIS, BILATERAL: ICD-10-CM

## 2020-11-03 DIAGNOSIS — H02.832 DERMATOCHALASIS OF UPPER AND LOWER EYELIDS OF BOTH EYES: ICD-10-CM

## 2020-11-03 DIAGNOSIS — C44.1122 BASAL CELL CARCINOMA (BCC) OF SKIN OF RIGHT LOWER EYELID INCLUDING CANTHUS: ICD-10-CM

## 2020-11-03 DIAGNOSIS — H02.834 DERMATOCHALASIS OF UPPER AND LOWER EYELIDS OF BOTH EYES: ICD-10-CM

## 2020-11-03 DIAGNOSIS — H02.831 DERMATOCHALASIS OF UPPER AND LOWER EYELIDS OF BOTH EYES: ICD-10-CM

## 2020-11-03 DIAGNOSIS — H02.835 DERMATOCHALASIS OF UPPER AND LOWER EYELIDS OF BOTH EYES: ICD-10-CM

## 2020-11-03 PROCEDURE — 99024 POSTOP FOLLOW-UP VISIT: CPT | Mod: POP,,, | Performed by: OPHTHALMOLOGY

## 2020-11-03 PROCEDURE — 99024 PR POST-OP FOLLOW-UP VISIT: ICD-10-PCS | Mod: POP,,, | Performed by: OPHTHALMOLOGY

## 2020-11-03 PROCEDURE — 92285 EXTERNAL PHOTOGRAPHY - OU - BOTH EYES: ICD-10-PCS | Mod: 26,S$PBB,, | Performed by: OPHTHALMOLOGY

## 2020-11-03 PROCEDURE — 99999 PR PBB SHADOW E&M-EST. PATIENT-LVL III: CPT | Mod: PBBFAC,,, | Performed by: OPHTHALMOLOGY

## 2020-11-03 PROCEDURE — 99213 OFFICE O/P EST LOW 20 MIN: CPT | Mod: PBBFAC,25 | Performed by: OPHTHALMOLOGY

## 2020-11-03 PROCEDURE — 92285 EXTERNAL OCULAR PHOTOGRAPHY: CPT | Mod: PBBFAC | Performed by: OPHTHALMOLOGY

## 2020-11-03 PROCEDURE — 99999 PR PBB SHADOW E&M-EST. PATIENT-LVL III: ICD-10-PCS | Mod: PBBFAC,,, | Performed by: OPHTHALMOLOGY

## 2020-11-03 NOTE — PROGRESS NOTES
HPI     Zuly Person is a/an 70 y.o. female here for 1 week post-op.  Pt denies any pain or problems.  Date of Procedure: 10/26/20  Procedure: MOH's  Oral antibiotics: none  Eye meds: Maxitrol andrew TID OD      Last edited by Wilfredo Estrada on 11/3/2020  1:29 PM. (History)            Assessment /Plan     For exam results, see Encounter Report.    Post-operative state  -     External/Slit Lamp Photography    Basal cell carcinoma (BCC) of skin of right lower eyelid including canthus    Brow ptosis, bilateral    Dermatochalasis of upper and lower eyelids of both eyes      Patient doing well! Post-operative instructions reviewed. Sutures removed. All questions answered.  Return in 5 weeks prn sooner any worsening of vision/symptoms or any concerns.      Patient is postop week 1 status post right lower eyelid reconstruction.  She is doing excellent.    The patient is interested and cosmetic enhancement of her brows and eyelids.    On exam, she has chronic frontalis use with bilateral temporal brow ptosis worse on the left than the right.  She has bilateral upper eyelid dermatochalasis.  She also has bilateral lower eyelid dermatochalasis with temporal herniated orbital fat.  She has mild bilateral malar festoons.    Recommend bilateral internal brow pexy left greater than right, bilateral upper eyelid blepharoplasty, bilateral lower eyelids subtractive blepharoplasty with pinch and canthoplasty.    Patient will consider these procedures in the near future.  She would like to be under general anesthesia.    Return in 5 weeks sooner any worsening

## 2021-03-09 ENCOUNTER — TELEPHONE (OUTPATIENT)
Dept: FAMILY MEDICINE | Facility: CLINIC | Age: 71
End: 2021-03-09

## 2021-03-09 DIAGNOSIS — N63.0 BREAST LUMP: Primary | ICD-10-CM

## 2021-03-09 DIAGNOSIS — Z13.220 ENCOUNTER FOR LIPID SCREENING FOR CARDIOVASCULAR DISEASE: ICD-10-CM

## 2021-03-09 DIAGNOSIS — Z13.6 ENCOUNTER FOR LIPID SCREENING FOR CARDIOVASCULAR DISEASE: ICD-10-CM

## 2021-03-09 DIAGNOSIS — C50.919 MALIGNANT NEOPLASM OF FEMALE BREAST, UNSPECIFIED ESTROGEN RECEPTOR STATUS, UNSPECIFIED LATERALITY, UNSPECIFIED SITE OF BREAST: ICD-10-CM

## 2021-03-09 DIAGNOSIS — Z00.00 ANNUAL PHYSICAL EXAM: Primary | ICD-10-CM

## 2021-03-09 DIAGNOSIS — G45.9 TIA (TRANSIENT ISCHEMIC ATTACK): ICD-10-CM

## 2021-03-09 DIAGNOSIS — Z90.13 H/O BILATERAL MASTECTOMY: ICD-10-CM

## 2021-03-12 ENCOUNTER — LAB VISIT (OUTPATIENT)
Dept: LAB | Facility: HOSPITAL | Age: 71
End: 2021-03-12
Attending: NURSE PRACTITIONER
Payer: MEDICARE

## 2021-03-12 DIAGNOSIS — Z13.6 ENCOUNTER FOR LIPID SCREENING FOR CARDIOVASCULAR DISEASE: ICD-10-CM

## 2021-03-12 DIAGNOSIS — Z00.00 ANNUAL PHYSICAL EXAM: ICD-10-CM

## 2021-03-12 DIAGNOSIS — Z13.220 ENCOUNTER FOR LIPID SCREENING FOR CARDIOVASCULAR DISEASE: ICD-10-CM

## 2021-03-12 DIAGNOSIS — C50.919 MALIGNANT NEOPLASM OF FEMALE BREAST, UNSPECIFIED ESTROGEN RECEPTOR STATUS, UNSPECIFIED LATERALITY, UNSPECIFIED SITE OF BREAST: ICD-10-CM

## 2021-03-12 LAB
ALBUMIN SERPL BCP-MCNC: 3.8 G/DL (ref 3.5–5.2)
ALP SERPL-CCNC: 74 U/L (ref 55–135)
ALT SERPL W/O P-5'-P-CCNC: 14 U/L (ref 10–44)
ANION GAP SERPL CALC-SCNC: 7 MMOL/L (ref 8–16)
AST SERPL-CCNC: 17 U/L (ref 10–40)
BASOPHILS # BLD AUTO: 0.02 K/UL (ref 0–0.2)
BASOPHILS NFR BLD: 0.5 % (ref 0–1.9)
BILIRUB SERPL-MCNC: 0.9 MG/DL (ref 0.1–1)
BUN SERPL-MCNC: 10 MG/DL (ref 8–23)
CALCIUM SERPL-MCNC: 8.9 MG/DL (ref 8.7–10.5)
CHLORIDE SERPL-SCNC: 104 MMOL/L (ref 95–110)
CHOLEST SERPL-MCNC: 234 MG/DL (ref 120–199)
CHOLEST/HDLC SERPL: 3.6 {RATIO} (ref 2–5)
CO2 SERPL-SCNC: 28 MMOL/L (ref 23–29)
CREAT SERPL-MCNC: 0.5 MG/DL (ref 0.5–1.4)
DIFFERENTIAL METHOD: ABNORMAL
EOSINOPHIL # BLD AUTO: 0.1 K/UL (ref 0–0.5)
EOSINOPHIL NFR BLD: 2.2 % (ref 0–8)
ERYTHROCYTE [DISTWIDTH] IN BLOOD BY AUTOMATED COUNT: 12.6 % (ref 11.5–14.5)
EST. GFR  (AFRICAN AMERICAN): >60 ML/MIN/1.73 M^2
EST. GFR  (NON AFRICAN AMERICAN): >60 ML/MIN/1.73 M^2
GLUCOSE SERPL-MCNC: 108 MG/DL (ref 70–110)
HCT VFR BLD AUTO: 42 % (ref 37–48.5)
HDLC SERPL-MCNC: 65 MG/DL (ref 40–75)
HDLC SERPL: 27.8 % (ref 20–50)
HGB BLD-MCNC: 13.9 G/DL (ref 12–16)
IMM GRANULOCYTES # BLD AUTO: 0.01 K/UL (ref 0–0.04)
IMM GRANULOCYTES NFR BLD AUTO: 0.2 % (ref 0–0.5)
LDLC SERPL CALC-MCNC: 155.4 MG/DL (ref 63–159)
LYMPHOCYTES # BLD AUTO: 1.2 K/UL (ref 1–4.8)
LYMPHOCYTES NFR BLD: 28.2 % (ref 18–48)
MCH RBC QN AUTO: 30.5 PG (ref 27–31)
MCHC RBC AUTO-ENTMCNC: 33.1 G/DL (ref 32–36)
MCV RBC AUTO: 92 FL (ref 82–98)
MONOCYTES # BLD AUTO: 0.4 K/UL (ref 0.3–1)
MONOCYTES NFR BLD: 9.9 % (ref 4–15)
NEUTROPHILS # BLD AUTO: 2.5 K/UL (ref 1.8–7.7)
NEUTROPHILS NFR BLD: 59 % (ref 38–73)
NONHDLC SERPL-MCNC: 169 MG/DL
NRBC BLD-RTO: 0 /100 WBC
PLATELET # BLD AUTO: 144 K/UL (ref 150–350)
PMV BLD AUTO: 12.9 FL (ref 9.2–12.9)
POTASSIUM SERPL-SCNC: 3.7 MMOL/L (ref 3.5–5.1)
PROT SERPL-MCNC: 6.7 G/DL (ref 6–8.4)
RBC # BLD AUTO: 4.55 M/UL (ref 4–5.4)
SODIUM SERPL-SCNC: 139 MMOL/L (ref 136–145)
TRIGL SERPL-MCNC: 68 MG/DL (ref 30–150)
WBC # BLD AUTO: 4.15 K/UL (ref 3.9–12.7)

## 2021-03-12 PROCEDURE — 80061 LIPID PANEL: CPT | Performed by: NURSE PRACTITIONER

## 2021-03-12 PROCEDURE — 85025 COMPLETE CBC W/AUTO DIFF WBC: CPT | Performed by: NURSE PRACTITIONER

## 2021-03-12 PROCEDURE — 36415 COLL VENOUS BLD VENIPUNCTURE: CPT | Performed by: NURSE PRACTITIONER

## 2021-03-12 PROCEDURE — 80053 COMPREHEN METABOLIC PANEL: CPT | Performed by: NURSE PRACTITIONER

## 2021-03-19 ENCOUNTER — HOSPITAL ENCOUNTER (OUTPATIENT)
Dept: RADIOLOGY | Facility: HOSPITAL | Age: 71
Discharge: HOME | End: 2021-03-19
Attending: NURSE PRACTITIONER
Payer: MEDICARE

## 2021-03-19 DIAGNOSIS — N63.0 BREAST LUMP: ICD-10-CM

## 2021-03-19 DIAGNOSIS — Z90.13 H/O BILATERAL MASTECTOMY: ICD-10-CM

## 2021-03-19 PROCEDURE — 76642 ULTRASOUND BREAST LIMITED: CPT | Mod: 26,LT,, | Performed by: RADIOLOGY

## 2021-03-19 PROCEDURE — 76642 US BREAST LEFT LIMITED: ICD-10-PCS | Mod: 26,LT,, | Performed by: RADIOLOGY

## 2021-03-19 PROCEDURE — 76642 ULTRASOUND BREAST LIMITED: CPT | Mod: TC,LT

## 2021-09-23 ENCOUNTER — TELEPHONE (OUTPATIENT)
Dept: FAMILY MEDICINE | Facility: CLINIC | Age: 71
End: 2021-09-23

## 2021-09-23 DIAGNOSIS — R60.9 EDEMA, UNSPECIFIED TYPE: Primary | ICD-10-CM

## 2021-09-24 RX ORDER — POTASSIUM CHLORIDE 20 MEQ/1
20 TABLET, EXTENDED RELEASE ORAL DAILY
Qty: 5 TABLET | Refills: 0 | Status: SHIPPED | OUTPATIENT
Start: 2021-09-24 | End: 2021-09-29

## 2021-09-24 RX ORDER — FUROSEMIDE 20 MG/1
20 TABLET ORAL DAILY
Qty: 5 TABLET | Refills: 0 | Status: SHIPPED | OUTPATIENT
Start: 2021-09-24 | End: 2023-08-30

## 2021-10-17 ENCOUNTER — HOSPITAL ENCOUNTER (EMERGENCY)
Facility: HOSPITAL | Age: 71
Discharge: HOME | End: 2021-10-17
Attending: INTERNAL MEDICINE
Payer: MEDICARE

## 2021-10-17 VITALS
HEART RATE: 82 BPM | SYSTOLIC BLOOD PRESSURE: 148 MMHG | DIASTOLIC BLOOD PRESSURE: 86 MMHG | OXYGEN SATURATION: 99 % | RESPIRATION RATE: 19 BRPM | WEIGHT: 192 LBS | HEIGHT: 66 IN | BODY MASS INDEX: 30.86 KG/M2 | TEMPERATURE: 98 F
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
## 2021-10-17 DIAGNOSIS — L08.9 TOE ABRASION, INFECTED, RIGHT, INITIAL ENCOUNTER: ICD-10-CM

## 2021-10-17 DIAGNOSIS — S93.402A SPRAIN OF LEFT ANKLE, UNSPECIFIED LIGAMENT, INITIAL ENCOUNTER: Primary | ICD-10-CM

## 2021-10-17 DIAGNOSIS — R52 PAIN: ICD-10-CM

## 2021-10-17 DIAGNOSIS — S90.414A TOE ABRASION, INFECTED, RIGHT, INITIAL ENCOUNTER: ICD-10-CM

## 2021-10-17 PROCEDURE — 73610 X-RAY EXAM OF ANKLE: CPT | Mod: 26,LT,, | Performed by: RADIOLOGY

## 2021-10-17 PROCEDURE — 63600175 PHARM REV CODE 636 W HCPCS: Performed by: INTERNAL MEDICINE

## 2021-10-17 PROCEDURE — 99284 EMERGENCY DEPT VISIT MOD MDM: CPT | Mod: 25

## 2021-10-17 PROCEDURE — 73610 X-RAY EXAM OF ANKLE: CPT | Mod: TC,FY,LT

## 2021-10-17 PROCEDURE — 96372 THER/PROPH/DIAG INJ SC/IM: CPT

## 2021-10-17 PROCEDURE — 73610 XR ANKLE COMPLETE 3 VIEW LEFT: ICD-10-PCS | Mod: 26,LT,, | Performed by: RADIOLOGY

## 2021-10-17 RX ORDER — MELOXICAM 7.5 MG/1
7.5 TABLET ORAL DAILY
Qty: 14 TABLET | Refills: 0 | Status: SHIPPED | OUTPATIENT
Start: 2021-10-17 | End: 2021-10-31

## 2021-10-17 RX ORDER — MUPIROCIN 20 MG/G
OINTMENT TOPICAL 3 TIMES DAILY
Qty: 15 G | Refills: 0 | Status: SHIPPED | OUTPATIENT
Start: 2021-10-17 | End: 2023-08-30

## 2021-10-17 RX ORDER — KETOROLAC TROMETHAMINE 30 MG/ML
30 INJECTION, SOLUTION INTRAMUSCULAR; INTRAVENOUS
Status: COMPLETED | OUTPATIENT
Start: 2021-10-17 | End: 2021-10-17

## 2021-10-17 RX ADMIN — KETOROLAC TROMETHAMINE 30 MG: 30 INJECTION, SOLUTION INTRAMUSCULAR at 09:10

## 2022-01-11 ENCOUNTER — PATIENT MESSAGE (OUTPATIENT)
Dept: ADMINISTRATIVE | Facility: HOSPITAL | Age: 72
End: 2022-01-11
Payer: MEDICARE

## 2022-03-30 ENCOUNTER — TELEPHONE (OUTPATIENT)
Dept: PULMONOLOGY | Facility: CLINIC | Age: 72
End: 2022-03-30
Payer: MEDICARE

## 2022-03-30 NOTE — TELEPHONE ENCOUNTER
Tried to call patient but no answer. Patient should make office follow up since she has not been seen in 3 years.  ----- Message from Brian Conroy sent at 3/30/2022 10:30 AM CDT -----  Contact: patient  Type: Needs Medical Advice  Who Called:  patient  Symptoms (please be specific):  persistent cough  How long has patient had these symptoms:  ongoing  Pharmacy name and phone #:    [id number] - [place], [place] - [address] [place] [address] AT [facility] [address]  [address] [address]  Phone: [phone] Fax: [phone]      Best Call Back Number: [phone]  Additional Information: Patient called in and wanted to know if Dr. Herndon would call in a Rx she once had in 2018?    VIRTUSSIN AC  mg/5 mL syrup  0 12/19/2018  Yes  Sig: TK 10 ML PO TID PRN.  Class: Historical Med

## 2022-04-12 ENCOUNTER — OFFICE VISIT (OUTPATIENT)
Dept: PULMONOLOGY | Facility: CLINIC | Age: 72
End: 2022-04-12
Payer: MEDICARE

## 2022-04-12 VITALS
OXYGEN SATURATION: 96 % | SYSTOLIC BLOOD PRESSURE: 145 MMHG | WEIGHT: 195.88 LBS | HEART RATE: 74 BPM | HEIGHT: 66 IN | DIASTOLIC BLOOD PRESSURE: 75 MMHG | BODY MASS INDEX: 31.48 KG/M2

## 2022-04-12 DIAGNOSIS — J45.21 MILD INTERMITTENT ASTHMA WITH ACUTE EXACERBATION: Primary | ICD-10-CM

## 2022-04-12 DIAGNOSIS — R05.3 CHRONIC COUGH: ICD-10-CM

## 2022-04-12 DIAGNOSIS — R09.89 CHRONIC SINUS COMPLAINTS: ICD-10-CM

## 2022-04-12 PROCEDURE — 99999 PR PBB SHADOW E&M-EST. PATIENT-LVL IV: ICD-10-PCS | Mod: PBBFAC,,, | Performed by: INTERNAL MEDICINE

## 2022-04-12 PROCEDURE — 99999 PR PBB SHADOW E&M-EST. PATIENT-LVL IV: CPT | Mod: PBBFAC,,, | Performed by: INTERNAL MEDICINE

## 2022-04-12 PROCEDURE — 99204 PR OFFICE/OUTPT VISIT, NEW, LEVL IV, 45-59 MIN: ICD-10-PCS | Mod: S$PBB,,, | Performed by: INTERNAL MEDICINE

## 2022-04-12 PROCEDURE — 99214 OFFICE O/P EST MOD 30 MIN: CPT | Mod: PBBFAC,PO | Performed by: INTERNAL MEDICINE

## 2022-04-12 PROCEDURE — 99204 OFFICE O/P NEW MOD 45 MIN: CPT | Mod: S$PBB,,, | Performed by: INTERNAL MEDICINE

## 2022-04-12 RX ORDER — FLUTICASONE FUROATE, UMECLIDINIUM BROMIDE AND VILANTEROL TRIFENATATE 200; 62.5; 25 UG/1; UG/1; UG/1
1 POWDER RESPIRATORY (INHALATION) DAILY
Qty: 60 EACH | Refills: 11 | Status: SHIPPED | OUTPATIENT
Start: 2022-04-12 | End: 2022-04-28

## 2022-04-12 RX ORDER — PREDNISONE 20 MG/1
TABLET ORAL
Qty: 15 TABLET | Refills: 0 | Status: SHIPPED | OUTPATIENT
Start: 2022-04-12 | End: 2023-08-30

## 2022-04-12 RX ORDER — CODEINE PHOSPHATE AND GUAIFENESIN 10; 100 MG/5ML; MG/5ML
5 SOLUTION ORAL EVERY 8 HOURS PRN
Qty: 236 ML | Refills: 0 | Status: SHIPPED | OUTPATIENT
Start: 2022-04-12 | End: 2022-04-22

## 2022-04-12 NOTE — PROGRESS NOTES
4/12/2022    Zuly Person  In office visit    Chief Complaint   Patient presents with    Asthma     LOV : 12/2018       HPI:   4/12/2022  Was doing well, hadn't coughed or had issues breathing for approximately 9 months - stopped Singulair, Symbicort at that time.   Cough: Has worsened over the last few months, productive with clear mucous. Associated with nocturnal arousals, vomiting, wheezing. Worse at night time. Is overall improving compared to prior - however still persistent and bothersome. Used Lozenges with minimal benefit. States codeine cough syrup has helped in the past. Associated with PND, sinus congestion, runny nose. Has used Albuterol rescue inhaler over the last few nights, doesn't appreciate benefit.   Diagnosed with the flu, acute bronchitis approximately 2 weeks ago while in [place]. Prescribed abx (unknown name) and Tamiflu which patient reports completion. Follows with a Pulmonologist in [place] as well. States had Spirometry done in 2020 - reports it showed mild asthma.   Approximately one month ago she started using Symbicort, Flonase, and Singulair without noted benefit. States Symbicort is $300/month.  States overall breathing is okay. Denies frequent infections, abx, steroid use.   Cough variant asthma?       12/19/2018 -  Had return cough, albuterol may have worsened and then improved.  Relapse 1st November and started symbicort regular, took prednisone for 3 days last wk with good response then relapse.  Had tickle in throat. Pt has wheeze intermittently.  Patient Instructions   Asthma by history, breathing test needed to determine if breathing impaired.   specail asthma test useful if no response.  Asthma prevention- symbicort 2 twice daily, decrease frequency and severity of asthma  Rescue - albuterol 2-3 with spacer as needed, call in if wish nebulizer machine?  May trigger cough less over 15 minutes needed to use  Action plan - will put asthma into remission if high enough dose long  enough given.  symbicort alone may work if very mild  May skip flonase /singular if doing well  Azithromycin if cough yellow mucous.  Codeine suppresses cough- will not treat asthma.    10/9/2018 pt stopped smoking 20 yrs ago with birth grandson.  Started coughing 18 yrs ago with resolution with gerd rx.  Pt no limits with chores- no exercise.   good, lives [facility].    Sinus ok.  No asthma.   Had chronic cough - nocturnal worsening. Ongoing for yrs- seasonally worse in spring/summer- better last wks.   Used albuterol once with no benefit.  No wheezes.   Patient Instructions   You have mild intermittent asthma.   Impairment is measured by lung capacity- over 50% is not bad- below 80% would suggest component of copd.   Would recommend trial symbicort 2 twice daily (bedtime only ok)- use mainly when cough active or if helps.   Use albutrerol as needed - 2-3 puffs every 4 hrs as needed for cough.   If cough bad- may use prednisone as would clear asthma dramatically.   You have component of upper airway syndrome contributing to cough- flonase 1-2 each side, or singulair should help.   cxr no report of concern for cancer.   Follow up if needed/etc....         The chief compliant  problem varies with instability at times.  PFSH:  Past Medical History:   Diagnosis Date    Anxiety     Basal cell carcinoma     Cancer     breast cancer, dx          Past Surgical History:   Procedure Laterality Date    BREAST SURGERY      implants    CHOLECYSTECTOMY      HYSTERECTOMY      MASTECTOMY       Social History     Tobacco Use    Smoking status: Former Smoker     Packs/day: 1.00     Years: 15.00     Pack years: 15.00     Types: Cigarettes     Quit date:      Years since quittin.2    Smokeless tobacco: Never Used   Substance Use Topics    Alcohol use: Yes    Drug use: No     Family History   Problem Relation Age of Onset    Melanoma Child      Review of patient's allergies indicates:  No Known  "Allergies    Performance Status:The patient's activity level is no limits with regular activity.      Review of Systems:  a review of eleven systems covering constitutional, Eye, HEENT, Psych, Respiratory, Cardiac, GI, , Musculoskeletal, Endocrine, Dermatologic was negative except for pertinent findings as listed ABOVE and below:  pertinent positive as above, rest is good       Exam:Comprehensive exam done. BP (!) 145/75 (BP Location: Left arm, Patient Position: Sitting, BP Method: Medium (Automatic)) Comment (BP Method): long cuff  Pulse 74   Ht 5' 6" (1.676 m)   Wt 88.9 kg (195 lb 14.1 oz)   LMP  (LMP Unknown)   SpO2 96% Comment: on room air at rest  BMI 31.62 kg/m²   Exam included Vitals as listed, and patient's appearance and affect and alertness and mood, oral exam for yeast and hygiene and pharynx lesions and Mallapatti (M) score, neck with inspection for jvd and masses and thyroid abnormalities and lymph nodes (supraclavicular and infraclavicular nodes and axillary also examined and noted if abn), chest exam included symmetry and effort and fremitus and percussion and auscultation, cardiac exam included rhythm and gallops and murmur and rubs and jvd and edema, abdominal exam for mass and hepatosplenomegaly and tenderness and hernias and bowel sounds, Musculoskeletal exam with muscle tone and posture and mobility/gait and  strength, and skin for rashes and cyanosis and pallor and turgor, extremity for clubbing.  Findings were normal except for pertinent findings listed below:  M2, chest is symmetric, no distress, normal percussion, normal fremitus and good normal breath sounds.      Radiographs (ct chest and cxr) reviewed: results reviewed  Copd and ild changes?    Labs   Lab Results   Component Value Date    WBC 4.15 03/12/2021    HGB 13.9 03/12/2021    HCT 42.0 03/12/2021    MCV 92 03/12/2021     (L) 03/12/2021       CMP  Sodium   Date Value Ref Range Status   03/12/2021 139 136 - 145 " mmol/L Final     Potassium   Date Value Ref Range Status   03/12/2021 3.7 3.5 - 5.1 mmol/L Final     Chloride   Date Value Ref Range Status   03/12/2021 104 95 - 110 mmol/L Final     CO2   Date Value Ref Range Status   03/12/2021 28 23 - 29 mmol/L Final     Glucose   Date Value Ref Range Status   03/12/2021 108 70 - 110 mg/dL Final     BUN   Date Value Ref Range Status   03/12/2021 10 8 - 23 mg/dL Final     Creatinine   Date Value Ref Range Status   03/12/2021 0.5 0.5 - 1.4 mg/dL Final     Calcium   Date Value Ref Range Status   03/12/2021 8.9 8.7 - 10.5 mg/dL Final     Total Protein   Date Value Ref Range Status   03/12/2021 6.7 6.0 - 8.4 g/dL Final     Albumin   Date Value Ref Range Status   03/12/2021 3.8 3.5 - 5.2 g/dL Final     Total Bilirubin   Date Value Ref Range Status   03/12/2021 0.9 0.1 - 1.0 mg/dL Final     Comment:     For infants and newborns, interpretation of results should be based  on gestational age, weight and in agreement with clinical  observations.    Premature Infant recommended reference ranges:  Up to 24 hours.............<8.0 mg/dL  Up to 48 hours............<12.0 mg/dL  3-5 days..................<15.0 mg/dL  6-29 days.................<15.0 mg/dL       Alkaline Phosphatase   Date Value Ref Range Status   03/12/2021 74 55 - 135 U/L Final     AST   Date Value Ref Range Status   03/12/2021 17 10 - 40 U/L Final     ALT   Date Value Ref Range Status   03/12/2021 14 10 - 44 U/L Final     Anion Gap   Date Value Ref Range Status   03/12/2021 7 (L) 8 - 16 mmol/L Final     eGFR if    Date Value Ref Range Status   03/12/2021 >60.0 >60 mL/min/1.73 m^2 Final     eGFR if non    Date Value Ref Range Status   03/12/2021 >60.0 >60 mL/min/1.73 m^2 Final     Comment:     Calculation used to obtain the estimated glomerular filtration  rate (eGFR) is the CKD-EPI equation.            PFT will be done and results to be reviewed       Plan:  Clinical impression is apparently  straight forward and impression with management as below.      Zuly was seen today for asthma.    Diagnoses and all orders for this visit:    Mild intermittent asthma with acute exacerbation  -     predniSONE (DELTASONE) 20 MG tablet; Take one pill per day for five days. Repeat as needed for cough.  -     fluticasone-umeclidin-vilanter (TRELEGY ELLIPTA) 200-62.5-25 mcg inhaler; Inhale 1 puff into the lungs once daily.    Chronic cough  -     guaiFENesin-codeine 100-10 mg/5 ml (TUSSI-ORGANIDIN NR)  mg/5 mL syrup; Take 5 mLs by mouth every 8 (eight) hours as needed for Cough.    Chronic sinus complaints        Follow up in about 2 months (around 6/12/2022), or if symptoms worsen or fail to improve.    Discussed with patient above for education the following:      Patient Instructions   Prednisone - take one pill per day for five days. Can repeat as needed for cough.    Codeine cough syrup to be taken as needed for cough - caution as this will make you drowsy. Don't operate heavy machinery or drive after use.    This inhaler Trelegy is your new daily inhaler. It contains an inhaled steroid component. Rinse mouth after each use due to risk for thrush development. If mouth or tongue develops white sores please contact the clinic and I will order a prescription mouth wash.     Continue current medication regiment. Keep follow up appointment as scheduled. Please call the office if you have any questions or concerns.

## 2022-04-12 NOTE — PATIENT INSTRUCTIONS
Prednisone - take one pill per day for five days. Can repeat as needed for cough.    Codeine cough syrup to be taken as needed for cough - caution as this will make you drowsy. Don't operate heavy machinery or drive after use.    This inhaler Trelegy is your new daily inhaler. It contains an inhaled steroid component. Rinse mouth after each use due to risk for thrush development. If mouth or tongue develops white sores please contact the clinic and I will order a prescription mouth wash.     Continue current medication regiment. Keep follow up appointment as scheduled. Please call the office if you have any questions or concerns.

## 2022-04-27 DIAGNOSIS — R09.89 CHRONIC SINUS COMPLAINTS: ICD-10-CM

## 2022-04-27 DIAGNOSIS — R05.3 CHRONIC COUGH: ICD-10-CM

## 2022-04-27 DIAGNOSIS — J45.21 MILD INTERMITTENT ASTHMA WITH ACUTE EXACERBATION: Primary | ICD-10-CM

## 2022-04-27 NOTE — TELEPHONE ENCOUNTER
Patient called and states that her insurance will cover breo and asking if this can be called into instead of other inhaler?      ----- Message from Sena Walker sent at 4/27/2022 10:08 AM CDT -----  Contact: self  Type:  RX Refill Request    Who Called:  patient  Refill or New Rx:  new rx  RX Name and Strength:  breo inhaler   How is the patient currently taking it? (ex. 1XDay):  as directed  Is this a 30 day or 90 day RX:  30  Preferred Pharmacy with phone number:    [facility][id number] - [place], [place] - [address] [place] [address] AT [address]  [address]  [place] [address]  Phone: [phone] Fax: [phone]  Local or Mail Order:  local  Ordering Provider:  Nita Parrish  Best Call Back Number:  [phone] (home)   Additional Information:  Patient states that her insurance will pay for his inhaler so please get this sent over and thanks

## 2022-04-28 ENCOUNTER — PATIENT MESSAGE (OUTPATIENT)
Dept: PULMONOLOGY | Facility: CLINIC | Age: 72
End: 2022-04-28
Payer: MEDICARE

## 2022-04-28 DIAGNOSIS — J45.21 MILD INTERMITTENT ASTHMA WITH ACUTE EXACERBATION: Primary | ICD-10-CM

## 2022-04-28 RX ORDER — FLUTICASONE FUROATE AND VILANTEROL TRIFENATATE 200; 25 UG/1; UG/1
1 POWDER RESPIRATORY (INHALATION) DAILY
Qty: 1 EACH | Refills: 11 | Status: SHIPPED | OUTPATIENT
Start: 2022-04-28 | End: 2023-08-30

## 2022-04-28 RX ORDER — FLUTICASONE FUROATE AND VILANTEROL TRIFENATATE 200; 25 UG/1; UG/1
1 POWDER RESPIRATORY (INHALATION) DAILY
Qty: 60 EACH | Refills: 11 | Status: SHIPPED | OUTPATIENT
Start: 2022-04-28 | End: 2023-08-30

## 2022-05-09 ENCOUNTER — PATIENT OUTREACH (OUTPATIENT)
Dept: ADMINISTRATIVE | Facility: OTHER | Age: 72
End: 2022-05-09
Payer: MEDICARE

## 2022-05-09 NOTE — PROGRESS NOTES
Patient's chart was reviewed for overdue PRATEEK ( Proactive [facility] Encounters) Topics  Requested updates within Care Everywhere.  LINKS immunization registry triggered  Patient was not found in LINKS.   Health Maintenance was updated.

## 2022-05-10 ENCOUNTER — TELEPHONE (OUTPATIENT)
Dept: PULMONOLOGY | Facility: CLINIC | Age: 72
End: 2022-05-10
Payer: MEDICARE

## 2022-05-10 NOTE — TELEPHONE ENCOUNTER
Sent message to provider.   ----- Message from Ingris Hernandez sent at 5/10/2022 10:46 AM CDT -----  Contact: patient  Type: Needs Medical Advice  Who Called:  patient   Symptoms (please be specific):    How long has patient had these symptoms:    Pharmacy name and phone #:    [id number] - [place], [place] - [address] [place] [address] AT [address]  [address] [place] [address]  Phone: [phone] Fax: [phone]    Best Call Back Number: [phone] (home)     Additional Information: Inhalers are all to expensive, would like to try the nebulizer.

## 2022-05-11 DIAGNOSIS — M25.561 RIGHT KNEE PAIN, UNSPECIFIED CHRONICITY: Primary | ICD-10-CM

## 2022-05-12 DIAGNOSIS — J45.21 MILD INTERMITTENT ASTHMA WITH ACUTE EXACERBATION: Primary | ICD-10-CM

## 2022-05-12 RX ORDER — ARFORMOTEROL TARTRATE 15 UG/2ML
15 SOLUTION RESPIRATORY (INHALATION) EVERY 12 HOURS
Qty: 75 ML | Refills: 11 | OUTPATIENT
Start: 2022-05-12 | End: 2022-06-13 | Stop reason: SDUPTHER

## 2022-05-12 RX ORDER — BUDESONIDE 0.5 MG/2ML
0.5 INHALANT ORAL 2 TIMES DAILY
Qty: 120 ML | Refills: 5 | OUTPATIENT
Start: 2022-05-12 | End: 2022-06-13 | Stop reason: SDUPTHER

## 2022-05-13 ENCOUNTER — TELEPHONE (OUTPATIENT)
Dept: FAMILY MEDICINE | Facility: CLINIC | Age: 72
End: 2022-05-13
Payer: MEDICARE

## 2022-05-13 DIAGNOSIS — J45.21 MILD INTERMITTENT ASTHMA WITH ACUTE EXACERBATION: Primary | ICD-10-CM

## 2022-05-13 DIAGNOSIS — R05.3 CHRONIC COUGH: ICD-10-CM

## 2022-05-13 NOTE — TELEPHONE ENCOUNTER
----- Message from Alexa Laurent LPN sent at 5/12/2022  4:29 PM CDT -----  Regarding: nebulizer  Please advise. Thanks!     ----- Message -----  From: Jayla Ramos  Sent: 5/12/2022   4:04 PM CDT  To: Jono Singer Staff    Type: Patient Call Back    Who called: self     What is the request in detail: pt is requesting a nebulizer,  inhalers are too expensive     Can the clinic reply by [facility]?    Would the patient rather a call back or a response via [facility]? Call     Best call back number: [phone] (home)       Additional Information:   [facility][id number] - [place] [address] AT [facility] [address]  [address] [place] [address]  Phone: [phone] Fax: [phone]

## 2022-05-16 ENCOUNTER — HOSPITAL ENCOUNTER (OUTPATIENT)
Dept: RADIOLOGY | Facility: HOSPITAL | Age: 72
Discharge: HOME | End: 2022-05-16
Attending: ORTHOPAEDIC SURGERY
Payer: MEDICARE

## 2022-05-16 ENCOUNTER — OFFICE VISIT (OUTPATIENT)
Dept: ORTHOPEDICS | Facility: CLINIC | Age: 72
End: 2022-05-16
Payer: MEDICARE

## 2022-05-16 VITALS — HEIGHT: 66 IN | WEIGHT: 196 LBS | BODY MASS INDEX: 31.5 KG/M2

## 2022-05-16 DIAGNOSIS — M25.561 RIGHT KNEE PAIN, UNSPECIFIED CHRONICITY: ICD-10-CM

## 2022-05-16 DIAGNOSIS — M17.0 BILATERAL PRIMARY OSTEOARTHRITIS OF KNEE: Primary | ICD-10-CM

## 2022-05-16 PROCEDURE — 99204 OFFICE O/P NEW MOD 45 MIN: CPT | Mod: S$PBB,,, | Performed by: ORTHOPAEDIC SURGERY

## 2022-05-16 PROCEDURE — 73564 X-RAY EXAM KNEE 4 OR MORE: CPT | Mod: TC,50,PN

## 2022-05-16 PROCEDURE — 73564 XR KNEE ORTHO BILAT WITH FLEXION: ICD-10-PCS | Mod: 26,50,, | Performed by: RADIOLOGY

## 2022-05-16 PROCEDURE — 99999 PR PBB SHADOW E&M-EST. PATIENT-LVL II: CPT | Mod: PBBFAC,,, | Performed by: ORTHOPAEDIC SURGERY

## 2022-05-16 PROCEDURE — 99212 OFFICE O/P EST SF 10 MIN: CPT | Mod: PBBFAC,PN | Performed by: ORTHOPAEDIC SURGERY

## 2022-05-16 PROCEDURE — 73564 X-RAY EXAM KNEE 4 OR MORE: CPT | Mod: 26,50,, | Performed by: RADIOLOGY

## 2022-05-16 PROCEDURE — 99999 PR PBB SHADOW E&M-EST. PATIENT-LVL II: ICD-10-PCS | Mod: PBBFAC,,, | Performed by: ORTHOPAEDIC SURGERY

## 2022-05-16 PROCEDURE — 99204 PR OFFICE/OUTPT VISIT, NEW, LEVL IV, 45-59 MIN: ICD-10-PCS | Mod: S$PBB,,, | Performed by: ORTHOPAEDIC SURGERY

## 2022-05-16 RX ORDER — MELOXICAM 15 MG/1
15 TABLET ORAL DAILY
Qty: 30 TABLET | Refills: 11 | Status: SHIPPED | OUTPATIENT
Start: 2022-05-16 | End: 2023-08-30

## 2022-05-16 NOTE — PROGRESS NOTES
CC:  71-year-old female presents for evaluation of bilateral knee pain right worse than left.  The patient has had pain in both of her knees for over 2 years.  She states she occasionally takes an over-the-counter NSAID with some relief.  She has begun to have pain more frequently now on a daily basis this mostly related to going up and down stairs.  She also has some stiffness when she 1st goes to stand after being seated for long period of time.    Past Medical History:   Diagnosis Date    Anxiety     Basal cell carcinoma     Cancer     breast cancer, dx 2015       Past Surgical History:   Procedure Laterality Date    BREAST SURGERY      implants    CHOLECYSTECTOMY      HYSTERECTOMY      MASTECTOMY         Current Outpatient Medications on File Prior to Visit   Medication Sig Dispense Refill    albuterol (PROVENTIL/VENTOLIN HFA) 90 mcg/actuation inhaler Inhale 2 puffs into the lungs every 6 (six) hours as needed for Wheezing. Rescue 3 Inhaler 0    arformoteroL (BROVANA) 15 mcg/2 mL Nebu Take 2 mLs (15 mcg total) by nebulization every 12 (twelve) hours. Controller 75 mL 11    aspirin (ECOTRIN) 81 MG EC tablet Take 81 mg by mouth once daily.      atorvastatin (LIPITOR) 20 MG tablet Take 1 tablet (20 mg total) by mouth once daily. 90 tablet 3    budesonide (PULMICORT) 0.5 mg/2 mL nebulizer solution Take 2 mLs (0.5 mg total) by nebulization 2 (two) times daily. Controller 120 mL 5    diazePAM (VALIUM) 5 MG tablet Bring both tablets to procedure 2 tablet 0    diclofenac (VOLTAREN) 75 MG EC tablet TAKE 1 TABLET BY MOUTH TWICE DAILY 180 tablet 1    FLUoxetine 40 MG capsule TAKE 1 CAPSULE ONCE DAILY 90 capsule 3    fluticasone furoate-vilanteroL (BREO ELLIPTA) 200-25 mcg/dose DsDv diskus inhaler Inhale 1 puff into the lungs once daily. Controller 60 each 11    fluticasone furoate-vilanteroL (BREO ELLIPTA) 200-25 mcg/dose DsDv diskus inhaler Inhale 1 puff into the lungs once daily. Controller 1 each 11     fluticasone propionate (FLONASE) 50 mcg/actuation nasal spray 2 sprays (100 mcg total) by Each Nostril route once daily. 16 g 11    furosemide (LASIX) 20 MG tablet Take 1 tablet (20 mg total) by mouth once daily. for 5 days 5 tablet 0    HYDROcodone-acetaminophen (NORCO) 5-325 mg per tablet Take 1 tablet by mouth every 6 (six) hours as needed for Pain. 16 tablet 0    montelukast (SINGULAIR) 10 mg tablet Take 1 tablet (10 mg total) by mouth every evening. 90 tablet 3    mupirocin (BACTROBAN) 2 % ointment Apply topically 3 (three) times daily. 15 g 0    omeprazole (PRILOSEC) 40 MG capsule TAKE 1 CAPSULE(40 MG) BY MOUTH EVERY DAY 90 capsule 3    predniSONE (DELTASONE) 20 MG tablet Take one pill per day for five days. Repeat as needed for cough. 15 tablet 0     No current facility-administered medications on file prior to visit.       ROS:    Constitution: Denies chills, fever, and sweats.  HENT: Denies headaches or blurry vision.  Cardiovascular: Denies chest pain or irregular heart beat.  Respiratory: Denies cough or shortness of breath.  Gastrointestinal: Denies abdominal pain, nausea, or vomiting.  Genitourinary:  Denies urinary incontinence, bladder and kidney issues  Musculoskeletal:  Denies muscle cramps.  Neurological: Denies dizziness or focal weakness.  Psychiatric/Behavioral: Normal mental status.  Hematologic/Lymphatic: Denies bleeding problem or easy bruising/bleeding.  Skin: Denies rash or suspicious lesions.    Physical examination     Gen - No acute distress, well nourished, well groomed   Eyes - Extraoccular motions intact, pupils equally round and reactive to light and accommodation   ENT - normocephalic, atruamtic, oropharynx clear   Neck - Supple, no abnormal masses   Cardiovascular - regular rate and rhythm   Pulmonary - clear to auscultation bilaterally, no wheezes, ronchi, or rales   Abdomen - soft, non-tender, non-distended, positive bowel sounds   Psych - The patient is alert and oriented  x3 with normal mood and affect    Examination of the Right Lower Extremity:     Skin intact throughout.  Motor function is intact distally EHL/FHL/TA/lolis   +2 dorsalis pedis and posterior tibial pulses   Sensation to light touch intact distally dorsal, plantar, and first web space     Examination of the Right knee:    ROM 0 - 150   Effusion negative  Tenderness to palpation at the joint line positive  Pain during range of motion negative  Crepitation during range of motion negative     negative increased pain noted with flexion past 90   negative antalgic gait noted   negative Lachman's Test   negative Anterior Drawer Test   negative Posterior Drawer Test   negative McMurrays Test   negative Disco Test   negative Varus/Valgus instability    Examination of the Left Lower Extremity:     Skin intact throughout.  Motor function is intact distally EHL/FHL/TA/lolis   +2 dorsalis pedis and posterior tibial pulses   Sensation to light touch intact distally dorsal, plantar, and first web space     Examination of the Left knee:    ROM 0 - 150   Effusion negative  Tenderness to palpation at the joint line positive  Pain during range of motion negative  Crepitation during range of motion negative     negative increased pain noted with flexion past 90   negative antalgic gait noted   negative Lachman's Test   negative Anterior Drawer Test   negative Posterior Drawer Test   negative McMurrays Test   negative Disco Test   negative Varus/Valgus instability    X-ray images were examined and personally interpreted by me.  Three views of bilateral knees dated 05/16/2022 show mild osteoarthritis of both knees with narrowing of the joint space especially in the medial compartment.    Dx:  Bilateral knee osteoarthritis    Plan:  Recommendations start a once a day arthritis medication.  We send in a prescription for meloxicam and I will have her follow up in 1 month for re-evaluation.

## 2022-05-31 ENCOUNTER — PATIENT MESSAGE (OUTPATIENT)
Dept: ADMINISTRATIVE | Facility: HOSPITAL | Age: 72
End: 2022-05-31
Payer: MEDICARE

## 2022-06-13 DIAGNOSIS — J45.21 MILD INTERMITTENT ASTHMA WITH ACUTE EXACERBATION: ICD-10-CM

## 2022-06-13 DIAGNOSIS — B37.0 ORAL THRUSH: Primary | ICD-10-CM

## 2022-06-13 RX ORDER — ARFORMOTEROL TARTRATE 15 UG/2ML
15 SOLUTION RESPIRATORY (INHALATION) EVERY 12 HOURS
Qty: 75 ML | Refills: 11 | Status: SHIPPED | OUTPATIENT
Start: 2022-06-13 | End: 2023-08-30

## 2022-06-13 RX ORDER — BUDESONIDE 0.5 MG/2ML
0.5 INHALANT ORAL 2 TIMES DAILY
Qty: 120 ML | Refills: 5 | Status: SHIPPED | OUTPATIENT
Start: 2022-06-13 | End: 2023-08-30

## 2022-06-13 RX ORDER — NYSTATIN 100000 [USP'U]/ML
4 SUSPENSION ORAL 4 TIMES DAILY PRN
Qty: 160 ML | Refills: 0 | Status: SHIPPED | OUTPATIENT
Start: 2022-06-13 | End: 2022-06-23

## 2022-06-13 NOTE — TELEPHONE ENCOUNTER
Sent rx to [facility] will send to boni as soon as she signs .   ----- Message from Mega Pierce sent at 6/13/2022  8:40 AM CDT -----  Contact: pt at [phone]  Type: Needs Medical Advice  Who Called:  pt    Best Call Back Number: [phone]    Additional Information: pt is calling the office stating she is still waiting on her neubilizer and she has thrush and need something for that as well. Please call back and advise.

## 2022-06-14 ENCOUNTER — OFFICE VISIT (OUTPATIENT)
Dept: ORTHOPEDICS | Facility: CLINIC | Age: 72
End: 2022-06-14
Payer: MEDICARE

## 2022-06-14 VITALS — HEIGHT: 66 IN | RESPIRATION RATE: 18 BRPM | BODY MASS INDEX: 31.34 KG/M2 | WEIGHT: 195 LBS

## 2022-06-14 DIAGNOSIS — M17.0 BILATERAL PRIMARY OSTEOARTHRITIS OF KNEE: Primary | ICD-10-CM

## 2022-06-14 PROCEDURE — 99213 OFFICE O/P EST LOW 20 MIN: CPT | Mod: PBBFAC,PN | Performed by: ORTHOPAEDIC SURGERY

## 2022-06-14 PROCEDURE — 99212 OFFICE O/P EST SF 10 MIN: CPT | Mod: S$PBB,,, | Performed by: ORTHOPAEDIC SURGERY

## 2022-06-14 PROCEDURE — 99212 PR OFFICE/OUTPT VISIT, EST, LEVL II, 10-19 MIN: ICD-10-PCS | Mod: S$PBB,,, | Performed by: ORTHOPAEDIC SURGERY

## 2022-06-14 PROCEDURE — 99999 PR PBB SHADOW E&M-EST. PATIENT-LVL III: CPT | Mod: PBBFAC,,, | Performed by: ORTHOPAEDIC SURGERY

## 2022-06-14 PROCEDURE — 99999 PR PBB SHADOW E&M-EST. PATIENT-LVL III: ICD-10-PCS | Mod: PBBFAC,,, | Performed by: ORTHOPAEDIC SURGERY

## 2022-06-14 NOTE — PROGRESS NOTES
CC:  71-year-old female follows up with bilateral knee pain.  The patient has some osteoarthritis of her knees and on her last visit we started her on some meloxicam.  She has gotten good relief with that and has no complaints today.    Examination of the Right Lower Extremity:     Skin intact throughout.  Motor function is intact distally EHL/FHL/TA/lolis   +2 dorsalis pedis and posterior tibial pulses   Sensation to light touch intact distally dorsal, plantar, and first web space     Examination of the Right knee:    ROM 0 - 150   Effusion negative  Tenderness to palpation at the joint line negative  Pain during range of motion negative  Crepitation during range of motion negative     negative increased pain noted with flexion past 90   negative antalgic gait noted   negative Lachman's Test   negative Anterior Drawer Test   negative Posterior Drawer Test   negative McMurrays Test   negative Disco Test   negative Varus/Valgus instability      Examination of the Left Lower Extremity:     Skin intact throughout.  Motor function is intact distally EHL/FHL/TA/lolis   +2 dorsalis pedis and posterior tibial pulses   Sensation to light touch intact distally dorsal, plantar, and first web space     Examination of the Left knee:    ROM 0 - 150   Effusion negative  Tenderness to palpation at the joint line negative  Pain during range of motion negative  Crepitation during range of motion negative     negative increased pain noted with flexion past 90   negative antalgic gait noted   negative Lachman's Test   negative Anterior Drawer Test   negative Posterior Drawer Test   negative McMurrays Test   negative Disco Test   negative Varus/Valgus instability    Dx:  Osteoarthritis bilateral knees improved with p.o. medication    Plan:  Patient can continue taking the Mobic as needed.  She can follow up p.r.n..

## 2022-06-16 ENCOUNTER — TELEPHONE (OUTPATIENT)
Dept: PULMONOLOGY | Facility: CLINIC | Age: 72
End: 2022-06-16
Payer: MEDICARE

## 2022-07-12 ENCOUNTER — TELEPHONE (OUTPATIENT)
Dept: PULMONOLOGY | Facility: CLINIC | Age: 72
End: 2022-07-12
Payer: MEDICARE

## 2022-11-13 ENCOUNTER — PATIENT MESSAGE (OUTPATIENT)
Dept: FAMILY MEDICINE | Facility: CLINIC | Age: 72
End: 2022-11-13
Payer: MEDICARE

## 2023-01-05 ENCOUNTER — PATIENT MESSAGE (OUTPATIENT)
Dept: ADMINISTRATIVE | Facility: HOSPITAL | Age: 73
End: 2023-01-05
Payer: MEDICARE

## 2023-02-22 ENCOUNTER — PATIENT OUTREACH (OUTPATIENT)
Dept: ADMINISTRATIVE | Facility: HOSPITAL | Age: 73
End: 2023-02-22
Payer: MEDICARE

## 2023-02-22 NOTE — LETTER
February 22, 2023    Zuly SUNNY Person  [address] [place] [address]             [facility]  [address]  [place] [address]  Phone: [phone] Zuly SUNNY Person    [facility] provider, Lucrecia De La Cruz DO is no longer practicing as full time primary care provider at [facility]. When she returns from leave she will be providing urgent care services periodically for the time being. We are thankful for the excellent care she has provided to her patients and our community.    To make the transition to a new provider as seamless as possible, we encourage you to continue your care with [facility]. To ensure the continuity of your care, Lucrecia De La Cruz patients will be seamlessly transitioned to one of several Family Medicine providers within our group practice.    Listed below are providers we would like you to consider as you resume your care with [facility]. Information about the unique qualifications and special areas of interest held by our physicians can be found at [email or web address].     Providers:         Location:        Cyrus Yepez MD       [facility]     MD Imelda Sears MD Nicole Lafontaine, NP is not paneling patients             We are confident that you will continue to receive the excellent treatment and service to which you are accustomed. As always, your medical records are available electronically to any of our providers, ensuring no disruption in your care.  If you need an appointment, please call [phone] to schedule with the team. You can also schedule appointments online at [email or web address].    We also have [facility] clincs in:    [facility], [place]  [place], [place]  [place], [place]  [place], [place]  [place], [place]    We value the trust you have placed in [facility] in  allowing us the privilege of caring for you and your familys medical needs.      If you decided not to continue to use [facility] for your Primary Care needs, please call [phone] to let us know so you can be removed from any Outreach list.     Sincerely,    [facility]    Emily Martinez LPN  Performance Improvement Coordinator  [facility] [address] [place] [address] [phone]

## 2023-02-22 NOTE — PROGRESS NOTES
Outreached pt about Dr De La Cruz no longer seeing patients as a PCP in [facility], and that an establish care appointment needed for a new Primary Care Provider. 3rd Attempt to reach pt by phone, no answer, left VM, sending portal/letter outreach at this time, updated care team at this time.

## 2023-03-16 ENCOUNTER — OFFICE VISIT (OUTPATIENT)
Dept: ORTHOPEDICS | Facility: CLINIC | Age: 73
End: 2023-03-16
Payer: MEDICARE

## 2023-03-16 VITALS — HEIGHT: 66 IN | WEIGHT: 195.13 LBS | BODY MASS INDEX: 31.36 KG/M2

## 2023-03-16 DIAGNOSIS — M23.203 DEGENERATIVE TEAR OF MEDIAL MENISCUS, RIGHT: Primary | ICD-10-CM

## 2023-03-16 PROCEDURE — 20610 DRAIN/INJ JOINT/BURSA W/O US: CPT | Mod: PBBFAC,PN,RT | Performed by: ORTHOPAEDIC SURGERY

## 2023-03-16 PROCEDURE — 20610 LARGE JOINT ASPIRATION/INJECTION: R KNEE: ICD-10-PCS | Mod: S$PBB,RT,, | Performed by: ORTHOPAEDIC SURGERY

## 2023-03-16 PROCEDURE — 99213 OFFICE O/P EST LOW 20 MIN: CPT | Mod: PBBFAC,PN,25 | Performed by: ORTHOPAEDIC SURGERY

## 2023-03-16 PROCEDURE — 99999 PR PBB SHADOW E&M-EST. PATIENT-LVL III: ICD-10-PCS | Mod: PBBFAC,,, | Performed by: ORTHOPAEDIC SURGERY

## 2023-03-16 PROCEDURE — 99999 PR PBB SHADOW E&M-EST. PATIENT-LVL III: CPT | Mod: PBBFAC,,, | Performed by: ORTHOPAEDIC SURGERY

## 2023-03-16 PROCEDURE — 99214 OFFICE O/P EST MOD 30 MIN: CPT | Mod: S$PBB,25,, | Performed by: ORTHOPAEDIC SURGERY

## 2023-03-16 PROCEDURE — 99214 PR OFFICE/OUTPT VISIT, EST, LEVL IV, 30-39 MIN: ICD-10-PCS | Mod: S$PBB,25,, | Performed by: ORTHOPAEDIC SURGERY

## 2023-03-16 RX ORDER — METHYLPREDNISOLONE ACETATE 80 MG/ML
80 INJECTION, SUSPENSION INTRA-ARTICULAR; INTRALESIONAL; INTRAMUSCULAR; SOFT TISSUE
Status: DISCONTINUED | OUTPATIENT
Start: 2023-03-16 | End: 2023-03-16 | Stop reason: HOSPADM

## 2023-03-16 RX ADMIN — METHYLPREDNISOLONE ACETATE 80 MG: 80 INJECTION, SUSPENSION INTRA-ARTICULAR; INTRALESIONAL; INTRAMUSCULAR; SOFT TISSUE at 10:03

## 2023-03-16 NOTE — PROGRESS NOTES
CC:  72-year-old female presents for evaluation of intermittent pain and feelings of instability in the right knee.  It is going on about 3 weeks.  The patient plays pickleball and she played a few matches recently and states she feels like she over did it with her knee.  Since then she is had intermittent issues where she will we walking and turning pivot and have a sudden sharp pain in the knee with a feeling of wanting to buckle and give way.  Other than those intermittent episode she is not really having any pain.  She is been using a brace that has helped.    Past Medical History:   Diagnosis Date    Anxiety     Basal cell carcinoma     Cancer     breast cancer, dx 2015       Past Surgical History:   Procedure Laterality Date    BREAST SURGERY      implants    CHOLECYSTECTOMY      HYSTERECTOMY      MASTECTOMY         Current Outpatient Medications on File Prior to Visit   Medication Sig Dispense Refill    albuterol (PROVENTIL/VENTOLIN HFA) 90 mcg/actuation inhaler Inhale 2 puffs into the lungs every 6 (six) hours as needed for Wheezing. Rescue 3 Inhaler 0    arformoteroL (BROVANA) 15 mcg/2 mL Nebu Take 2 mLs (15 mcg total) by nebulization every 12 (twelve) hours. Controller 75 mL 11    aspirin (ECOTRIN) 81 MG EC tablet Take 81 mg by mouth once daily.      atorvastatin (LIPITOR) 20 MG tablet Take 1 tablet (20 mg total) by mouth once daily. 90 tablet 3    budesonide (PULMICORT) 0.5 mg/2 mL nebulizer solution Take 2 mLs (0.5 mg total) by nebulization 2 (two) times daily. Controller 120 mL 5    diazePAM (VALIUM) 5 MG tablet Bring both tablets to procedure 2 tablet 0    diclofenac (VOLTAREN) 75 MG EC tablet TAKE 1 TABLET BY MOUTH TWICE DAILY 180 tablet 1    FLUoxetine 40 MG capsule TAKE 1 CAPSULE ONCE DAILY 90 capsule 3    fluticasone furoate-vilanteroL (BREO ELLIPTA) 200-25 mcg/dose DsDv diskus inhaler Inhale 1 puff into the lungs once daily. Controller 60 each 11    fluticasone furoate-vilanteroL (BREO ELLIPTA)  200-25 mcg/dose DsDv diskus inhaler Inhale 1 puff into the lungs once daily. Controller 1 each 11    fluticasone propionate (FLONASE) 50 mcg/actuation nasal spray 2 sprays (100 mcg total) by Each Nostril route once daily. 16 g 11    HYDROcodone-acetaminophen (NORCO) 5-325 mg per tablet Take 1 tablet by mouth every 6 (six) hours as needed for Pain. 16 tablet 0    meloxicam (MOBIC) 15 MG tablet Take 1 tablet (15 mg total) by mouth once daily. 30 tablet 11    montelukast (SINGULAIR) 10 mg tablet Take 1 tablet (10 mg total) by mouth every evening. 90 tablet 3    mupirocin (BACTROBAN) 2 % ointment Apply topically 3 (three) times daily. 15 g 0    omeprazole (PRILOSEC) 40 MG capsule TAKE 1 CAPSULE(40 MG) BY MOUTH EVERY DAY 90 capsule 3    predniSONE (DELTASONE) 20 MG tablet Take one pill per day for five days. Repeat as needed for cough. 15 tablet 0    furosemide (LASIX) 20 MG tablet Take 1 tablet (20 mg total) by mouth once daily. for 5 days 5 tablet 0     No current facility-administered medications on file prior to visit.       ROS:    Constitution: Denies chills, fever, and sweats.  HENT: Denies headaches or blurry vision.  Cardiovascular: Denies chest pain or irregular heart beat.  Respiratory: Denies cough or shortness of breath.  Gastrointestinal: Denies abdominal pain, nausea, or vomiting.  Genitourinary:  Denies urinary incontinence, bladder and kidney issues  Musculoskeletal:  Denies muscle cramps.  Neurological: Denies dizziness or focal weakness.  Psychiatric/Behavioral: Normal mental status.  Hematologic/Lymphatic: Denies bleeding problem or easy bruising/bleeding.  Skin: Denies rash or suspicious lesions.    Physical examination     Gen - No acute distress, well nourished, well groomed   Eyes - Extraoccular motions intact, pupils equally round and reactive to light and accommodation   ENT - normocephalic, atruamtic, oropharynx clear   Neck - Supple, no abnormal masses   Cardiovascular - regular rate and  rhythm   Pulmonary - clear to auscultation bilaterally, no wheezes, ronchi, or rales   Abdomen - soft, non-tender, non-distended, positive bowel sounds   Psych - The patient is alert and oriented x3 with normal mood and affect    Examination of the Right Lower Extremity:     Skin intact throughout.  Motor function is intact distally EHL/FHL/TA/lolis   +2 dorsalis pedis and posterior tibial pulses   Sensation to light touch intact distally dorsal, plantar, and first web space     Examination of the Right knee:    ROM 0 - 150   Effusion positive  Tenderness to palpation at the joint line positive  Pain during range of motion negative  Crepitation during range of motion negative     negative increased pain noted with flexion past 90   negative antalgic gait noted   negative Lachman's Test   negative Anterior Drawer Test   negative Posterior Drawer Test   positive McMurrays Test   negative Disco Test   negative Varus/Valgus instability    X-ray images were examined and personally interpreted by me.  Three views of both knees dated 05/16/2022 were once again reviewed.  The patient has some mild narrowing of the joint space and early subchondral sclerosis indicative of mild arthritis but otherwise normal exam.    Dx:  Likely degenerative tear of the medial meniscus of the right knee    Plan:  We would a long discussion about treatment options.  Her symptoms are really intermittent at this time in the brace is helping.  I did offer her an injection today and she agreed.  We injected the right knee with a mixture of 2, 2, 1.  We also discussed using over-the-counter NSAIDs for pain and inflammation.  We also discussed icing at night.  I did inspect her brace is while she was here in the appear appropriate so she will continue bracing as well.  Follow up p.r.n..

## 2023-03-16 NOTE — PROCEDURES
Large Joint Aspiration/Injection: R knee    Date/Time: 3/16/2023 10:45 AM  Performed by: Pierce Myers II, MD  Authorized by: Pierce Myers II, MD     Consent Done?:  Yes (Verbal)  Indications:  Pain and arthritis  Timeout: prior to procedure the correct patient, procedure, and site was verified      Local anesthesia used?: Yes    Local anesthetic:  Topical anesthetic    Details:  Needle Size:  22 G  Approach:  Anterolateral  Location:  Knee  Site:  R knee  Medications:  80 mg methylPREDNISolone acetate 80 mg/mL  Patient tolerance:  Patient tolerated the procedure well with no immediate complications

## 2023-08-30 ENCOUNTER — OFFICE VISIT (OUTPATIENT)
Dept: FAMILY MEDICINE | Facility: CLINIC | Age: 73
End: 2023-08-30
Payer: MEDICARE

## 2023-08-30 VITALS
OXYGEN SATURATION: 95 % | WEIGHT: 199.38 LBS | HEIGHT: 66 IN | DIASTOLIC BLOOD PRESSURE: 80 MMHG | HEART RATE: 70 BPM | SYSTOLIC BLOOD PRESSURE: 130 MMHG | BODY MASS INDEX: 32.04 KG/M2

## 2023-08-30 DIAGNOSIS — E04.2 NONTOXIC MULTINODULAR GOITER: ICD-10-CM

## 2023-08-30 DIAGNOSIS — Z13.6 ENCOUNTER FOR LIPID SCREENING FOR CARDIOVASCULAR DISEASE: ICD-10-CM

## 2023-08-30 DIAGNOSIS — Z82.49 FAMILY HISTORY OF HEART DISEASE: ICD-10-CM

## 2023-08-30 DIAGNOSIS — F33.1 MAJOR DEPRESSIVE DISORDER, RECURRENT, MODERATE: ICD-10-CM

## 2023-08-30 DIAGNOSIS — Z00.00 ANNUAL PHYSICAL EXAM: Primary | ICD-10-CM

## 2023-08-30 DIAGNOSIS — Z51.81 ENCOUNTER FOR MEDICATION MONITORING: ICD-10-CM

## 2023-08-30 DIAGNOSIS — Z13.220 ENCOUNTER FOR LIPID SCREENING FOR CARDIOVASCULAR DISEASE: ICD-10-CM

## 2023-08-30 DIAGNOSIS — Z85.3 HISTORY OF BREAST CANCER: ICD-10-CM

## 2023-08-30 DIAGNOSIS — K21.9 GASTROESOPHAGEAL REFLUX DISEASE, UNSPECIFIED WHETHER ESOPHAGITIS PRESENT: ICD-10-CM

## 2023-08-30 DIAGNOSIS — R11.10 REGURGITATION OF FOOD: ICD-10-CM

## 2023-08-30 DIAGNOSIS — M85.89 OSTEOPENIA OF MULTIPLE SITES: ICD-10-CM

## 2023-08-30 PROCEDURE — 99214 OFFICE O/P EST MOD 30 MIN: CPT | Mod: PBBFAC | Performed by: FAMILY MEDICINE

## 2023-08-30 PROCEDURE — 99999 PR PBB SHADOW E&M-EST. PATIENT-LVL IV: CPT | Mod: PBBFAC,,, | Performed by: FAMILY MEDICINE

## 2023-08-30 PROCEDURE — 99397 PR PREVENTIVE VISIT,EST,65 & OVER: ICD-10-PCS | Mod: S$PBB,GZ,, | Performed by: FAMILY MEDICINE

## 2023-08-30 PROCEDURE — 99999 PR PBB SHADOW E&M-EST. PATIENT-LVL IV: ICD-10-PCS | Mod: PBBFAC,,, | Performed by: FAMILY MEDICINE

## 2023-08-30 PROCEDURE — 99397 PER PM REEVAL EST PAT 65+ YR: CPT | Mod: S$PBB,GZ,, | Performed by: FAMILY MEDICINE

## 2023-08-30 RX ORDER — MINOCYCLINE HYDROCHLORIDE 50 MG/1
50 CAPSULE ORAL
COMMUNITY
Start: 2023-01-24 | End: 2023-08-30

## 2023-08-30 NOTE — PROGRESS NOTES
"[facility] - Clinic Note    Subjective      Ms. Person is a 72 y.o. female who presents to clinic for an annual.     New to me.    H/o depression- on prozac stable      H/o GERD: on prilosec. Would like a referral to GI. Has been bringing up food. Denies dysphagia.       PMH Zuly has a past medical history of Anxiety, Basal cell carcinoma, and Cancer.   PSXH Zuly has a past surgical history that includes Cholecystectomy; Hysterectomy; Breast surgery; and Mastectomy.    Zuly's family history includes Melanoma in her child.   SH Zuly reports that she quit smoking about 27 years ago. Her smoking use included cigarettes. She started smoking about 42 years ago. She has a 15.0 pack-year smoking history. She has never used smokeless tobacco. She reports current alcohol use. She reports that she does not use drugs.   ALG Zuly has No Known Allergies.   MED Zuly has a current medication list which includes the following prescription(s): fluoxetine, omeprazole, fluticasone propionate, and montelukast.     Review of Systems   Constitutional:  Negative for activity change, appetite change, chills, fatigue and fever.   Eyes:  Negative for visual disturbance.   Respiratory:  Negative for cough and shortness of breath.    Cardiovascular:  Negative for chest pain, palpitations and leg swelling.   Gastrointestinal:  Negative for abdominal pain, nausea and vomiting.   Skin:  Negative for wound.   Neurological:  Negative for dizziness and headaches.   Psychiatric/Behavioral:  Negative for confusion.      Objective     /80 (BP Location: Left arm, Patient Position: Sitting, BP Method: Large (Manual))   Pulse 70   Ht 5' 6" (1.676 m)   Wt 90.4 kg (199 lb 6.4 oz)   LMP  (LMP Unknown)   SpO2 95%   BMI 32.18 kg/m²     Physical Exam   Constitutional: normal appearance. She appears well-developed, well-nourished and obese.  Non-toxic appearance. No distress. She does not appear ill.   HENT:   Head: Normocephalic and " atraumatic.   Eyes: Right eye exhibits no discharge. Left eye exhibits no discharge.   Cardiovascular: Normal rate, regular rhythm, normal heart sounds and normal pulses. Exam reveals no gallop and no friction rub.   No murmur heard.Pulmonary:      Effort: Pulmonary effort is normal. No respiratory distress.      Breath sounds: Normal breath sounds. No wheezing, rhonchi or rales.     Abdominal: Normal appearance.   Musculoskeletal:      Cervical back: Neck supple.   Lymphadenopathy:     She has no cervical adenopathy.   Neurological: She is alert.   Skin: Skin is warm and dry. Capillary refill takes less than 2 seconds. She is not diaphoretic.   Psychiatric: Her behavior is normal. Mood, judgment and thought content normal.   Vitals reviewed.     Assessment/Plan     Zuly was seen today for annual exam.    Diagnoses and all orders for this visit:  -New patient and new problem to me    Annual physical exam    Encounter for lipid screening for cardiovascular disease  -     Lipid panel; Future    Family history of heart disease  -     Ambulatory referral/consult to Cardiology; Future    Gastroesophageal reflux disease, unspecified whether esophagitis present  -     Ambulatory referral/consult to Gastroenterology; Future    Regurgitation of food  -     Ambulatory referral/consult to Gastroenterology; Future    Osteopenia of multiple sites  -     DXA Bone Density Axial Skeleton 1 or more sites; Future    Nontoxic multinodular goiter  -     TSH; Future    History of breast cancer    Major depressive disorder, recurrent, moderate  -stable on prozac. Continue current med.     Encounter for medication monitoring  -     CBC auto differential; Future  -     Comprehensive metabolic panel; Future        Future Appointments   Date Time Provider Department Center   9/18/2023 10:30 AM Slime Olivas NP [facility]   10/30/2023  1:40 PM [facility]       MD Angel Paris  Medicine  [facility]

## 2023-09-01 ENCOUNTER — HOSPITAL ENCOUNTER (OUTPATIENT)
Dept: RADIOLOGY | Facility: HOSPITAL | Age: 73
Discharge: HOME | End: 2023-09-01
Attending: FAMILY MEDICINE
Payer: MEDICARE

## 2023-09-01 DIAGNOSIS — M85.89 OSTEOPENIA OF MULTIPLE SITES: ICD-10-CM

## 2023-09-01 PROCEDURE — 77080 DXA BONE DENSITY AXIAL: CPT | Mod: 26,,, | Performed by: RADIOLOGY

## 2023-09-01 PROCEDURE — 77080 DXA BONE DENSITY AXIAL SKELETON 1 OR MORE SITES: ICD-10-PCS | Mod: 26,,, | Performed by: RADIOLOGY

## 2023-09-01 PROCEDURE — 77080 DXA BONE DENSITY AXIAL: CPT | Mod: TC

## 2023-09-12 DIAGNOSIS — D72.819 LEUKOPENIA, UNSPECIFIED TYPE: Primary | ICD-10-CM

## 2023-09-12 DIAGNOSIS — R05.3 CHRONIC COUGH: ICD-10-CM

## 2023-09-12 DIAGNOSIS — K21.9 GASTROESOPHAGEAL REFLUX DISEASE: ICD-10-CM

## 2023-09-12 DIAGNOSIS — R73.9 HYPERGLYCEMIA: ICD-10-CM

## 2023-09-12 DIAGNOSIS — Z76.0 MEDICATION REFILL: ICD-10-CM

## 2023-09-12 DIAGNOSIS — J45.20 MILD INTERMITTENT ASTHMA WITHOUT COMPLICATION: ICD-10-CM

## 2023-09-12 RX ORDER — FLUTICASONE PROPIONATE 50 MCG
2 SPRAY, SUSPENSION (ML) NASAL DAILY
Qty: 16 G | Refills: 11 | Status: SHIPPED | OUTPATIENT
Start: 2023-09-12

## 2023-09-13 RX ORDER — OMEPRAZOLE 40 MG/1
40 CAPSULE, DELAYED RELEASE ORAL DAILY
Qty: 90 CAPSULE | Refills: 3 | Status: SHIPPED | OUTPATIENT
Start: 2023-09-13

## 2023-09-13 RX ORDER — MONTELUKAST SODIUM 10 MG/1
10 TABLET ORAL NIGHTLY
Qty: 90 TABLET | Refills: 3 | Status: SHIPPED | OUTPATIENT
Start: 2023-09-13

## 2023-09-13 RX ORDER — FLUOXETINE HYDROCHLORIDE 40 MG/1
40 CAPSULE ORAL DAILY
Qty: 90 CAPSULE | Refills: 3 | Status: SHIPPED | OUTPATIENT
Start: 2023-09-13

## 2023-09-15 NOTE — PROGRESS NOTES
Subjective:       Patient ID: Zuly Person is a 73 y.o. female Body mass index is 32.1 kg/m².    Chief Complaint: Gastroesophageal Reflux    This patient is new to me.  Referring Provider: Dr. Raad Ya for Dysphagia.             Gastroesophageal Reflux  She complains of belching, coughing (chronic cough hx asthma sees pulm) and dysphagia (chronic, occurs once a month, when eating rice only, has difficulty with pills, no liquids, vomits rice up looks like a bolus of rice). She reports no abdominal pain, no chest pain (fullness in chest area releaved sit up if dotn sit up, will see cardiology next), no choking, no early satiety, no globus sensation, no heartburn, no hoarse voice, no nausea or no water brash. This is a chronic problem. The problem has been unchanged. The symptoms are aggravated by certain foods and caffeine. Pertinent negatives include no anemia, fatigue, melena, muscle weakness or weight loss. She has tried a PPI (has been on prilosec for 20 years) for the symptoms. The treatment provided moderate relief. Past procedures include an EGD (last EGD about 20 years ago). Past procedures do not include an abdominal ultrasound, esophageal manometry, esophageal pH monitoring, H. pylori antibody titer or a UGI. Past invasive treatments do not include gastroplasty, gastroplication or reflux surgery.   GI Problem  The primary symptoms include diarrhea (states has daily BMs rates stool type 3-4 on [place] stools,denies straining, feels empty, states about once a week will have 1episode of explosive diarrhea [id number] stool scale does not pinpoint any food that causes explosive diarrhea,will start diary). Primary symptoms do not include fever, weight loss, fatigue, abdominal pain, nausea, vomiting, melena, hematemesis, jaundice, hematochezia or dysuria.   The illness is also significant for dysphagia (chronic, occurs once a month, when eating rice only, has difficulty with pills, no liquids, vomits  rice up looks like a bolus of rice). The illness does not include bloating or constipation. Associated symptoms comments: Last colonoscopy 6/18/19 in [facility] Per Dr. Lofton   External/internal hemorrhoids, had polyps 3mm-5mm in rectum  -recommend repeat colonoscopy 5-10 years  -patient states has family history of maternal grandmother of colon cancer denies personal history of colon cancer. Significant associated medical issues include GERD and alcohol abuse (pt states drinks 3 bourbon and cokes almost daily). Associated medical issues do not include inflammatory bowel disease, gallstones, liver disease, PUD, gastric bypass, bowel resection, irritable bowel syndrome, hemorrhoids or diverticulitis.       Review of Systems   Constitutional:  Negative for fatigue, fever and weight loss.   HENT:  Negative for hoarse voice.    Respiratory:  Positive for cough (chronic cough hx asthma sees pulm). Negative for choking.    Cardiovascular:  Negative for chest pain (fullness in chest area releaved sit up if dotn sit up, will see cardiology next).   Gastrointestinal:  Positive for diarrhea (states has daily BMs rates stool type 3-4 on [place] stools,denies straining, feels empty, states about once a week will have 1episode of explosive diarrhea [id number] stool scale does not pinpoint any food that causes explosive diarrhea,will start diary) and dysphagia (chronic, occurs once a month, when eating rice only, has difficulty with pills, no liquids, vomits rice up looks like a bolus of rice). Negative for abdominal distention, abdominal pain, anal bleeding, bloating, blood in stool, constipation, heartburn, hematemesis, hematochezia, jaundice, melena, nausea, rectal pain and vomiting.   Genitourinary:  Negative for dysuria.   Musculoskeletal:  Negative for muscle weakness.         No LMP recorded (lmp unknown). Patient has had a hysterectomy.  Past Medical History:   Diagnosis Date    Anxiety     Basal cell carcinoma      Cancer     breast cancer, dx 2015    Colon polyp     GERD (gastroesophageal reflux disease)      Past Surgical History:   Procedure Laterality Date    APPENDECTOMY      BREAST SURGERY      implants    CHOLECYSTECTOMY      COLONOSCOPY      HYSTERECTOMY      MASTECTOMY      UPPER GASTROINTESTINAL ENDOSCOPY      ABOUT 20 YEARS AGO     Family History   Problem Relation Age of Onset    Liver cancer Paternal Uncle     Colon cancer Maternal Grandmother     Melanoma Child     Esophageal cancer Neg Hx     Crohn's disease Neg Hx     Rectal cancer Neg Hx     Stomach cancer Neg Hx     Ulcerative colitis Neg Hx      Social History     Tobacco Use    Smoking status: Former     Current packs/day: 0.00     Average packs/day: 1 pack/day for 15.0 years (15.0 ttl pk-yrs)     Types: Cigarettes     Start date:      Quit date:      Years since quittin.7    Smokeless tobacco: Never   Substance Use Topics    Alcohol use: Yes     Comment: daily bourbon coke 2-3 cups    Drug use: No     Wt Readings from Last 10 Encounters:   23 90.2 kg (198 lb 13.7 oz)   23 90.4 kg (199 lb 6.4 oz)   23 88.5 kg (195 lb 1.7 oz)   22 88.5 kg (195 lb)   22 88.9 kg (195 lb 15.8 oz)   22 88.9 kg (195 lb 14.1 oz)   10/17/21 87.1 kg (192 lb)   10/26/20 90.3 kg (199 lb)   10/22/20 90.3 kg (199 lb)   20 87.1 kg (192 lb)     Lab Results   Component Value Date    WBC 3.42 (L) 2023    HGB 14.2 2023    HCT 41.8 2023    MCV 92 2023     (L) 2023     CMP  Sodium   Date Value Ref Range Status   2023 142 136 - 145 mmol/L Final     Potassium   Date Value Ref Range Status   2023 4.0 3.5 - 5.1 mmol/L Final     Chloride   Date Value Ref Range Status   2023 105 95 - 110 mmol/L Final     CO2   Date Value Ref Range Status   2023 28 23 - 29 mmol/L Final     Glucose   Date Value Ref Range Status   2023 114 (H) 70 - 110 mg/dL Final     BUN   Date Value Ref Range  "Status   08/31/2023 7 (L) 8 - 23 mg/dL Final     Creatinine   Date Value Ref Range Status   08/31/2023 0.7 0.5 - 1.4 mg/dL Final     Calcium   Date Value Ref Range Status   08/31/2023 9.0 8.7 - 10.5 mg/dL Final     Total Protein   Date Value Ref Range Status   08/31/2023 6.5 6.0 - 8.4 g/dL Final     Albumin   Date Value Ref Range Status   08/31/2023 3.5 3.5 - 5.2 g/dL Final     Total Bilirubin   Date Value Ref Range Status   08/31/2023 0.6 0.1 - 1.0 mg/dL Final     Comment:     For infants and newborns, interpretation of results should be based  on gestational age, weight and in agreement with clinical  observations.    Premature Infant recommended reference ranges:  Up to 24 hours.............<8.0 mg/dL  Up to 48 hours............<12.0 mg/dL  3-5 days..................<15.0 mg/dL  6-29 days.................<15.0 mg/dL       Alkaline Phosphatase   Date Value Ref Range Status   08/31/2023 79 55 - 135 U/L Final     AST   Date Value Ref Range Status   08/31/2023 16 10 - 40 U/L Final     ALT   Date Value Ref Range Status   08/31/2023 11 10 - 44 U/L Final     Anion Gap   Date Value Ref Range Status   08/31/2023 9 8 - 16 mmol/L Final     eGFR if    Date Value Ref Range Status   03/12/2021 >60.0 >60 mL/min/1.73 m^2 Final     eGFR if non    Date Value Ref Range Status   03/12/2021 >60.0 >60 mL/min/1.73 m^2 Final     Comment:     Calculation used to obtain the estimated glomerular filtration  rate (eGFR) is the CKD-EPI equation.        No results found for: "LIPASE"  No results found for: "LIPASERES"  Lab Results   Component Value Date    TSH 1.317 08/31/2023       Reviewed prior medical records including office visit Dr. Ya 8/30/23,  all endoscopy history reviewed (see surgical history/procedures).    Objective:      Physical Exam  Vitals and nursing note reviewed.   Constitutional:       Appearance: Normal appearance. She is normal weight.   Cardiovascular:      Rate and Rhythm: " Normal rate and regular rhythm.      Heart sounds: Normal heart sounds.   Pulmonary:      Breath sounds: Normal breath sounds.   Abdominal:      General: Bowel sounds are normal.      Palpations: Abdomen is soft.      Tenderness: There is no abdominal tenderness.   Skin:     General: Skin is warm and dry.      Coloration: Skin is not jaundiced.   Neurological:      Mental Status: She is alert and oriented to person, place, and time.   Psychiatric:         Mood and Affect: Mood normal.         Behavior: Behavior normal.         Assessment:       1. Dysphagia, unspecified type    2. Gastroesophageal reflux disease, unspecified whether esophagitis present    3. Belching    4. Encounter for monitoring long-term proton pump inhibitor therapy    5. History of colon polyps    6. Intermittent diarrhea    7. Alcohol abuse    8. Chronic cough        Plan:       Dysphagia, unspecified type  -     Case Request Endoscopy: EGD  - schedule EGD, discussed procedure with patient and possible esophageal dilation may be performed during procedure if indicated, patient verbalized understanding  - educated patient to eat smaller more frequent meals and to eat slowly and advised to eat a soft diet.  - possible UGI/esophagram/esophageal manometry if symptoms persist    Pill dysphagia  -Take a few sips of a drink before you try to swallow a pill. This gets your mouth and throat moist and helps keep pills from getting stuck. Drink after you take your pills too, to help keep them from getting stuck in your throat  -Crush a pill or break it in smaller pieces. Use a pill cutter and then swallow the small pieces one at a time. Never crush or break pills unless the doctor or pharmacist says it is safe to do so.  -Place a capsule or pill in jelly, pudding, or applesauce and swallow it whole. Always check with your doctor or pharmacist before you mix your medicines with food.  -Crush a pill or open a capsule and mix with applesauce or  pudding.   -consider esophagram with barium tablet    Gastroesophageal reflux disease, unspecified whether esophagitis present, Belching  -   continue taking omeprazole Prilosec 40 mg p.o. daily  -Take PPI 30min-1hr before eating breakfast  -Educated patient on lifestyle modifications to help control/reduce reflux/abdominal pain including: avoid large meals, avoid eating within 2-3 hours of bedtime (avoid late night eating & lying down soon after eating), elevate head of bed if nocturnal symptoms are present, smoking cessation (if current smoker), & weight loss (if overweight).   -Educated to avoid known foods which trigger reflux symptoms & to minimize/avoid high-fat foods, chocolate, caffeine, citrus, alcohol, & tomato products.  -Advised to avoid/limit use of NSAID's, since they can cause GI upset, bleeding, and/or ulcers. If needed, take with food.     Encounter for monitoring long-term proton pump inhibitor therapy  -     VITAMIN B12; Future; Expected date: 09/18/2023  -     Magnesium; Future; Expected date: 09/18/2023   -Option of tapering/weaning PPI away was also discussed, including the need for possible long term therapy to treat symptoms if they recur after cessation of medication, as well as to mitigate the risk of developing complications of reflux such as Alcantar's esophagus and/or esophageal cancer.  Patient understands the risks and benefits of treatment with drug versus cessation.  Daily supplementation with MVI with calcium and vitamin D were recommended as was follow up with PCP for bone scan when appropriate.  Labs including B12, folate, CMP, CBC, calcium, and magnesium should be checked at least annually    History of colon polyps   -surveillance colonoscopy due in 6/2024    Family History of Colon Cancer   -surveillance colonoscopy due in 6/2024    Intermittent diarrhea   -start a trigger diary   -limit EtOH, dairy, caffeine   -if symptoms worsen consider stool tests and or  "colonoscopy    Alcohol abuse   - discussed about avoiding alcohol use and if not able to avoid completely, recommend decreasing alcohol use. Discussed with patient that the CDC recommends the following: "The Dietary Guidelines for Americans defines moderate drinking as up to 1 drink per day for women and up to 2 drinks per day for men." (obtained from the CDC web site); patient verbalized understanding.    Chronic cough   -continue follow up with pulmonology      Follow up in about 4 weeks (around 10/16/2023), or if symptoms worsen or fail to improve.      If no improvement in symptoms or symptoms worsen, call/follow-up at clinic or go to ER.       [facility] - GASTROENTEROLOGY  [facility], [place]     Dictation software program was used for this note. Please expect some simple typographical  errors in this note.    Encounter includes face to face time and non-face to face time preparing to see the patient (eg, review of tests), obtaining and/or reviewing separately obtained history, documenting clinical information in the electronic or other health record, independently interpreting results (not separately reported) and communicating results to the patient/family/caregiver, or care coordination (not separately reported).        "

## 2023-09-18 ENCOUNTER — OFFICE VISIT (OUTPATIENT)
Dept: GASTROENTEROLOGY | Facility: CLINIC | Age: 73
End: 2023-09-18
Payer: MEDICARE

## 2023-09-18 VITALS
HEART RATE: 76 BPM | DIASTOLIC BLOOD PRESSURE: 83 MMHG | WEIGHT: 198.88 LBS | HEIGHT: 66 IN | SYSTOLIC BLOOD PRESSURE: 159 MMHG | BODY MASS INDEX: 31.96 KG/M2

## 2023-09-18 DIAGNOSIS — F10.10 ALCOHOL ABUSE: ICD-10-CM

## 2023-09-18 DIAGNOSIS — Z79.899 ENCOUNTER FOR MONITORING LONG-TERM PROTON PUMP INHIBITOR THERAPY: ICD-10-CM

## 2023-09-18 DIAGNOSIS — R13.10 PILL DYSPHAGIA: ICD-10-CM

## 2023-09-18 DIAGNOSIS — Z86.010 HISTORY OF COLON POLYPS: ICD-10-CM

## 2023-09-18 DIAGNOSIS — R05.3 CHRONIC COUGH: ICD-10-CM

## 2023-09-18 DIAGNOSIS — R19.7 INTERMITTENT DIARRHEA: ICD-10-CM

## 2023-09-18 DIAGNOSIS — R14.2 BELCHING: ICD-10-CM

## 2023-09-18 DIAGNOSIS — Z51.81 ENCOUNTER FOR MONITORING LONG-TERM PROTON PUMP INHIBITOR THERAPY: ICD-10-CM

## 2023-09-18 DIAGNOSIS — Z80.0 FAMILY HISTORY OF COLON CANCER: ICD-10-CM

## 2023-09-18 DIAGNOSIS — R13.10 DYSPHAGIA, UNSPECIFIED TYPE: Primary | ICD-10-CM

## 2023-09-18 DIAGNOSIS — K21.9 GASTROESOPHAGEAL REFLUX DISEASE, UNSPECIFIED WHETHER ESOPHAGITIS PRESENT: ICD-10-CM

## 2023-09-18 PROCEDURE — 99213 OFFICE O/P EST LOW 20 MIN: CPT | Mod: PBBFAC,PN

## 2023-09-18 PROCEDURE — 99214 PR OFFICE/OUTPT VISIT, EST, LEVL IV, 30-39 MIN: ICD-10-PCS | Mod: S$PBB,,,

## 2023-09-18 PROCEDURE — 99214 OFFICE O/P EST MOD 30 MIN: CPT | Mod: S$PBB,,,

## 2023-09-18 PROCEDURE — 99999 PR PBB SHADOW E&M-EST. PATIENT-LVL III: ICD-10-PCS | Mod: PBBFAC,,,

## 2023-09-18 PROCEDURE — 99999 PR PBB SHADOW E&M-EST. PATIENT-LVL III: CPT | Mod: PBBFAC,,,

## 2023-10-28 NOTE — PROGRESS NOTES
Subjective:    Patient ID:  Zuly Person is a 73 y.o. female who presents for evaluation of   Chief Complaint   Patient presents with    [facility]       HPI:  She comes in today relief for for cardiovascular evaluation.  She has a strong family history of premature heart disease father  of MI at age 62 grandfather  MI at age 50.  She has not smoked for 26 years has a history of a TIA in the past which she was taking chemotherapy which was questionable she was given a statin and aspirin at that time.  She has a history of breast cancer 2016 HER2 negative was taking Herceptin she got radiation chemotherapy and surgery at that time.  She has breast implants.  She is a nontoxic multinodular goiter she of ankle swelling  She wants to started exercise program and lose weight.  Her weight has gone up as well as her cholesterol recently.  EKG is normal sinus rhythm normal EKG    Review of patient's allergies indicates:  No Known Allergies    Past Medical History:   Diagnosis Date    Anxiety     Basal cell carcinoma     Cancer     breast cancer, dx 2015    Colon polyp     GERD (gastroesophageal reflux disease)      Past Surgical History:   Procedure Laterality Date    APPENDECTOMY      BREAST SURGERY      implants    CHOLECYSTECTOMY      COLONOSCOPY      HYSTERECTOMY      MASTECTOMY      UPPER GASTROINTESTINAL ENDOSCOPY      ABOUT 20 YEARS AGO     Social History     Tobacco Use    Smoking status: Former     Current packs/day: 0.00     Average packs/day: 1 pack/day for 15.0 years (15.0 ttl pk-yrs)     Types: Cigarettes     Start date:      Quit date:      Years since quittin.8    Smokeless tobacco: Never   Substance Use Topics    Alcohol use: Yes     Comment: daily bourbon coke 2-3 cups    Drug use: No     Family History   Problem Relation Age of Onset    Heart attack Father     Liver cancer Paternal Uncle     Colon cancer Maternal Grandmother     Melanoma Child     Esophageal cancer Neg Hx      Crohn's disease Neg Hx     Rectal cancer Neg Hx     Stomach cancer Neg Hx     Ulcerative colitis Neg Hx         Review of Systems:   Constitution: Negative for diaphoresis and fever.   HEENT: Negative for nosebleeds.    Cardiovascular: Negative for chest pain       No dyspnea on exertion       No leg swelling        No palpitations  Respiratory: Negative for shortness of breath and wheezing.    Hematologic/Lymphatic: Negative for bleeding problem. Does not bruise/bleed easily.   Skin: Negative for color change and rash.   Musculoskeletal: Negative for falls and myalgias.   Gastrointestinal: Negative for hematemesis and hematochezia.   Genitourinary: Negative for hematuria.   Neurological: Negative for dizziness and light-headedness.   Psychiatric/Behavioral: Negative for altered mental status and memory loss.          Objective:        Vitals:    10/30/23 1406   BP: (!) 157/96   Pulse: 77       Lab Results   Component Value Date    WBC 3.42 (L) 08/31/2023    HGB 14.2 08/31/2023    HCT 41.8 08/31/2023     (L) 08/31/2023    CHOL 224 (H) 08/31/2023    TRIG 86 08/31/2023    HDL 66 08/31/2023    ALT 11 08/31/2023    AST 16 08/31/2023     08/31/2023    K 4.0 08/31/2023     08/31/2023    CREATININE 0.7 08/31/2023    BUN 7 (L) 08/31/2023    CO2 28 08/31/2023    TSH 1.317 08/31/2023        ECHOCARDIOGRAM RESULTS  No results found for this or any previous visit.        CURRENT/PREVIOUS VISIT EKG  Results for orders placed or performed during the hospital encounter of 12/18/19   EKG 12-lead    Collection Time: 12/18/19  4:20 PM    Narrative    Test Reason : R07.9,    Vent. Rate : 073 BPM     Atrial Rate : 073 BPM     P-R Int : 166 ms          QRS Dur : 094 ms      QT Int : 416 ms       P-R-T Axes : 027 003 039 degrees     QTc Int : 458 ms    Normal sinus rhythm  Normal ECG  When compared with ECG of 18-DEC-2019 15:20,  No significant change was found  Confirmed by Irving Rich MD ([id number]) on 12/22/2019 10:26:20  AM    Referred By: AURELIANO   SELF           Confirmed By:Irving Rich MD     No valid procedures specified.   No results found for this or any previous visit.      Physical Exam:  CONSTITUTIONAL: No fever, no chills  HEENT: Normocephalic, atraumatic,pupils reactive to light                 NECK:  No JVD no carotid bruit  CVS: S1S2+, RRR, no murmurs,   LUNGS: Clear  ABDOMEN: Soft, NT, BS+  EXTREMITIES: No cyanosis, edema  : No huggins catheter  NEURO: AAO X 3  PSY: Normal affect      Medication List with Changes/Refills   Current Medications    FLUOXETINE 40 MG CAPSULE    Take 1 capsule (40 mg total) by mouth once daily.    FLUTICASONE PROPIONATE (FLONASE) 50 MCG/ACTUATION NASAL SPRAY    2 sprays (100 mcg total) by Each Nostril route once daily.    MONTELUKAST (SINGULAIR) 10 MG TABLET    Take 1 tablet (10 mg total) by mouth every evening.    OMEPRAZOLE (PRILOSEC) 40 MG CAPSULE    Take 1 capsule (40 mg total) by mouth once daily.             Assessment:       1. Family history of heart disease    2. History of breast cancer    3. Mild intermittent asthma with acute exacerbation    4. Murmur         Plan:     Problem List Items Addressed This Visit          Pulmonary    Mild intermittent asthma with acute exacerbation       Cardiac/Vascular    Murmur       Oncology    History of breast cancer    Overview     Converted from [employer]:  Description - NEOPLASM, MALIGNANT, BREAST          Other Visit Diagnoses       Family history of heart disease    -  Primary          Recommend routine screening cardiovascular examination.  Her EKG is normal.  Recommend is exercise stress test with nuclear.  Recommend echocardiogram.  Last chest x-ray was 2018.  She has elevated cholesterol and says she still has some Lipitor the past although most likely is outdated she does not mind try take it again  I am going to represcribe it as is probably outdated.  She is recommended to eat Mediterranean diet and lifestyle with small portions.   Will give her clearance to exercise.  Will discuss possible aspirin usage in next F/U.  No follow-ups on file.    The patients questions were answered, they verbalized understanding, and agreed with the treatment plan.     JEREMIE GARCIA MD  [facility]

## 2023-10-30 ENCOUNTER — OFFICE VISIT (OUTPATIENT)
Dept: CARDIOLOGY | Facility: CLINIC | Age: 73
End: 2023-10-30
Payer: MEDICARE

## 2023-10-30 ENCOUNTER — TELEPHONE (OUTPATIENT)
Dept: GASTROENTEROLOGY | Facility: CLINIC | Age: 73
End: 2023-10-30
Payer: MEDICARE

## 2023-10-30 VITALS
HEART RATE: 77 BPM | BODY MASS INDEX: 32.65 KG/M2 | WEIGHT: 202.25 LBS | DIASTOLIC BLOOD PRESSURE: 96 MMHG | SYSTOLIC BLOOD PRESSURE: 157 MMHG | OXYGEN SATURATION: 97 %

## 2023-10-30 DIAGNOSIS — I87.2 VENOUS INSUFFICIENCY OF BOTH LOWER EXTREMITIES: ICD-10-CM

## 2023-10-30 DIAGNOSIS — Z85.3 HISTORY OF BREAST CANCER: ICD-10-CM

## 2023-10-30 DIAGNOSIS — R94.31 ABNORMAL ELECTROCARDIOGRAM (ECG) (EKG): ICD-10-CM

## 2023-10-30 DIAGNOSIS — E78.2 MIXED HYPERLIPIDEMIA: ICD-10-CM

## 2023-10-30 DIAGNOSIS — G45.9 TIA (TRANSIENT ISCHEMIC ATTACK): ICD-10-CM

## 2023-10-30 DIAGNOSIS — R01.1 MURMUR: ICD-10-CM

## 2023-10-30 DIAGNOSIS — R13.10 DYSPHAGIA, UNSPECIFIED TYPE: ICD-10-CM

## 2023-10-30 DIAGNOSIS — Z82.49 FAMILY HISTORY OF HEART DISEASE: Primary | ICD-10-CM

## 2023-10-30 DIAGNOSIS — I25.10 ATHEROSCLEROSIS OF NATIVE CORONARY ARTERY OF NATIVE HEART WITHOUT ANGINA PECTORIS: ICD-10-CM

## 2023-10-30 DIAGNOSIS — J45.21 MILD INTERMITTENT ASTHMA WITH ACUTE EXACERBATION: ICD-10-CM

## 2023-10-30 PROCEDURE — 99999 PR PBB SHADOW E&M-EST. PATIENT-LVL III: ICD-10-PCS | Mod: PBBFAC,,, | Performed by: GENERAL PRACTICE

## 2023-10-30 PROCEDURE — 93010 ELECTROCARDIOGRAM REPORT: CPT | Mod: S$PBB,,, | Performed by: GENERAL PRACTICE

## 2023-10-30 PROCEDURE — 99204 OFFICE O/P NEW MOD 45 MIN: CPT | Mod: S$PBB,,, | Performed by: GENERAL PRACTICE

## 2023-10-30 PROCEDURE — 93005 ELECTROCARDIOGRAM TRACING: CPT | Mod: PBBFAC,PN | Performed by: GENERAL PRACTICE

## 2023-10-30 PROCEDURE — 99204 PR OFFICE/OUTPT VISIT, NEW, LEVL IV, 45-59 MIN: ICD-10-PCS | Mod: S$PBB,,, | Performed by: GENERAL PRACTICE

## 2023-10-30 PROCEDURE — 99213 OFFICE O/P EST LOW 20 MIN: CPT | Mod: PBBFAC,PN | Performed by: GENERAL PRACTICE

## 2023-10-30 PROCEDURE — 99999 PR PBB SHADOW E&M-EST. PATIENT-LVL III: CPT | Mod: PBBFAC,,, | Performed by: GENERAL PRACTICE

## 2023-10-30 PROCEDURE — 93010 EKG 12-LEAD: ICD-10-PCS | Mod: S$PBB,,, | Performed by: GENERAL PRACTICE

## 2023-10-30 RX ORDER — ATORVASTATIN CALCIUM 20 MG/1
20 TABLET, FILM COATED ORAL DAILY
Qty: 90 TABLET | Refills: 3 | Status: SHIPPED | OUTPATIENT
Start: 2023-10-30 | End: 2024-10-29

## 2023-10-30 NOTE — TELEPHONE ENCOUNTER
----- Message from Leatha Lewis, Patient Care Assistant sent at 10/30/2023 11:08 AM CDT -----  Regarding: appointment  Contact: pt  Type: Needs Medical Advice    Who Called:  pt     Best Call Back Number: [phone] (home)       Additional Information: pt states she would like a callback regarding rescheduling her appointment on 12/6/23. Please call pt to advise. Thanks!

## 2023-10-30 NOTE — TELEPHONE ENCOUNTER
Call placed to Ms. Person in regards to message received. EGD rescheduled to 1/11 at 1230 arriving at 1130. She accepted. No further issues noted.

## 2023-11-28 ENCOUNTER — TELEPHONE (OUTPATIENT)
Dept: CARDIOLOGY | Facility: HOSPITAL | Age: 73
End: 2023-11-28

## 2023-11-28 NOTE — TELEPHONE ENCOUNTER
Left message on voicemail.     Patient advised, test will be at [facility] ([address]   Will need to register on the first floor at the main entrance.   Patient advised that arrival time is 6:20am.  Patient advised that she may be here about 3.5-4 hours, and may want to bring something to occupy their time, as there will be periods of waiting.    Patient advised, may take her medications prior to testing if you need to.  Advised if she needs to eat to take her medications, please keep it light, like toast and juice.    Patient advised to avoid all caffeine 12 hours prior to testing.  This includes decaf tea and coffee.    Will provide peanut butter crackers for a snack after stress test.  If patient would prefer something else, please bring a snack from home.    Wear comfortable clothing.   No lotions, oils, or powders to the upper chest area. May wear deodorant.    No metal jewelry, buttons, or zippers to the upper body.  Advised to call the office if any questions.

## 2023-11-29 ENCOUNTER — HOSPITAL ENCOUNTER (OUTPATIENT)
Dept: RADIOLOGY | Facility: HOSPITAL | Age: 73
Discharge: HOME | End: 2023-11-29
Attending: GENERAL PRACTICE
Payer: MEDICARE

## 2023-11-29 ENCOUNTER — HOSPITAL ENCOUNTER (OUTPATIENT)
Dept: CARDIOLOGY | Facility: HOSPITAL | Age: 73
Discharge: HOME | End: 2023-11-29
Attending: GENERAL PRACTICE
Payer: MEDICARE

## 2023-11-29 VITALS — HEIGHT: 66 IN | BODY MASS INDEX: 32.47 KG/M2 | WEIGHT: 202 LBS

## 2023-11-29 DIAGNOSIS — R13.10 DYSPHAGIA, UNSPECIFIED TYPE: ICD-10-CM

## 2023-11-29 DIAGNOSIS — I25.10 ATHEROSCLEROSIS OF NATIVE CORONARY ARTERY OF NATIVE HEART WITHOUT ANGINA PECTORIS: ICD-10-CM

## 2023-11-29 DIAGNOSIS — Z85.3 HISTORY OF BREAST CANCER: ICD-10-CM

## 2023-11-29 DIAGNOSIS — Z82.49 FAMILY HISTORY OF HEART DISEASE: ICD-10-CM

## 2023-11-29 DIAGNOSIS — J45.21 MILD INTERMITTENT ASTHMA WITH ACUTE EXACERBATION: ICD-10-CM

## 2023-11-29 DIAGNOSIS — R94.31 ABNORMAL ELECTROCARDIOGRAM (ECG) (EKG): ICD-10-CM

## 2023-11-29 DIAGNOSIS — G45.9 TIA (TRANSIENT ISCHEMIC ATTACK): ICD-10-CM

## 2023-11-29 PROCEDURE — 78452 NUCLEAR STRESS - CARDIOLOGY INTERPRETED (CUPID ONLY): ICD-10-PCS | Mod: 26,,, | Performed by: GENERAL PRACTICE

## 2023-11-29 PROCEDURE — A9502 TC99M TETROFOSMIN: HCPCS

## 2023-11-29 PROCEDURE — 93018 CV STRESS TEST I&R ONLY: CPT | Mod: ,,, | Performed by: GENERAL PRACTICE

## 2023-11-29 PROCEDURE — 93016 CV STRESS TEST SUPVJ ONLY: CPT | Mod: ,,, | Performed by: NURSE PRACTITIONER

## 2023-11-29 PROCEDURE — 71046 X-RAY EXAM CHEST 2 VIEWS: CPT | Mod: TC

## 2023-11-29 PROCEDURE — 78452 HT MUSCLE IMAGE SPECT MULT: CPT

## 2023-11-29 PROCEDURE — 78452 HT MUSCLE IMAGE SPECT MULT: CPT | Mod: 26,,, | Performed by: GENERAL PRACTICE

## 2023-11-29 PROCEDURE — 93306 ECHO (CUPID ONLY): ICD-10-PCS | Mod: 26,,, | Performed by: GENERAL PRACTICE

## 2023-11-29 PROCEDURE — 93016 NUCLEAR STRESS - CARDIOLOGY INTERPRETED (CUPID ONLY): ICD-10-PCS | Mod: ,,, | Performed by: NURSE PRACTITIONER

## 2023-11-29 PROCEDURE — 93306 TTE W/DOPPLER COMPLETE: CPT | Mod: 26,,, | Performed by: GENERAL PRACTICE

## 2023-11-29 PROCEDURE — 93018 NUCLEAR STRESS - CARDIOLOGY INTERPRETED (CUPID ONLY): ICD-10-PCS | Mod: ,,, | Performed by: GENERAL PRACTICE

## 2023-11-29 PROCEDURE — 93306 TTE W/DOPPLER COMPLETE: CPT

## 2023-11-30 LAB
AORTIC ROOT ANNULUS: 3.4 CM
AORTIC VALVE CUSP SEPERATION: 2.1 CM
AV INDEX (PROSTH): 0.85
AV MEAN GRADIENT: 3 MMHG
AV PEAK GRADIENT: 5 MMHG
AV VALVE AREA BY VELOCITY RATIO: 2.69 CM²
AV VALVE AREA: 2.93 CM²
AV VELOCITY RATIO: 0.78
BSA FOR ECHO PROCEDURE: 2.07 M2
CV ECHO LV RWT: 0.6 CM
CV STRESS BASE HR: 73 BPM
DIASTOLIC BLOOD PRESSURE: 88 MMHG
DOP CALC AO PEAK VEL: 1.12 M/S
DOP CALC AO VTI: 22.8 CM
DOP CALC LVOT AREA: 3.5 CM2
DOP CALC LVOT DIAMETER: 2.1 CM
DOP CALC LVOT PEAK VEL: 0.87 M/S
DOP CALC LVOT STROKE VOLUME: 66.81 CM3
DOP CALCLVOT PEAK VEL VTI: 19.3 CM
E WAVE DECELERATION TIME: 211 MSEC
E/A RATIO: 0.73
E/E' RATIO: 8.88 M/S
ECHO LV POSTERIOR WALL: 1.28 CM (ref 0.6–1.1)
EJECTION FRACTION- HIGH: 65 %
END DIASTOLIC INDEX-HIGH: 153 ML/M2
END DIASTOLIC INDEX-LOW: 93 ML/M2
END SYSTOLIC INDEX-HIGH: 71 ML/M2
END SYSTOLIC INDEX-LOW: 31 ML/M2
FRACTIONAL SHORTENING: 33 % (ref 28–44)
INTERVENTRICULAR SEPTUM: 1.48 CM (ref 0.6–1.1)
IVRT: 84 MSEC
LEFT ATRIUM SIZE: 3.7 CM
LEFT INTERNAL DIMENSION IN SYSTOLE: 2.82 CM (ref 2.1–4)
LEFT VENTRICLE DIASTOLIC VOLUME INDEX: 39.98 ML/M2
LEFT VENTRICLE DIASTOLIC VOLUME: 80.36 ML
LEFT VENTRICLE MASS INDEX: 111 G/M2
LEFT VENTRICLE SYSTOLIC VOLUME INDEX: 15 ML/M2
LEFT VENTRICLE SYSTOLIC VOLUME: 30.07 ML
LEFT VENTRICULAR INTERNAL DIMENSION IN DIASTOLE: 4.24 CM (ref 3.5–6)
LEFT VENTRICULAR MASS: 222.56 G
LV LATERAL E/E' RATIO: 8.88 M/S
LV SEPTAL E/E' RATIO: 8.88 M/S
LVOT MG: 2 MMHG
LVOT MV: 0.56 CM/S
MV PEAK A VEL: 0.97 M/S
MV PEAK E VEL: 0.71 M/S
MV STENOSIS PRESSURE HALF TIME: 56 MS
MV VALVE AREA P 1/2 METHOD: 3.93 CM2
NUC REST DIASTOLIC VOLUME INDEX: 81
NUC REST EJECTION FRACTION: 72
NUC REST SYSTOLIC VOLUME INDEX: 23
NUC STRESS DIASTOLIC VOLUME INDEX: 80
NUC STRESS EJECTION FRACTION: 76 %
NUC STRESS SYSTOLIC VOLUME INDEX: 19
OHS CV CPX 1 MINUTE RECOVERY HEART RATE: 123 BPM
OHS CV CPX 85 PERCENT MAX PREDICTED HEART RATE MALE: 125
OHS CV CPX ESTIMATED METS: 7
OHS CV CPX MAX PREDICTED HEART RATE: 147
OHS CV CPX PATIENT IS FEMALE: 1
OHS CV CPX PATIENT IS MALE: 0
OHS CV CPX PEAK DIASTOLIC BLOOD PRESSURE: 85 MMHG
OHS CV CPX PEAK HEAR RATE: 139 BPM
OHS CV CPX PEAK RATE PRESSURE PRODUCT: NORMAL
OHS CV CPX PEAK SYSTOLIC BLOOD PRESSURE: 200 MMHG
OHS CV CPX PERCENT MAX PREDICTED HEART RATE ACHIEVED: 98
OHS CV CPX RATE PRESSURE PRODUCT PRESENTING: NORMAL
PISA TR MAX VEL: 2.01 M/S
RA PRESSURE ESTIMATED: 3 MMHG
RETIRED EF AND QEF - SEE NOTES: 53 %
RIGHT VENTRICULAR END-DIASTOLIC DIMENSION: 3.01 CM
RV TB RVSP: 5 MMHG
STRESS ECHO POST EXERCISE DUR MIN: 3 MINUTES
STRESS ECHO POST EXERCISE DUR SEC: 53 SECONDS
SYSTOLIC BLOOD PRESSURE: 178 MMHG
TDI LATERAL: 0.08 M/S
TDI SEPTAL: 0.08 M/S
TDI: 0.08 M/S
TR MAX PG: 16 MMHG
TV REST PULMONARY ARTERY PRESSURE: 19 MMHG
Z-SCORE OF LEFT VENTRICULAR DIMENSION IN END DIASTOLE: -3.29
Z-SCORE OF LEFT VENTRICULAR DIMENSION IN END SYSTOLE: -1.96

## 2024-01-11 ENCOUNTER — ANESTHESIA (OUTPATIENT)
Dept: ENDOSCOPY | Facility: HOSPITAL | Age: 74
End: 2024-01-11
Payer: MEDICARE

## 2024-01-11 ENCOUNTER — HOSPITAL ENCOUNTER (OUTPATIENT)
Facility: HOSPITAL | Age: 74
Discharge: HOME | End: 2024-01-11
Attending: INTERNAL MEDICINE | Admitting: INTERNAL MEDICINE
Payer: MEDICARE

## 2024-01-11 ENCOUNTER — ANESTHESIA EVENT (OUTPATIENT)
Dept: ENDOSCOPY | Facility: HOSPITAL | Age: 74
End: 2024-01-11
Payer: MEDICARE

## 2024-01-11 DIAGNOSIS — R13.10 DYSPHAGIA: ICD-10-CM

## 2024-01-11 PROCEDURE — 37000009 HC ANESTHESIA EA ADD 15 MINS: Performed by: INTERNAL MEDICINE

## 2024-01-11 PROCEDURE — C1726 CATH, BAL DIL, NON-VASCULAR: HCPCS | Performed by: INTERNAL MEDICINE

## 2024-01-11 PROCEDURE — 43249 ESOPH EGD DILATION <30 MM: CPT | Mod: ,,, | Performed by: INTERNAL MEDICINE

## 2024-01-11 PROCEDURE — 43239 EGD BIOPSY SINGLE/MULTIPLE: CPT | Mod: 59 | Performed by: INTERNAL MEDICINE

## 2024-01-11 PROCEDURE — 25000003 PHARM REV CODE 250: Performed by: INTERNAL MEDICINE

## 2024-01-11 PROCEDURE — 27201012 HC FORCEPS, HOT/COLD, DISP: Performed by: INTERNAL MEDICINE

## 2024-01-11 PROCEDURE — 43251 EGD REMOVE LESION SNARE: CPT | Mod: 51,,, | Performed by: INTERNAL MEDICINE

## 2024-01-11 PROCEDURE — 25000003 PHARM REV CODE 250: Performed by: NURSE ANESTHETIST, CERTIFIED REGISTERED

## 2024-01-11 PROCEDURE — D9220A PRA ANESTHESIA: Mod: CRNA,,, | Performed by: NURSE ANESTHETIST, CERTIFIED REGISTERED

## 2024-01-11 PROCEDURE — 88305 TISSUE EXAM BY PATHOLOGIST: CPT | Mod: TC,59 | Performed by: PATHOLOGY

## 2024-01-11 PROCEDURE — 43251 EGD REMOVE LESION SNARE: CPT | Performed by: INTERNAL MEDICINE

## 2024-01-11 PROCEDURE — D9220A PRA ANESTHESIA: Mod: ANES,,, | Performed by: ANESTHESIOLOGY

## 2024-01-11 PROCEDURE — 27201089 HC SNARE, DISP (ANY): Performed by: INTERNAL MEDICINE

## 2024-01-11 PROCEDURE — 43239 EGD BIOPSY SINGLE/MULTIPLE: CPT | Mod: 59,,, | Performed by: INTERNAL MEDICINE

## 2024-01-11 PROCEDURE — 43249 ESOPH EGD DILATION <30 MM: CPT | Performed by: INTERNAL MEDICINE

## 2024-01-11 PROCEDURE — 63600175 PHARM REV CODE 636 W HCPCS: Performed by: NURSE ANESTHETIST, CERTIFIED REGISTERED

## 2024-01-11 PROCEDURE — 37000008 HC ANESTHESIA 1ST 15 MINUTES: Performed by: INTERNAL MEDICINE

## 2024-01-11 RX ORDER — SODIUM CHLORIDE 9 MG/ML
INJECTION, SOLUTION INTRAVENOUS CONTINUOUS
Status: DISCONTINUED | OUTPATIENT
Start: 2024-01-11 | End: 2024-01-11 | Stop reason: HOSPADM

## 2024-01-11 RX ORDER — LIDOCAINE HYDROCHLORIDE 20 MG/ML
INJECTION, SOLUTION EPIDURAL; INFILTRATION; INTRACAUDAL; PERINEURAL
Status: DISCONTINUED | OUTPATIENT
Start: 2024-01-11 | End: 2024-01-11

## 2024-01-11 RX ORDER — PROPOFOL 10 MG/ML
VIAL (ML) INTRAVENOUS
Status: DISCONTINUED | OUTPATIENT
Start: 2024-01-11 | End: 2024-01-11

## 2024-01-11 RX ADMIN — SODIUM CHLORIDE: 9 INJECTION, SOLUTION INTRAVENOUS at 10:01

## 2024-01-11 RX ADMIN — LIDOCAINE HYDROCHLORIDE 100 MG: 20 INJECTION, SOLUTION EPIDURAL; INFILTRATION; INTRACAUDAL; PERINEURAL at 10:01

## 2024-01-11 RX ADMIN — PROPOFOL 100 MG: 10 INJECTION, EMULSION INTRAVENOUS at 10:01

## 2024-01-11 RX ADMIN — PROPOFOL 50 MG: 10 INJECTION, EMULSION INTRAVENOUS at 10:01

## 2024-01-11 NOTE — ANESTHESIA PREPROCEDURE EVALUATION
01/11/2024  Zuly Person is a 73 y.o., female.      Pre-op Assessment    I have reviewed the Patient Summary Reports.     I have reviewed the Nursing Notes. I have reviewed the NPO Status.   I have reviewed the Medications.     Review of Systems  Anesthesia Hx:  No problems with previous Anesthesia                Hematology/Oncology:                        --  Cancer in past history:       Breast              Pulmonary:    Asthma       Asthma:               Hepatic/GI:     GERD      Gerd          Neurological:  TIA,                         TIA - Transient Ischemic Attack               Endocrine:   Hypothyroidism       Hypothyroidism          Psych:  Psychiatric History                  Physical Exam  General: Well nourished    Airway:  Mallampati: II   Mouth Opening: Normal  TM Distance: Normal  Neck ROM: Normal ROM        Anesthesia Plan  Type of Anesthesia, risks & benefits discussed:    Anesthesia Type: Gen Natural Airway  Intra-op Monitoring Plan: Standard ASA Monitors  Induction:  IV  Informed Consent: Informed consent signed with the Patient and all parties understand the risks and agree with anesthesia plan.  All questions answered.   ASA Score: 3    Ready For Surgery From Anesthesia Perspective.     .

## 2024-01-11 NOTE — ANESTHESIA POSTPROCEDURE EVALUATION
Anesthesia Post Evaluation    Patient: Zuly Person    Procedure(s) Performed: Procedure(s) (LRB):  EGD (ESOPHAGOGASTRODUODENOSCOPY) (N/A)    Final Anesthesia Type: general      Patient location during evaluation: PACU  Patient participation: Yes- Able to Participate  Level of consciousness: awake and alert  Post-procedure vital signs: reviewed and stable  Pain management: adequate  Airway patency: patent    PONV status at discharge: No PONV  Anesthetic complications: no      Cardiovascular status: blood pressure returned to baseline  Respiratory status: unassisted  Hydration status: euvolemic  Follow-up not needed.              Vitals Value Taken Time   /71 01/11/24 1130   Temp 36.2 °C (97.2 °F) 01/11/24 1130   Pulse 92 01/11/24 1130   Resp 16 01/11/24 1130   SpO2 96 % 01/11/24 1130         Event Time   Out of Recovery 11:36:07         Pain/Yesica Score: Yesica Score: 10 (1/11/2024 11:30 AM)

## 2024-01-11 NOTE — H&P
"[facility]    CC: Dysphagia    HPI 73 y.o. female presents for evaluation of dysphagia    Past Medical History:   Diagnosis Date    Anxiety     Basal cell carcinoma     Cancer     breast cancer, dx 2015    Colon polyp     GERD (gastroesophageal reflux disease)        Allergies and Medications reviewed     Review of Systems  General ROS: negative for - chills, fever or weight loss  Cardiovascular ROS: no chest pain or dyspnea on exertion  Gastrointestinal ROS: + dysphagia    Physical Examination  BP (!) 177/79 (BP Location: Left arm, Patient Position: Lying)   Pulse 84   Temp 97.5 °F (36.4 °C) (Skin)   Resp 16   Ht 5' 6" (1.676 m)   Wt 91.6 kg (202 lb)   LMP  (LMP Unknown)   SpO2 97%   Breastfeeding No   BMI 32.60 kg/m²   General appearance: alert, cooperative, no distress  HENT: Normocephalic, atraumatic, neck symmetrical, no nasal discharge, sclera anicteric   Lungs: clear to auscultation bilaterally, symmetric chest wall expansion bilaterally  Heart: regular rate and rhythm without rub; no displacement of the PMI   Abdomen: soft  Extremities: extremities symmetric; no clubbing, cyanosis, or edema        Labs:  Lab Results   Component Value Date    WBC 3.42 (L) 08/31/2023    HGB 14.2 08/31/2023    HCT 41.8 08/31/2023    MCV 92 08/31/2023     (L) 08/31/2023         Assessment:   73 y.o. female presents for EGD    Plan:  -Proceed to eGD    Adrienne Arbour Carona, MD [facility]  [address], [address]  [place] [address]  Office: [phone]  Fax: [phone]   "

## 2024-01-11 NOTE — [employer] PATIENT INSTRUCTIONS
Discharge Summary/Instructions after an Endoscopic Procedure  Patient Name: Zuly Rivera MRN: [id number]  Patient YOB: 1950 Thursday, January 11, 2024  Ellen Timmons MD  Dear patient,  As a result of recent federal legislation (The Federal Cures Act), you may   receive lab or pathology results from your procedure in your [facility]   account before your physician is able to contact you. Your physician or   their representative will relay the results to you with their   recommendations at their soonest availability.  Thank you,  RESTRICTIONS:  During your procedure today, you received medications for sedation.  These   medications may affect your judgment, balance and coordination.  Therefore,   for 24 hours, you have the following restrictions:   - DO NOT drive a car, operate machinery, make legal/financial decisions,   sign important papers or drink alcohol.    ACTIVITY:  Today: no heavy lifting, straining or running due to procedural   sedation/anesthesia.  The following day: return to full activity including work.  DIET:  Eat and drink normally unless instructed otherwise.     TREATMENT FOR COMMON SIDE EFFECTS:  - Mild abdominal pain, nausea, belching, bloating or excessive gas:  rest,   eat lightly and use a heating pad.  - Sore Throat: treat with throat lozenges and/or gargle with warm salt   water.  - Because air was used during the procedure, expelling large amounts of air   from your rectum or belching is normal.  - If a bowel prep was taken, you may not have a bowel movement for 1-3 days.    This is normal.  SYMPTOMS TO WATCH FOR AND REPORT TO YOUR PHYSICIAN:  1. Abdominal pain or bloating, other than gas cramps.  2. Chest pain.  3. Back pain.  4. Signs of infection such as: chills or fever occurring within 24 hours   after the procedure.  5. Rectal bleeding, which would show as bright red, maroon, or black stools.   (A tablespoon of blood from the rectum is not serious,  especially if   hemorrhoids are present.)  6. Vomiting.  7. Weakness or dizziness.  GO DIRECTLY TO THE NEAREST EMERGENCY ROOM IF YOU HAVE ANY OF THE FOLLOWING:      Difficulty breathing              Chills and/or fever over 101 F   Persistent vomiting and/or vomiting blood   Severe abdominal pain   Severe chest pain   Black, tarry stools   Bleeding- more than one tablespoon   Any other symptom or condition that you feel may need urgent attention  Your doctor recommends these additional instructions:  If any biopsies were taken, your doctors clinic will contact you in 1 to 2   weeks with any results.  - Await pathology results.   - Discharge patient to home (with escort).   - Patient has a contact number available for emergencies.  The signs and   symptoms of potential delayed complications were discussed with the   patient.  Return to normal activities tomorrow.  Written discharge   instructions were provided to the patient.   - Resume previous diet.   - Continue present medications.   -If biopsies negative for EoE and dysphagia does not improve with empiric   dilation then will order esophagram as next step  For questions, problems or results please call your physician - Ellen Timmons MD at Work:  [phone].  [facility], EMERGENCY ROOM PHONE NUMBER: [phone]  IF A COMPLICATION OR EMERGENCY SITUATION ARISES AND YOU ARE UNABLE TO REACH   YOUR PHYSICIAN - GO DIRECTLY TO THE EMERGENCY ROOM.  Ellen Timmons MD  1/11/2024 11:01:30 AM  This report has been verified and signed electronically.  Dear patient,  As a result of recent federal legislation (The Federal Cures Act), you may   receive lab or pathology results from your procedure in your [facility]   account before your physician is able to contact you. Your physician or   their representative will relay the results to you with their   recommendations at their soonest availability.  Thank you,  [employer]ATION

## 2024-01-11 NOTE — TRANSFER OF CARE
"Anesthesia Transfer of Care Note    Patient: Zuly Person    Procedure(s) Performed: Procedure(s) (LRB):  EGD (ESOPHAGOGASTRODUODENOSCOPY) (N/A)    Patient location: PACU    Anesthesia Type: general    Transport from OR: Transported from OR on room air with adequate spontaneous ventilation    Post pain: adequate analgesia    Post assessment: no apparent anesthetic complications and tolerated procedure well    Post vital signs: stable    Level of consciousness: awake, alert and oriented    Nausea/Vomiting: no nausea/vomiting    Complications: none    Transfer of care protocol was followed      Last vitals: Visit Vitals  BP (!) 177/79 (BP Location: Left arm, Patient Position: Lying)   Pulse 84   Temp 36.4 °C (97.5 °F) (Skin)   Resp 16   Ht 5' 6" (1.676 m)   Wt 91.6 kg (202 lb)   LMP  (LMP Unknown)   SpO2 97%   Breastfeeding No   BMI 32.60 kg/m²     "

## 2024-01-12 VITALS
RESPIRATION RATE: 16 BRPM | WEIGHT: 202 LBS | SYSTOLIC BLOOD PRESSURE: 144 MMHG | TEMPERATURE: 97 F | BODY MASS INDEX: 32.47 KG/M2 | HEART RATE: 92 BPM | DIASTOLIC BLOOD PRESSURE: 71 MMHG | OXYGEN SATURATION: 96 % | HEIGHT: 66 IN

## 2024-02-15 ENCOUNTER — OFFICE VISIT (OUTPATIENT)
Dept: CARDIOLOGY | Facility: CLINIC | Age: 74
End: 2024-02-15
Payer: MEDICARE

## 2024-02-15 VITALS
BODY MASS INDEX: 31.31 KG/M2 | HEART RATE: 77 BPM | OXYGEN SATURATION: 97 % | WEIGHT: 194 LBS | SYSTOLIC BLOOD PRESSURE: 120 MMHG | DIASTOLIC BLOOD PRESSURE: 82 MMHG

## 2024-02-15 DIAGNOSIS — E78.2 MIXED HYPERLIPIDEMIA: ICD-10-CM

## 2024-02-15 DIAGNOSIS — Z82.49 FAMILY HISTORY OF HEART DISEASE: ICD-10-CM

## 2024-02-15 DIAGNOSIS — G45.9 TIA (TRANSIENT ISCHEMIC ATTACK): ICD-10-CM

## 2024-02-15 DIAGNOSIS — I87.2 VENOUS INSUFFICIENCY OF BOTH LOWER EXTREMITIES: ICD-10-CM

## 2024-02-15 DIAGNOSIS — I25.10 ATHEROSCLEROSIS OF NATIVE CORONARY ARTERY OF NATIVE HEART WITHOUT ANGINA PECTORIS: ICD-10-CM

## 2024-02-15 DIAGNOSIS — R01.1 MURMUR: ICD-10-CM

## 2024-02-15 DIAGNOSIS — R94.31 ABNORMAL ELECTROCARDIOGRAM (ECG) (EKG): ICD-10-CM

## 2024-02-15 DIAGNOSIS — I51.89 DIASTOLIC DYSFUNCTION: ICD-10-CM

## 2024-02-15 DIAGNOSIS — Z13.6 ENCOUNTER FOR SCREENING FOR CARDIOVASCULAR DISORDERS: ICD-10-CM

## 2024-02-15 DIAGNOSIS — Z85.3 HISTORY OF BREAST CANCER: ICD-10-CM

## 2024-02-15 DIAGNOSIS — I51.7 LVH (LEFT VENTRICULAR HYPERTROPHY): Primary | ICD-10-CM

## 2024-02-15 PROCEDURE — 99214 OFFICE O/P EST MOD 30 MIN: CPT | Mod: S$PBB,,, | Performed by: GENERAL PRACTICE

## 2024-02-15 PROCEDURE — 99999 PR PBB SHADOW E&M-EST. PATIENT-LVL III: CPT | Mod: PBBFAC,,, | Performed by: GENERAL PRACTICE

## 2024-02-15 PROCEDURE — 99213 OFFICE O/P EST LOW 20 MIN: CPT | Mod: PBBFAC,PN | Performed by: GENERAL PRACTICE

## 2024-02-15 RX ORDER — METOPROLOL TARTRATE 25 MG/1
25 TABLET, FILM COATED ORAL
Qty: 60 TABLET | Refills: 11 | Status: SHIPPED | OUTPATIENT
Start: 2024-02-15

## 2024-02-15 NOTE — PROGRESS NOTES
[facility]    Patient ID:  Zuly Person is a 73 y.o. female who presents for follow-up of   Chief Complaint   Patient presents with    Results     Echo / stress       HPI:      She comes in today relief for for cardiovascular evaluation.  She has a strong family history of premature heart disease father  of MI at age 62 grandfather  MI at age 50.  She has not smoked for 26 years has a history of a TIA in the past which she was taking chemotherapy which was questionable she was given a statin and aspirin at that time.  She has a history of breast cancer 2016 HER2 negative was taking Herceptin she got radiation chemotherapy and surgery at that time.  She has breast implants.  She is a nontoxic multinodular goiter she of ankle swelling  She wants to started exercise program and lose weight.  Her weight has gone up as well as her cholesterol recently.  EKG is normal sinus rhythm normal EKG       Recommend routine screening cardiovascular examination.  Her EKG is normal.  Recommend is exercise stress test with nuclear.  Recommend echocardiogram.  Last chest x-ray was 2018.  She has elevated cholesterol and says she still has some Lipitor the past although most likely is outdated she does not mind try take it again  I am going to represcribe it as is probably outdated.  She is recommended to eat Mediterranean diet and lifestyle with small portions.  Will give her clearance to exercise.        Will discuss possible aspirin usage in next F/U   Equivocal myocardial perfusion scan.    There is a mild intensity, small to moderate sized, mostly reversible perfusion abnormality with some fixed areas in the lateral wall(s) in the typical distribution of the LCX territory.    There are no other significant perfusion abnormalities.    The gated perfusion images showed an ejection fraction of 72% at rest. The gated perfusion images showed an ejection fraction of 76% post stress. Normal ejection  fraction is greater than 53%.    There is normal wall motion at rest and post stress.    LV cavity size is normal at rest.    The ECG portion of the study is negative for ischemia.    The patient reported no chest pain during the stress test.    During stress, rare PVCs are noted.    The patient exercised for 3 minutes 53 seconds on a Miguel protocol, corresponding to a functional capacity of 7 METS, achieving a peak heart rate of 139 bpm, which is 98 % of the age predicted maximum heart rate.    TID 0.88    Mild lateral fixed hyper for fusion about 3% of the circumflex area with total myocardial reversibility about 3% which is probably not clinically significant.  Clinical correlation suggested  Review of patient's allergies indicates:  No Known Allergies    Past Medical History:   Diagnosis Date    Anxiety     Basal cell carcinoma     Cancer     breast cancer, dx     Colon polyp     GERD (gastroesophageal reflux disease)      Past Surgical History:   Procedure Laterality Date    APPENDECTOMY      BREAST SURGERY      implants    CHOLECYSTECTOMY      COLONOSCOPY      ESOPHAGOGASTRODUODENOSCOPY N/A 2024    Procedure: EGD (ESOPHAGOGASTRODUODENOSCOPY);  Surgeon: Ellen Timmons MD;  [facility];  Service: Endoscopy;  Laterality: N/A;    HYSTERECTOMY      MASTECTOMY      UPPER GASTROINTESTINAL ENDOSCOPY      ABOUT 20 YEARS AGO     Social History     Tobacco Use    Smoking status: Former     Current packs/day: 0.00     Average packs/day: 1 pack/day for 15.0 years (15.0 ttl pk-yrs)     Types: Cigarettes     Start date:      Quit date:      Years since quittin.1    Smokeless tobacco: Never   Substance Use Topics    Alcohol use: Yes     Comment: daily bourbon coke 2-3 cups    Drug use: No     Family History   Problem Relation Age of Onset    Heart attack Father     Liver cancer Paternal Uncle     Colon cancer Maternal Grandmother     Melanoma Child     Esophageal cancer Neg Hx     Crohn's  disease Neg Hx     Rectal cancer Neg Hx     Stomach cancer Neg Hx     Ulcerative colitis Neg Hx         Review of Systems:   Constitution: Negative for diaphoresis and fever.   HEENT: Negative for nosebleeds.    Cardiovascular: Negative for chest pain       No dyspnea on exertion       No leg swelling        No palpitations  Respiratory: Negative for shortness of breath and wheezing.    Hematologic/Lymphatic: Negative for bleeding problem. Does not bruise/bleed easily.   Skin: Negative for color change and rash.   Musculoskeletal: Negative for falls and myalgias.   Gastrointestinal: Negative for hematemesis and hematochezia.   Genitourinary: Negative for hematuria.   Neurological: Negative for dizziness and light-headedness.   Psychiatric/Behavioral: Negative for altered mental status and memory loss.          Objective:        Vitals:    02/15/24 0852   BP: 120/82   Pulse: 77       Lab Results   Component Value Date    WBC 3.42 (L) 08/31/2023    HGB 14.2 08/31/2023    HCT 41.8 08/31/2023     (L) 08/31/2023    CHOL 224 (H) 08/31/2023    TRIG 86 08/31/2023    HDL 66 08/31/2023    ALT 11 08/31/2023    AST 16 08/31/2023     08/31/2023    K 4.0 08/31/2023     08/31/2023    CREATININE 0.7 08/31/2023    BUN 7 (L) 08/31/2023    CO2 28 08/31/2023    TSH 1.317 08/31/2023        ECHOCARDIOGRAM RESULTS  Results for orders placed during the hospital encounter of 11/29/23    Echo Saline Bubble? No    Interpretation Summary    Left Ventricle: The left ventricle is normal in size. Moderately increased wall thickness. There is concentric hypertrophy. Normal wall motion. There is normal systolic function with a visually estimated ejection fraction of 60 - 65%. There is diastolic dysfunction. E/A ratio is 0.73.    Right Ventricle: Normal right ventricular cavity size. Wall thickness is normal. Right ventricle wall motion  is normal. Systolic function is normal.    Tricuspid Valve: There is mild regurgitation.     IVC/SVC: Normal venous pressure at 3 mmHg.        CURRENT/PREVIOUS VISIT EKG  Results for orders placed or performed in visit on 10/30/23   IN OFFICE EKG 12-LEAD (to [place])    Collection Time: 10/30/23  2:03 PM    Narrative    Test Reason : Z82.49,    Vent. Rate : 076 BPM     Atrial Rate : 076 BPM     P-R Int : 172 ms          QRS Dur : 092 ms      QT Int : 402 ms       P-R-T Axes : 049 042 046 degrees     QTc Int : 452 ms    Normal sinus rhythm  Normal ECG  When compared with ECG of 18-DEC-2019 16:20,  No significant change was found  Confirmed by eJff Costa MD ([id number]) on 12/2/2023 12:21:36 PM    Referred By: PABLO MCGOWAN           Confirmed By:Jeff Costa MD     No valid procedures specified.   Results for orders placed during the hospital encounter of 11/29/23    Nuclear Stress - Cardiology Interpreted    Interpretation Summary    Equivocal myocardial perfusion scan.    There is a mild intensity, small to moderate sized, mostly reversible perfusion abnormality with some fixed areas in the lateral wall(s) in the typical distribution of the LCX territory.    There are no other significant perfusion abnormalities.    The gated perfusion images showed an ejection fraction of 72% at rest. The gated perfusion images showed an ejection fraction of 76% post stress. Normal ejection fraction is greater than 53%.    There is normal wall motion at rest and post stress.    LV cavity size is normal at rest.    The ECG portion of the study is negative for ischemia.    The patient reported no chest pain during the stress test.    During stress, rare PVCs are noted.    The patient exercised for 3 minutes 53 seconds on a Miguel protocol, corresponding to a functional capacity of 7 METS, achieving a peak heart rate of 139 bpm, which is 98 % of the age predicted maximum heart rate.    TID 0.88    Mild lateral fixed hyper for fusion about 3% of the circumflex area with total myocardial reversibility about 3% which is  probably not clinically significant.  Clinical correlation suggested      Physical Exam:  CONSTITUTIONAL: No fever, no chills  HEENT: Normocephalic, atraumatic,pupils reactive to light                 NECK:  No JVD no carotid bruit  CVS: S1S2+, RRR, no murmurs,   LUNGS: Clear  ABDOMEN: Soft, NT, BS+  EXTREMITIES: No cyanosis, edema  : No huggins catheter  NEURO: AAO X 3  PSY: Normal affect      Medication List with Changes/Refills   Current Medications    ATORVASTATIN (LIPITOR) 20 MG TABLET    Take 1 tablet (20 mg total) by mouth once daily.    FLUOXETINE 40 MG CAPSULE    Take 1 capsule (40 mg total) by mouth once daily.    FLUTICASONE PROPIONATE (FLONASE) 50 MCG/ACTUATION NASAL SPRAY    2 sprays (100 mcg total) by Each Nostril route once daily.    MONTELUKAST (SINGULAIR) 10 MG TABLET    Take 1 tablet (10 mg total) by mouth every evening.    OMEPRAZOLE (PRILOSEC) 40 MG CAPSULE    Take 1 capsule (40 mg total) by mouth once daily.             Assessment:       1. LVH (left ventricular hypertrophy)    2. Diastolic dysfunction    3. Mixed hyperlipidemia    4. Abnormal electrocardiogram (ECG) (EKG)    5. Atherosclerosis of native coronary artery of native heart without angina pectoris    6. Venous insufficiency of both lower extremities    7. Family history of heart disease    8. History of breast cancer    9. TIA (transient ischemic attack)    10. Murmur         Plan:     Problem List Items Addressed This Visit          Neuro    TIA (transient ischemic attack)    Overview     Last Assessment & Plan:   ASSESSMENT:    The patient's condition is improved.    PLAN:    ccc  On aspirin   statins            Cardiac/Vascular    Murmur       Oncology    History of breast cancer    Overview     Converted from Cent[employer]:  Description - NEOPLASM, MALIGNANT, BREAST          Other Visit Diagnoses       LVH (left ventricular hypertrophy)    -  Primary    Relevant Orders    Stress Echo Which stress agent will be used? Treadmill  Exercise; Color Flow Doppler? No    Diastolic dysfunction        Mixed hyperlipidemia        Relevant Orders    Stress Echo Which stress agent will be used? Treadmill Exercise; Color Flow Doppler? No    Abnormal electrocardiogram (ECG) (EKG)        Relevant Orders    Stress Echo Which stress agent will be used? Treadmill Exercise; Color Flow Doppler? No    Atherosclerosis of native coronary artery of native heart without angina pectoris        Relevant Orders    Stress Echo Which stress agent will be used? Treadmill Exercise; Color Flow Doppler? No    Venous insufficiency of both lower extremities        Relevant Orders    Stress Echo Which stress agent will be used? Treadmill Exercise; Color Flow Doppler? No    Family history of heart disease                No follow-ups on file.    THERE IS A QUESTION OF REVERSIBLE ISCHEMIA SEEN ON THE LATERAL WALL OF THE STRESS TEST ALTHOUGH MAY NOT BE CLINICALLY SIGNIFICANT BULL'S-EYE DAY I RECOMMEND A CARDIAC CTA TO GET A CALCIUM SCORE BECAUSE OF THE ELEVATED LDL CHOLESTEROL FOR CHOLESTEROL MANAGEMENT AS WELL AS VISUALIZATION OF THE CORONARY ARTERIES BECAUSE OF THE THERE ABNORMALITIES ON THE STRESS TEST.     HER LDL CHOLESTEROL WAS VERY HIGH LAST TIME WITH LDL  AND SHE HAS ON A PASS CRITERION DIET.  WE DID DISCUSS STEROLS AND STAY NOSE WHICH ARE SHOWN TO LOWER YOUR CHOLESTEROL AND NATURALLY AS WELL AS SOLUBLE FIBERS.    HE IS LOST ABOUT 7 LB USING THE PESCATARIAN DIET AND IS CONTINUING The patients questions were answered, they verbalized understanding, and agreed with the treatment plan.     JEREMIE GARCIA MD  [facility]

## 2024-04-10 ENCOUNTER — OFFICE VISIT (OUTPATIENT)
Dept: PODIATRY | Facility: CLINIC | Age: 74
End: 2024-04-10
Payer: MEDICARE

## 2024-04-10 ENCOUNTER — HOSPITAL ENCOUNTER (OUTPATIENT)
Dept: RADIOLOGY | Facility: HOSPITAL | Age: 74
Discharge: HOME | End: 2024-04-10
Attending: PODIATRIST
Payer: MEDICARE

## 2024-04-10 VITALS
BODY MASS INDEX: 31.66 KG/M2 | SYSTOLIC BLOOD PRESSURE: 139 MMHG | HEIGHT: 66 IN | DIASTOLIC BLOOD PRESSURE: 79 MMHG | HEART RATE: 82 BPM | WEIGHT: 197 LBS

## 2024-04-10 DIAGNOSIS — M19.079 OSTEOARTHRITIS OF ANKLE AND FOOT, UNSPECIFIED LATERALITY: ICD-10-CM

## 2024-04-10 DIAGNOSIS — M79.671 FOOT PAIN, RIGHT: Primary | ICD-10-CM

## 2024-04-10 DIAGNOSIS — M79.671 FOOT PAIN, RIGHT: ICD-10-CM

## 2024-04-10 DIAGNOSIS — D36.10 NEUROMA: ICD-10-CM

## 2024-04-10 PROCEDURE — 73630 X-RAY EXAM OF FOOT: CPT | Mod: 26,RT,, | Performed by: RADIOLOGY

## 2024-04-10 PROCEDURE — 99999 PR PBB SHADOW E&M-EST. PATIENT-LVL III: CPT | Mod: PBBFAC,,, | Performed by: PODIATRIST

## 2024-04-10 PROCEDURE — 99203 OFFICE O/P NEW LOW 30 MIN: CPT | Mod: 25,S$PBB,, | Performed by: PODIATRIST

## 2024-04-10 PROCEDURE — 99213 OFFICE O/P EST LOW 20 MIN: CPT | Mod: PBBFAC,25 | Performed by: PODIATRIST

## 2024-04-10 PROCEDURE — 73630 X-RAY EXAM OF FOOT: CPT | Mod: TC,RT

## 2024-04-10 PROCEDURE — 99999PBSHW PR PBB SHADOW TECHNICAL ONLY FILED TO HB: Mod: PBBFAC,,,

## 2024-04-10 PROCEDURE — 96372 THER/PROPH/DIAG INJ SC/IM: CPT | Mod: PBBFAC,59

## 2024-04-10 RX ORDER — DICLOFENAC SODIUM 10 MG/G
2 GEL TOPICAL 3 TIMES DAILY
Qty: 200 G | Refills: 4 | Status: SHIPPED | OUTPATIENT
Start: 2024-04-10 | End: 2024-05-10

## 2024-04-10 RX ORDER — BETAMETHASONE SODIUM PHOSPHATE AND BETAMETHASONE ACETATE 3; 3 MG/ML; MG/ML
18 INJECTION, SUSPENSION INTRA-ARTICULAR; INTRALESIONAL; INTRAMUSCULAR; SOFT TISSUE
Status: COMPLETED | OUTPATIENT
Start: 2024-04-10 | End: 2024-04-10

## 2024-04-10 RX ORDER — DICLOFENAC SODIUM 75 MG/1
75 TABLET, DELAYED RELEASE ORAL 2 TIMES DAILY
Qty: 60 TABLET | Refills: 1 | Status: SHIPPED | OUTPATIENT
Start: 2024-04-10 | End: 2024-06-09

## 2024-04-10 RX ORDER — KETOROLAC TROMETHAMINE 30 MG/ML
60 INJECTION, SOLUTION INTRAMUSCULAR; INTRAVENOUS
Status: COMPLETED | OUTPATIENT
Start: 2024-04-10 | End: 2024-04-10

## 2024-04-10 RX ADMIN — BETAMETHASONE SODIUM PHOSPHATE AND BETAMETHASONE ACETATE 18 MG: 3; 3 INJECTION, SUSPENSION INTRA-ARTICULAR; INTRALESIONAL; INTRAMUSCULAR at 04:04

## 2024-04-10 RX ADMIN — KETOROLAC TROMETHAMINE 60 MG: 30 INJECTION, SOLUTION INTRAMUSCULAR at 04:04

## 2024-04-13 NOTE — PROGRESS NOTES
Subjective:       Patient ID: Zuly Person is a 73 y.o. female.    Chief Complaint: No chief complaint on file.    Foot Pain  Associated symptoms include arthralgias and joint swelling.    Patient presents with a complaint of pain in the balls of her feet the left is slightly worse than the right this has been going on for several months and has gotten progressively worse patient relates a burning type pain.    Review of Systems   Musculoskeletal:  Positive for arthralgias and joint swelling.   All other systems reviewed and are negative.      Objective:      Physical Exam  Vitals and nursing note reviewed.   Constitutional:       Appearance: Normal appearance. She is well-developed.   Cardiovascular:      Pulses:           Dorsalis pedis pulses are 2+ on the right side and 2+ on the left side.        Posterior tibial pulses are 1+ on the right side and 1+ on the left side.   Pulmonary:      Effort: Pulmonary effort is normal.   Musculoskeletal:         General: Swelling, tenderness and deformity present. Normal range of motion.      Right foot: Deformity and prominent metatarsal heads present.      Left foot: Deformity and prominent metatarsal heads present.        Feet:    Feet:      Right foot:      Protective Sensation: 4 sites tested.  4 sites sensed.      Skin integrity: Warmth and callus present.      Left foot:      Protective Sensation: 4 sites tested.  4 sites sensed.      Skin integrity: Callus present.   Skin:     General: Skin is warm.      Capillary Refill: Capillary refill takes 2 to 3 seconds.      Findings: Erythema present.   Neurological:      General: No focal deficit present.      Mental Status: She is alert.   Psychiatric:         Mood and Affect: Mood normal.         Behavior: Behavior normal.         Thought Content: Thought content normal.         Judgment: Judgment normal.                       Assessment:       1. Foot pain, right    2. Osteoarthritis of ankle and foot, unspecified  laterality    3. Neuroma        Plan:        Patient presents with a complaint of pain in the balls of her feet the left is slightly worse than the right this has been going on for several months and has gotten progressively worse patient relates a burning type pain.  On evaluation patient has findings consistent with neuroma primarily between the 2nd and 3rd metatarsals of the right foot.  X-rays reviewed today patient has no fractures no signs of dislocation there is an ossicle located near and around the cuboid bone right however this is certainly not causing the patient's discomfort she does have significant narrowing between the 2nd and 3rd metatarsals consistent with neuroma right foot.  I did advised the patient that she needs to make sure she has appropriate support at all times she has very high arches and clearly she has not getting enough support patient states increased activity causes increased pain in the plantar forefoot right greater than left she also has degenerative arthritic changes of the midfoot right.  Patient is currently taking diclofenac at night only I have recommended she start taking diclofenac twice a day every day.  I did explain to the patient that typically this only hurts with activity or weight-bearing which is consistent with her current complaint I am going to have her start applying Voltaren gel to the area 3 times a day take diclofenac twice a day I have recommended Oofos to be worn instead of the flat flip-flops that she is currently wearing she was administered a Celestone shot IM right side Ketoralac shot IM left side for inflammation.  Patient did indicate that her flip-flops or the most comfortable when she wears a shoe it tends to squeeze her foot making the neuroma pain worse I advised her I understand this she is going to have to wear shoe that has a wider toe box but she should look into the flip-flops or slides that I have recommended.  I do plan to follow up with the  patient in several weeks I will re-evaluate how she is doing certainly if her condition worsens before then she is to contact us immediately.  Patient did indicate today she has had some swelling in the forefoot which is not present in the morning but only occurs after she has been on her feet for a period of time I did advised the patient that the elevated arch not getting enough support can certainly put excessive pressure on the ball of the foot and forefoot contributing to the nerve related discomfort therefore she needs to make sure she is using proper supportive shoes or shoe gear at all times.  X-rays reviewed at length and in detail.  Patient was advised that the nerve pain can radiate up the foot to the level of the ankle which clearly it is currently doing.  Patient tolerated the to IM injections well.  This includes making adjustments and additions to the patient's arch supports.

## 2024-04-30 ENCOUNTER — TELEPHONE (OUTPATIENT)
Dept: RADIOLOGY | Facility: HOSPITAL | Age: 74
End: 2024-04-30

## 2024-04-30 NOTE — NURSING
Cardiac CTA scheduled @ [facility] on 5/17 @ 9am with arrival @ 815. Pt will have blood work done prior to 5/17. Pre-procedure instructions given and understanding verbalized.

## 2024-05-01 ENCOUNTER — OFFICE VISIT (OUTPATIENT)
Dept: PODIATRY | Facility: CLINIC | Age: 74
End: 2024-05-01
Payer: MEDICARE

## 2024-05-01 VITALS
HEART RATE: 76 BPM | DIASTOLIC BLOOD PRESSURE: 80 MMHG | WEIGHT: 197.06 LBS | BODY MASS INDEX: 31.81 KG/M2 | SYSTOLIC BLOOD PRESSURE: 118 MMHG

## 2024-05-01 DIAGNOSIS — M19.079 OSTEOARTHRITIS OF ANKLE AND FOOT, UNSPECIFIED LATERALITY: ICD-10-CM

## 2024-05-01 DIAGNOSIS — D36.10 NEUROMA: Primary | ICD-10-CM

## 2024-05-01 DIAGNOSIS — M79.671 FOOT PAIN, RIGHT: ICD-10-CM

## 2024-05-01 PROCEDURE — 99213 OFFICE O/P EST LOW 20 MIN: CPT | Mod: PBBFAC | Performed by: PODIATRIST

## 2024-05-01 PROCEDURE — 99999 PR PBB SHADOW E&M-EST. PATIENT-LVL III: CPT | Mod: PBBFAC,,, | Performed by: PODIATRIST

## 2024-05-01 PROCEDURE — 99213 OFFICE O/P EST LOW 20 MIN: CPT | Mod: S$PBB,,, | Performed by: PODIATRIST

## 2024-05-04 NOTE — PROGRESS NOTES
Subjective:       Patient ID: Zuly Person is a 73 y.o. female.    Chief Complaint: Foot Pain (Right foot ) and Follow-up  Presents today for follow-up bilateral foot pain left greater than right.  Foot Pain  Associated symptoms include arthralgias and joint swelling.   Follow-up  Associated symptoms include arthralgias and joint swelling.        Review of Systems   Musculoskeletal:  Positive for arthralgias and joint swelling.   All other systems reviewed and are negative.      Objective:      Physical Exam  Vitals and nursing note reviewed.   Constitutional:       Appearance: Normal appearance. She is well-developed.   Cardiovascular:      Pulses:           Dorsalis pedis pulses are 2+ on the right side and 2+ on the left side.        Posterior tibial pulses are 1+ on the right side and 1+ on the left side.   Pulmonary:      Effort: Pulmonary effort is normal.   Musculoskeletal:         General: Swelling, tenderness and deformity present. Normal range of motion.      Right foot: Deformity and prominent metatarsal heads present.      Left foot: Deformity and prominent metatarsal heads present.   Feet:      Right foot:      Protective Sensation: 4 sites tested.  4 sites sensed.      Skin integrity: Callus present.      Left foot:      Protective Sensation: 4 sites tested.  4 sites sensed.      Skin integrity: Callus present.   Skin:     General: Skin is warm.      Capillary Refill: Capillary refill takes 2 to 3 seconds.      Findings: Erythema present.   Neurological:      General: No focal deficit present.      Mental Status: She is alert.   Psychiatric:         Mood and Affect: Mood normal.         Behavior: Behavior normal.         Thought Content: Thought content normal.         Judgment: Judgment normal.                                 Assessment:       1. Neuroma    2. Osteoarthritis of ankle and foot, unspecified laterality    3. Foot pain, right        Plan:        Patient presents today for follow-up of  pain in the balls of both feet left greater than right.  Patient states she is doing considerably better she states within 5 days of her last appointment her pain was resolved and she is not currently having any discomfort.  Patient is taking the diclofenac as directed she states the IM injections that she received Celestone and Ketoralac on her last visit helped considerably.  Patient only has a little bit of mild discomfort 3rd digit likely related to the gardening she was doing she may have been bending over squatting and this areas still a little bit touchy but she states overall she has doing a lot better she states her inflammation is down considerably she can now bend and flex her ankles.  I will plan to follow up with the patient as needed I do recommend she finish the prescription for the diclofenac certainly if her symptoms return if she has any problems questions or concerns to contact us immediately.  This includes making adjustments and additions to the patient's arch supports.

## 2024-05-16 ENCOUNTER — LAB VISIT (OUTPATIENT)
Dept: LAB | Facility: HOSPITAL | Age: 74
End: 2024-05-16
Attending: GENERAL PRACTICE
Payer: MEDICARE

## 2024-05-16 DIAGNOSIS — R94.31 ABNORMAL ELECTROCARDIOGRAM (ECG) (EKG): ICD-10-CM

## 2024-05-16 DIAGNOSIS — I51.7 LVH (LEFT VENTRICULAR HYPERTROPHY): ICD-10-CM

## 2024-05-16 DIAGNOSIS — E78.2 MIXED HYPERLIPIDEMIA: ICD-10-CM

## 2024-05-16 DIAGNOSIS — I51.89 DIASTOLIC DYSFUNCTION: ICD-10-CM

## 2024-05-16 LAB
CHOLEST SERPL-MCNC: 242 MG/DL (ref 120–199)
CHOLEST/HDLC SERPL: 4.5 {RATIO} (ref 2–5)
CREAT SERPL-MCNC: 0.7 MG/DL (ref 0.5–1.4)
CREAT SERPL-MCNC: 0.7 MG/DL (ref 0.5–1.4)
EST. GFR  (NO RACE VARIABLE): >60 ML/MIN/1.73 M^2
EST. GFR  (NO RACE VARIABLE): >60 ML/MIN/1.73 M^2
HDLC SERPL-MCNC: 54 MG/DL (ref 40–75)
HDLC SERPL: 22.3 % (ref 20–50)
LDLC SERPL CALC-MCNC: 162.4 MG/DL (ref 63–159)
NONHDLC SERPL-MCNC: 188 MG/DL
TRIGL SERPL-MCNC: 128 MG/DL (ref 30–150)

## 2024-05-16 PROCEDURE — 80061 LIPID PANEL: CPT | Performed by: GENERAL PRACTICE

## 2024-05-16 PROCEDURE — 36415 COLL VENOUS BLD VENIPUNCTURE: CPT | Performed by: GENERAL PRACTICE

## 2024-05-16 PROCEDURE — 82565 ASSAY OF CREATININE: CPT | Performed by: GENERAL PRACTICE

## 2024-05-17 ENCOUNTER — HOSPITAL ENCOUNTER (OUTPATIENT)
Dept: RADIOLOGY | Facility: HOSPITAL | Age: 74
Discharge: HOME | End: 2024-05-17
Attending: GENERAL PRACTICE
Payer: MEDICARE

## 2024-05-17 VITALS
DIASTOLIC BLOOD PRESSURE: 79 MMHG | SYSTOLIC BLOOD PRESSURE: 172 MMHG | HEART RATE: 61 BPM | RESPIRATION RATE: 18 BRPM | OXYGEN SATURATION: 96 %

## 2024-05-17 DIAGNOSIS — Z13.6 ENCOUNTER FOR SCREENING FOR CARDIOVASCULAR DISORDERS: ICD-10-CM

## 2024-05-17 PROCEDURE — 75574 CT ANGIO HRT W/3D IMAGE: CPT | Mod: TC

## 2024-05-17 PROCEDURE — 75574 CT ANGIO HRT W/3D IMAGE: CPT | Mod: 26,,, | Performed by: RADIOLOGY

## 2024-05-17 PROCEDURE — 25500020 PHARM REV CODE 255: Performed by: GENERAL PRACTICE

## 2024-05-17 PROCEDURE — 25000003 PHARM REV CODE 250: Performed by: GENERAL PRACTICE

## 2024-05-17 RX ORDER — NITROGLYCERIN 400 UG/1
1 SPRAY ORAL ONCE
Status: COMPLETED | OUTPATIENT
Start: 2024-05-17 | End: 2024-05-17

## 2024-05-17 RX ORDER — METOPROLOL TARTRATE 1 MG/ML
5 INJECTION, SOLUTION INTRAVENOUS ONCE
Status: COMPLETED | OUTPATIENT
Start: 2024-05-17 | End: 2024-05-17

## 2024-05-17 RX ADMIN — IOHEXOL 90 ML: 350 INJECTION, SOLUTION INTRAVENOUS at 10:05

## 2024-05-17 RX ADMIN — METOPROLOL TARTRATE 5 MG: 1 INJECTION, SOLUTION INTRAVENOUS at 09:05

## 2024-05-17 RX ADMIN — NITROGLYCERIN 1 SPRAY: 400 SPRAY ORAL at 09:05

## 2024-05-17 NOTE — NURSING
Patient arrived for cardiac CTA. Patient took 50 MG of Metoprolol 07:15 prior to arrival as instructed . VS taken. Procedure explained.. Patient verbalized understanding.     0915 Patient ambulated to procedure room independently.  Patient connected to heart rate monitor/BP cuff. Peripheral IV inserted. Awaiting CT tech to begin testing.

## 2024-05-17 NOTE — NURSING
Cardiac CTA completed. VS stable. No SS of distress noted. Peripheral IV removed catheter intact.  Patient instructed to maintain adequate water intake. Patient escorted to exit.

## 2024-07-05 ENCOUNTER — OFFICE VISIT (OUTPATIENT)
Dept: FAMILY MEDICINE | Facility: CLINIC | Age: 74
End: 2024-07-05
Payer: MEDICARE

## 2024-07-05 VITALS
SYSTOLIC BLOOD PRESSURE: 136 MMHG | BODY MASS INDEX: 31.92 KG/M2 | HEIGHT: 66 IN | HEART RATE: 81 BPM | OXYGEN SATURATION: 96 % | DIASTOLIC BLOOD PRESSURE: 80 MMHG | RESPIRATION RATE: 18 BRPM | WEIGHT: 198.63 LBS

## 2024-07-05 DIAGNOSIS — C50.919 PRIMARY MALIGNANT NEOPLASM OF FEMALE BREAST: ICD-10-CM

## 2024-07-05 DIAGNOSIS — F33.1 MAJOR DEPRESSIVE DISORDER, RECURRENT, MODERATE: ICD-10-CM

## 2024-07-05 DIAGNOSIS — A49.9 BACTERIAL INFECTION: Primary | ICD-10-CM

## 2024-07-05 PROCEDURE — 99999 PR PBB SHADOW E&M-EST. PATIENT-LVL III: CPT | Mod: PBBFAC,,, | Performed by: STUDENT IN AN ORGANIZED HEALTH CARE EDUCATION/TRAINING PROGRAM

## 2024-07-05 PROCEDURE — 99213 OFFICE O/P EST LOW 20 MIN: CPT | Mod: PBBFAC | Performed by: STUDENT IN AN ORGANIZED HEALTH CARE EDUCATION/TRAINING PROGRAM

## 2024-07-05 RX ORDER — AMOXICILLIN AND CLAVULANATE POTASSIUM 875; 125 MG/1; MG/1
1 TABLET, FILM COATED ORAL 2 TIMES DAILY
Qty: 10 TABLET | Refills: 0 | Status: SHIPPED | OUTPATIENT
Start: 2024-07-05 | End: 2024-07-15

## 2024-07-05 RX ORDER — GUAIFENESIN 600 MG/1
1200 TABLET, EXTENDED RELEASE ORAL 2 TIMES DAILY
Qty: 14 TABLET | Refills: 0 | Status: SHIPPED | OUTPATIENT
Start: 2024-07-05

## 2024-07-05 NOTE — PROGRESS NOTES
[facility] Note    Subjective:    The HPI and pertinent ROS is included in the Diagnostic Impression Remarks section at the end of the note. Please see below for further details. Chief complaint is at end of note.     Zuly is a pleasant intelligent patient who is here for evaluation.     Modified Medications    No medications on file       Data reviewed 274}  Previous medical records reviewed and summarized in plan section at end of note.      If you are due for any health screening(s) below please notify me so we can arrange them to be ordered and scheduled. Most healthy patients at your age complete them, but you are free to accept or refuse. If you can't do it, I'll definitely understand. If you can, I'd certainly appreciate it!     All of your core healthy metrics are met.      The following portions of the patient's history were reviewed and updated as appropriate: allergies, current medications, past family history, past medical history, past social history, past surgical history and problem list.    She  has a past medical history of Anxiety, Basal cell carcinoma, Cancer, Colon polyp, and GERD (gastroesophageal reflux disease).  She  has a past surgical history that includes Cholecystectomy; Hysterectomy; Breast surgery; Mastectomy; Upper gastrointestinal endoscopy; Colonoscopy; Appendectomy; and Esophagogastroduodenoscopy (N/A, 1/11/2024).    She  reports that she quit smoking about 28 years ago. Her smoking use included cigarettes. She started smoking about 43 years ago. She has a 15 pack-year smoking history. She has never used smokeless tobacco. She reports current alcohol use. She reports that she does not use drugs.  She family history includes Colon cancer in her maternal grandmother; Heart attack in her father; Liver cancer in her paternal uncle; Melanoma in her child.    Review of patient's allergies indicates:  No Known Allergies    Tobacco Use: Medium Risk (7/5/2024)    Patient  "History     Smoking Tobacco Use: Former     Smokeless Tobacco Use: Never     Passive Exposure: Not on file     Physical Examination  General appearance: alert, cooperative, no distress  HEENT: cerumen impaction that improved after irrigation  Neck: no thyromegaly, no neck stiffness  Lungs: clear to auscultation, no wheezes, rales or rhonchi, symmetric air entry  Heart: normal rate, regular rhythm, normal S1, S2, no murmurs, rubs, clicks or gallops  Abdomen: soft, nontender, nondistended, no rigidity, rebound, or guarding.   Back: no point tenderness over spine  Extremities: peripheral pulses normal, no unilateral leg swelling or calf tenderness   Neurological:alert, oriented, normal speech, no new focal findings or movement disorder noted from baseline    BP Readings from Last 3 Encounters:   07/05/24 136/80   05/17/24 (!) 172/79   05/01/24 118/80     Wt Readings from Last 3 Encounters:   07/05/24 90.1 kg (198 lb 9.6 oz)   05/01/24 89.4 kg (197 lb 1.5 oz)   04/10/24 89.4 kg (197 lb)     /80 (BP Location: Left arm, Patient Position: Sitting, BP Method: Large (Manual))   Pulse 81   Resp 18   Ht 5' 6" (1.676 m)   Wt 90.1 kg (198 lb 9.6 oz)   LMP  (LMP Unknown)   SpO2 96%   BMI 32.05 kg/m²    274}  Laboratory: I have reviewed old labs below:    274}    Lab Results   Component Value Date    WBC 3.42 (L) 08/31/2023    HGB 14.2 08/31/2023    HCT 41.8 08/31/2023    MCV 92 08/31/2023     (L) 08/31/2023     08/31/2023    K 4.0 08/31/2023     08/31/2023    CALCIUM 9.0 08/31/2023    CO2 28 08/31/2023     (H) 08/31/2023    BUN 7 (L) 08/31/2023    CREATININE 0.7 05/16/2024    CREATININE 0.7 05/16/2024    EGFRNORACEVR >60.0 05/16/2024    EGFRNORACEVR >60.0 05/16/2024    ANIONGAP 9 08/31/2023    PROT 6.5 08/31/2023    ALBUMIN 3.5 08/31/2023    BILITOT 0.6 08/31/2023    ALKPHOS 79 08/31/2023    ALT 11 08/31/2023    AST 16 08/31/2023    CHOL 242 (H) 05/16/2024    TRIG 128 05/16/2024    HDL 54 " "05/16/2024    LDLCALC 162.4 (H) 05/16/2024    TSH 1.317 08/31/2023      Lab reviewed by me: Particular labs of significance that I will monitor, workup, or treat to improve are mentioned below in diagnostic impression remarks.    Imaging/EKG: I have reviewed the pertinent results and my findings are noted in remarks.  274}    CC:   Chief Complaint   Patient presents with    Otalgia     Clogged eustachian tube possible per patient.         274}    Assessment/Plan  Zuly Person is a 73 y.o. female who presents to clinic with:  1. Bacterial infection    2. Primary malignant neoplasm of female breast    3. Major depressive disorder, recurrent, moderate       274}  Diagnostic Impression Remarks + HPI     Documentation entered by me for this encounter may have been done in part using speech-recognition technology. Although I have made an effort to ensure accuracy, "sound like" errors may exist and should be interpreted in context.     Ear fullness: likely from allergies. Had cerumen impaction. Symptoms persist w/ irrigation. Will send medication and advise restarting allergy medications   Breast cancer: follows up w/ [facility]. Will discuss at f/u  MDD: takes prozac. Stable will discuss at f/u    Additional workup planned: see labs ordered below.    See below for labs and meds ordered with associated diagnosis      1. Bacterial infection  - guaiFENesin (MUCINEX) 600 mg 12 hr tablet; Take 2 tablets (1,200 mg total) by mouth 2 (two) times daily.  Dispense: 14 tablet; Refill: 0  - amoxicillin-clavulanate 875-125mg (AUGMENTIN) 875-125 mg per tablet; Take 1 tablet by mouth 2 (two) times daily. for 10 days  Dispense: 10 tablet; Refill: 0    2. Primary malignant neoplasm of female breast    3. Major depressive disorder, recurrent, moderate      Jackelyn Funes MD   274}    If you are due for any health screening(s) below please notify me so we can arrange them to be ordered and scheduled. Most healthy patients at your age " complete them, but you are free to accept or refuse.     If you can't do it, I'll definitely understand. If you can, I'd certainly appreciate it!   All of your core healthy metrics are met.

## 2024-07-10 ENCOUNTER — OFFICE VISIT (OUTPATIENT)
Dept: CARDIOLOGY | Facility: CLINIC | Age: 74
End: 2024-07-10
Payer: MEDICARE

## 2024-07-10 VITALS
OXYGEN SATURATION: 97 % | BODY MASS INDEX: 31.49 KG/M2 | HEART RATE: 76 BPM | WEIGHT: 195.13 LBS | SYSTOLIC BLOOD PRESSURE: 137 MMHG | DIASTOLIC BLOOD PRESSURE: 82 MMHG

## 2024-07-10 DIAGNOSIS — E78.2 MIXED HYPERLIPIDEMIA: Primary | ICD-10-CM

## 2024-07-10 DIAGNOSIS — I51.89 DIASTOLIC DYSFUNCTION: ICD-10-CM

## 2024-07-10 DIAGNOSIS — G45.9 TIA (TRANSIENT ISCHEMIC ATTACK): ICD-10-CM

## 2024-07-10 DIAGNOSIS — I25.10 ATHEROSCLEROSIS OF NATIVE CORONARY ARTERY OF NATIVE HEART WITHOUT ANGINA PECTORIS: ICD-10-CM

## 2024-07-10 DIAGNOSIS — I51.7 LVH (LEFT VENTRICULAR HYPERTROPHY): ICD-10-CM

## 2024-07-10 DIAGNOSIS — R94.31 ABNORMAL ELECTROCARDIOGRAM (ECG) (EKG): ICD-10-CM

## 2024-07-10 DIAGNOSIS — Z13.6 ENCOUNTER FOR SCREENING FOR CARDIOVASCULAR DISORDERS: ICD-10-CM

## 2024-07-10 PROCEDURE — 99214 OFFICE O/P EST MOD 30 MIN: CPT | Mod: S$PBB,,, | Performed by: GENERAL PRACTICE

## 2024-07-10 PROCEDURE — 99999 PR PBB SHADOW E&M-EST. PATIENT-LVL III: CPT | Mod: PBBFAC,,, | Performed by: GENERAL PRACTICE

## 2024-07-10 PROCEDURE — 99213 OFFICE O/P EST LOW 20 MIN: CPT | Mod: PBBFAC,PN | Performed by: GENERAL PRACTICE

## 2024-07-10 RX ORDER — ASPIRIN 81 MG/1
81 TABLET ORAL DAILY
Qty: 90 TABLET | Refills: 3 | Status: SHIPPED | OUTPATIENT
Start: 2024-07-10 | End: 2025-07-10

## 2024-07-10 RX ORDER — LOSARTAN POTASSIUM 25 MG/1
25 TABLET ORAL DAILY
Qty: 90 TABLET | Refills: 3 | Status: SHIPPED | OUTPATIENT
Start: 2024-07-10 | End: 2025-07-10

## 2024-07-10 RX ORDER — ATORVASTATIN CALCIUM 80 MG/1
80 TABLET, FILM COATED ORAL DAILY
Qty: 90 TABLET | Refills: 3 | Status: SHIPPED | OUTPATIENT
Start: 2024-07-10 | End: 2025-07-10

## 2024-07-10 NOTE — PROGRESS NOTES
"Subjective:    Patient ID:  Zuly Person is a 73 y.o. female who presents for follow-up of   Chief Complaint   Patient presents with    Results     CTA and labs        HPI:       Age: 72 y/o     Gender: F     Weight: 197 pounds     Heart Rate: 56 beats per minute     Blood pressure: 172/79     TECHNIQUE:  CT angiography of the coronary arteries obtained with 90 mL Omnipaque 350.     Retrospective ECG gating was utilized during image acquisition.     3D volume rendered imaging and multi-planar maximum intensity projection reformations were created at a separate workstation under the supervision of the radiologist and stored in the patient's permanent medical record.     Dose of IV metoprolol: 5 mg x 2 doses     Dose of sublingual nitroglycerin: 400 mcg     COMPARISON:  Marked cardial perfusion scan 11/29/2023     FINDINGS:  Image quality: Excellent     Coronary artery calcium score: 7 Agatston method     Left main: 0     LAD: 0     Left circumflex: 0     RCA: 0     PDA: 0     Ramus intermedius: 7     CORONARY ARTERY ANATOMY:     Left Main: Left main coronary artery is a medium-size vessel which arises normally from the left aortic sinus.  It is patent with no plaque or stenosis.     Ramus intermedius: The ramus intermedius is present and contains calcified plaque proximally resulting in 20% stenosis.     Left anterior descending: LAD noncalcified plaque involves a 16 mm segment of the proximal LAD resulting in up to 30% stenosis.  This noncalcified plaque contains "high risk" features, specifically external remodeling, low-attenuation of supply, and "napkin ring sign.".  It gives rise to 2 patent diagonal branches.     Left circumflex: Left circumflex artery contains noncalcified plaque at its origin resulting in 30% stenosis.  Left circumflex artery is otherwise patent and gives rise to 2 patent marginal branches.     Right coronary: Right coronary artery arises normally from the right aortic sinus and is patent " with no plaque or stenosis.  RCA is dominant, giving rise to patent posterior descending artery and patent posterior left ventricular branch.     CARDIAC DIMENSIONS:     EF: 64 %     LV EDV: 128 mL     LV ESV: 46 mL     SV: 83 mL     Cardiac output: 4.6 liters/minute     Left ventricular wall motion: Normal     CARDIAC ANATOMY:     Ventricles: No abnormality identified.     Atria: No abnormality identified.     Cardiac valves: 3 cusp aortic and mitral valves show no thickening or calcification.     Pericardium: Negative for pericardial effusion, thickening, or calcification.     Venous anatomy: Coronary sinus and its tributaries are unremarkable.     EXTRACARDIAC FINDINGS:     Thoracic aorta: Visualized thoracic aorta is of normal caliber.     Pulmonary artery: Visualized pulmonary artery is of normal caliber.     Tiny hiatal hernia incidentally noted.     Impression:     1. 20% stenosis at site of calcified plaque in proximal ramus intermedius.  2. Noncalcified plaque in proximal LAD resulting in 30% stenosis with imaging features characteristic of high risk plaque.  3. Noncalcified plaque at origin of left circumflex resulting in 30% stenosis.  4. Patient's coronary artery calcium score is 7.  This places patient between 25th and 50th percentile for females was between ages of 70 and 74.  This implies minimal identifiable plaque with very unlikely, less than 10%, risk of coronary artery disease.  CAD RADS 2/P1/HRP Mild nonobstructive coronary artery disease.  Plaque: P1 mild.  Modifier HRP: High risk plaque     Management recommendation: Consider non atherosclerotic causes of chest pain.  Risk factor modification and preventive pharmacotherapy.     This report was flagged in Epic as abnormal.        Electronically signed by:Feliz Mittal  Date:                                            05/17/2024  Time:                                           12:10    2/15/24  She comes in today relief for for cardiovascular  evaluation.  She has a strong family history of premature heart disease father  of MI at age 62 grandfather  MI at age 50.  She has not smoked for 26 years has a history of a TIA in the past which she was taking chemotherapy which was questionable she was given a statin and aspirin at that time.  She has a history of breast cancer 2016 HER2 negative was taking Herceptin she got radiation chemotherapy and surgery at that time.  She has breast implants.  She is a nontoxic multinodular goiter she of ankle swelling  She wants to started exercise program and lose weight.  Her weight has gone up as well as her cholesterol recently.  EKG is normal sinus rhythm normal EKG  Summary  Show Result Comparison       Left Ventricle: The left ventricle is normal in size. Moderately increased wall thickness. There is concentric hypertrophy. Normal wall motion. There is normal systolic function with a visually estimated ejection fraction of 60 - 65%. There is diastolic dysfunction. E/A ratio is 0.73.    Right Ventricle: Normal right ventricular cavity size. Wall thickness is normal. Right ventricle wall motion  is normal. Systolic function is normal.    Tricuspid Valve: There is mild regurgitation.    IVC/SVC: Normal venous pressure at 3 mmHg.      Equivocal myocardial perfusion scan.    There is a mild intensity, small to moderate sized, mostly reversible perfusion abnormality with some fixed areas in the lateral wall(s) in the typical distribution of the LCX territory.    There are no other significant perfusion abnormalities.    The gated perfusion images showed an ejection fraction of 72% at rest. The gated perfusion images showed an ejection fraction of 76% post stress. Normal ejection fraction is greater than 53%.    There is normal wall motion at rest and post stress.    LV cavity size is normal at rest.    The ECG portion of the study is negative for ischemia.    The patient reported no chest pain during the stress  test.    During stress, rare PVCs are noted.    The patient exercised for 3 minutes 53 seconds on a Miguel protocol, corresponding to a functional capacity of 7 METS, achieving a peak heart rate of 139 bpm, which is 98 % of the age predicted maximum heart rate.    TID 0.88    Mild lateral fixed hyper for fusion about 3% of the circumflex area with total myocardial reversibility about 3% which is probably not clinically significant.  Clinical correlation suggested    Recommend routine screening cardiovascular examination.  Her EKG is normal.  Recommend is exercise stress test with nuclear.  Recommend echocardiogram.  Last chest x-ray was 2018.  She has elevated cholesterol and says she still has some Lipitor the past although most likely is outdated she does not mind try take it again  I am going to represcribe it as is probably outdated.  She is recommended to eat Mediterranean diet and lifestyle with small portions.  Will give her clearance to exercise.  Will discuss possible aspirin u  5/16/24 0752 LDLCALC 162.4 Important  High Final result   05/16/24 0752 CHOLHDL 22.3 -- Final result   05/16/24 0752 NONHDLCHOL 188 -- Final result   05/16/24 0752 TOTALCHOLEST 4.5 -- Final resul   manav in next F/          Review of patient's allergies indicates:  No Known Allergies    Past Medical History:   Diagnosis Date    Anxiety     Basal cell carcinoma     Cancer     breast cancer, dx 2015    Colon polyp     GERD (gastroesophageal reflux disease)      Past Surgical History:   Procedure Laterality Date    APPENDECTOMY      BREAST SURGERY      implants    CHOLECYSTECTOMY      COLONOSCOPY      ESOPHAGOGASTRODUODENOSCOPY N/A 1/11/2024    Procedure: EGD (ESOPHAGOGASTRODUODENOSCOPY);  Surgeon: Ellen Timmons MD;  Location: [facility];  Service: Endoscopy;  Laterality: N/A;    HYSTERECTOMY      MASTECTOMY      UPPER GASTROINTESTINAL ENDOSCOPY      ABOUT 20 YEARS AGO     Social History     Tobacco Use    Smoking status: Former      Current packs/day: 0.00     Average packs/day: 1 pack/day for 15.0 years (15.0 ttl pk-yrs)     Types: Cigarettes     Start date:      Quit date:      Years since quittin.5    Smokeless tobacco: Never   Substance Use Topics    Alcohol use: Yes     Comment: daily bourbon coke 2-3 cups    Drug use: No     Family History   Problem Relation Name Age of Onset    Heart attack Father      Liver cancer Paternal Uncle      Colon cancer Maternal Grandmother      Melanoma Child      Esophageal cancer Neg Hx      Crohn's disease Neg Hx      Rectal cancer Neg Hx      Stomach cancer Neg Hx      Ulcerative colitis Neg Hx          Review of Systems:   Constitution: Negative for diaphoresis and fever.   HEENT: Negative for nosebleeds.    Cardiovascular: Negative for chest pain       No dyspnea on exertion       No leg swelling        No palpitations  Respiratory: Negative for shortness of breath and wheezing.    Hematologic/Lymphatic: Negative for bleeding problem. Does not bruise/bleed easily.   Skin: Negative for color change and rash.   Musculoskeletal: Negative for falls and myalgias.   Gastrointestinal: Negative for hematemesis and hematochezia.   Genitourinary: Negative for hematuria.   Neurological: Negative for dizziness and light-headedness.   Psychiatric/Behavioral: Negative for altered mental status and memory loss.          Objective:        Vitals:    07/10/24 1426   BP: 137/82   Pulse: 76       Lab Results   Component Value Date    WBC 3.42 (L) 2023    HGB 14.2 2023    HCT 41.8 2023     (L) 2023    CHOL 242 (H) 2024    TRIG 128 2024    HDL 54 2024    ALT 11 2023    AST 16 2023     2023    K 4.0 2023     2023    CREATININE 0.7 2024    CREATININE 0.7 2024    BUN 7 (L) 2023    CO2 28 2023    TSH 1.317 2023        ECHOCARDIOGRAM RESULTS  Results for orders placed during the hospital encounter  of 11/29/23    Echo Saline Bubble? No    Interpretation Summary    Left Ventricle: The left ventricle is normal in size. Moderately increased wall thickness. There is concentric hypertrophy. Normal wall motion. There is normal systolic function with a visually estimated ejection fraction of 60 - 65%. There is diastolic dysfunction. E/A ratio is 0.73.    Right Ventricle: Normal right ventricular cavity size. Wall thickness is normal. Right ventricle wall motion  is normal. Systolic function is normal.    Tricuspid Valve: There is mild regurgitation.    IVC/SVC: Normal venous pressure at 3 mmHg.        CURRENT/PREVIOUS VISIT EKG  Results for orders placed or performed in visit on 10/30/23   IN OFFICE EKG 12-LEAD (to [place])    Collection Time: 10/30/23  2:03 PM    Narrative    Test Reason : Z82.49,    Vent. Rate : 076 BPM     Atrial Rate : 076 BPM     P-R Int : 172 ms          QRS Dur : 092 ms      QT Int : 402 ms       P-R-T Axes : 049 042 046 degrees     QTc Int : 452 ms    Normal sinus rhythm  Normal ECG  When compared with ECG of 18-DEC-2019 16:20,  No significant change was found  Confirmed by Julissa FALLON, Jeff THOMPSON ([id number]) on 12/2/2023 12:21:36 PM    Referred By: PABLO MCGOWAN           Confirmed By:Jeff Costa MD     No valid procedures specified.   Results for orders placed during the hospital encounter of 11/29/23    Nuclear Stress - Cardiology Interpreted    Interpretation Summary    Equivocal myocardial perfusion scan.    There is a mild intensity, small to moderate sized, mostly reversible perfusion abnormality with some fixed areas in the lateral wall(s) in the typical distribution of the LCX territory.    There are no other significant perfusion abnormalities.    The gated perfusion images showed an ejection fraction of 72% at rest. The gated perfusion images showed an ejection fraction of 76% post stress. Normal ejection fraction is greater than 53%.    There is normal wall motion at rest and post  stress.    LV cavity size is normal at rest.    The ECG portion of the study is negative for ischemia.    The patient reported no chest pain during the stress test.    During stress, rare PVCs are noted.    The patient exercised for 3 minutes 53 seconds on a Miguel protocol, corresponding to a functional capacity of 7 METS, achieving a peak heart rate of 139 bpm, which is 98 % of the age predicted maximum heart rate.    TID 0.88    Mild lateral fixed hyper for fusion about 3% of the circumflex area with total myocardial reversibility about 3% which is probably not clinically significant.  Clinical correlation suggested      Physical Exam:  CONSTITUTIONAL: No fever, no chills  HEENT: Normocephalic, atraumatic,pupils reactive to light                 NECK:  No JVD no carotid bruit  CVS: S1S2+, RRR, no murmurs,   LUNGS: Clear  ABDOMEN: Soft, NT, BS+  EXTREMITIES: No cyanosis, edema  : No huggins catheter  NEURO: AAO X 3  PSY: Normal affect      Medication List with Changes/Refills   Current Medications    AMOXICILLIN-CLAVULANATE 875-125MG (AUGMENTIN) 875-125 MG PER TABLET    Take 1 tablet by mouth 2 (two) times daily. for 10 days    ATORVASTATIN (LIPITOR) 20 MG TABLET    Take 1 tablet (20 mg total) by mouth once daily.    CHLORPHENIRAMINE (CHLOR-TRIMETON) 2 MG/5 ML SYRUP    Take 2 mg by mouth every 4 (four) hours as needed for Allergies.    DICLOFENAC (VOLTAREN) 75 MG EC TABLET    Take 1 tablet (75 mg total) by mouth 2 (two) times daily.    FLUOXETINE 40 MG CAPSULE    Take 1 capsule (40 mg total) by mouth once daily.    FLUTICASONE PROPIONATE (FLONASE) 50 MCG/ACTUATION NASAL SPRAY    2 sprays (100 mcg total) by Each Nostril route once daily.    GUAIFENESIN (MUCINEX) 600 MG 12 HR TABLET    Take 2 tablets (1,200 mg total) by mouth 2 (two) times daily.    MONTELUKAST (SINGULAIR) 10 MG TABLET    Take 1 tablet (10 mg total) by mouth every evening.    OMEPRAZOLE (PRILOSEC) 40 MG CAPSULE    Take 1 capsule (40 mg total) by  mouth once daily.   Discontinued Medications    METOPROLOL TARTRATE (LOPRESSOR) 25 MG TABLET    Take 1 tablet (25 mg total) by mouth On call Procedure (TAKE 2 ON CALL AND KEEP TWO IN POCKET).             Assessment:       1. Mixed hyperlipidemia    2. Atherosclerosis of native coronary artery of native heart without angina pectoris    3. TIA (transient ischemic attack)    4. Encounter for screening for cardiovascular disorders    5. Abnormal electrocardiogram (ECG) (EKG)    6. LVH (left ventricular hypertrophy)    7. Diastolic dysfunction         Plan:     Problem List Items Addressed This Visit          Neuro    TIA (transient ischemic attack)    Overview     Last Assessment & Plan:   ASSESSMENT:    The patient's condition is improved.    PLAN:    ccc  On aspirin   statins          Other Visit Diagnoses       Mixed hyperlipidemia    -  Primary    Atherosclerosis of native coronary artery of native heart without angina pectoris        Encounter for screening for cardiovascular disorders        Abnormal electrocardiogram (ECG) (EKG)        LVH (left ventricular hypertrophy)        Diastolic dysfunction            The CTA of the heart results were excellent with less than 10% chance of myocardial infarction with calcium score 7.  She did have some calcified plaque which is noncalcified and had some high-risk features with external remodeling  ring sign and therefore she was recommended to of continue risk factor modification with cholesterol lowering and taking an aspirin a day.    He was recently started on Lipitor for TIA and aspirin which she has not taking but will go back to taking these    Echo reveals LVH and diastolic dysfunction so recommend blood pressure and weight control  Hypertension her blood pressure is running in the office above goal on 20/78 so was started on low-dose of losartan 25 mg and recommend home blood pressure monitoring      Follow up in about 6 months (around 1/10/2025).    The  patients questions were answered, they verbalized understanding, and agreed with the treatment plan.     JEREMIE GARCIA MD  [facility]

## 2024-07-16 ENCOUNTER — OFFICE VISIT (OUTPATIENT)
Dept: FAMILY MEDICINE | Facility: CLINIC | Age: 74
End: 2024-07-16
Payer: MEDICARE

## 2024-07-16 VITALS
HEIGHT: 66 IN | WEIGHT: 192.81 LBS | SYSTOLIC BLOOD PRESSURE: 120 MMHG | DIASTOLIC BLOOD PRESSURE: 88 MMHG | HEART RATE: 88 BPM | OXYGEN SATURATION: 96 % | BODY MASS INDEX: 30.99 KG/M2

## 2024-07-16 DIAGNOSIS — A49.9 BACTERIAL INFECTION: Primary | ICD-10-CM

## 2024-07-16 PROCEDURE — 99214 OFFICE O/P EST MOD 30 MIN: CPT | Mod: S$PBB,,, | Performed by: STUDENT IN AN ORGANIZED HEALTH CARE EDUCATION/TRAINING PROGRAM

## 2024-07-16 PROCEDURE — 99999 PR PBB SHADOW E&M-EST. PATIENT-LVL III: CPT | Mod: PBBFAC,,, | Performed by: STUDENT IN AN ORGANIZED HEALTH CARE EDUCATION/TRAINING PROGRAM

## 2024-07-16 PROCEDURE — 96372 THER/PROPH/DIAG INJ SC/IM: CPT | Mod: PBBFAC

## 2024-07-16 PROCEDURE — 99213 OFFICE O/P EST LOW 20 MIN: CPT | Mod: PBBFAC | Performed by: STUDENT IN AN ORGANIZED HEALTH CARE EDUCATION/TRAINING PROGRAM

## 2024-07-16 PROCEDURE — 99999PBSHW PR PBB SHADOW TECHNICAL ONLY FILED TO HB: Mod: PBBFAC,,,

## 2024-07-16 RX ORDER — BENZONATATE 200 MG/1
200 CAPSULE ORAL 3 TIMES DAILY PRN
Qty: 20 CAPSULE | Refills: 0 | Status: SHIPPED | OUTPATIENT
Start: 2024-07-16 | End: 2024-07-31

## 2024-07-16 RX ORDER — GUAIFENESIN AND DEXTROMETHORPHAN HYDROBROMIDE 10; 100 MG/5ML; MG/5ML
5 SYRUP ORAL EVERY 6 HOURS PRN
COMMUNITY
Start: 2024-07-16 | End: 2024-07-26

## 2024-07-16 RX ORDER — TRIAMCINOLONE ACETONIDE 40 MG/ML
40 INJECTION, SUSPENSION INTRA-ARTICULAR; INTRAMUSCULAR
Status: COMPLETED | OUTPATIENT
Start: 2024-07-16 | End: 2024-07-16

## 2024-07-16 RX ORDER — AZITHROMYCIN 250 MG/1
TABLET, FILM COATED ORAL
Qty: 6 TABLET | Refills: 0 | Status: SHIPPED | OUTPATIENT
Start: 2024-07-16 | End: 2024-07-21

## 2024-07-16 RX ORDER — LEVOCETIRIZINE DIHYDROCHLORIDE 5 MG/1
5 TABLET, FILM COATED ORAL NIGHTLY
Qty: 30 TABLET | Refills: 11 | Status: SHIPPED | OUTPATIENT
Start: 2024-07-16 | End: 2025-07-16

## 2024-07-16 RX ORDER — AZELASTINE 1 MG/ML
1 SPRAY, METERED NASAL 2 TIMES DAILY
Qty: 18.2 ML | Refills: 0 | Status: SHIPPED | OUTPATIENT
Start: 2024-07-16 | End: 2025-07-16

## 2024-07-16 RX ORDER — ALBUTEROL SULFATE 0.83 MG/ML
2.5 SOLUTION RESPIRATORY (INHALATION) EVERY 6 HOURS PRN
Qty: 60 EACH | Refills: 0 | Status: SHIPPED | OUTPATIENT
Start: 2024-07-16 | End: 2025-07-16

## 2024-07-16 RX ADMIN — TRIAMCINOLONE ACETONIDE 40 MG: 40 INJECTION, SUSPENSION INTRA-ARTICULAR; INTRAMUSCULAR at 11:07

## 2024-07-16 NOTE — PROGRESS NOTES
SUBJECTIVE:    CHIEF COMPLAINT:   Chief Complaint   Patient presents with    Sore Throat     Started Saturday- sore throat, coughing, mucus production           274}    HISTORY OF PRESENT ILLNESS:  Zuly Person is a 73 y.o. female who presents to the clinic today for myalgias, coughing, chest and sinus congestion, sore throat since Saturday. Denies any fever. Patient was dx'ed asthma previously and admits to having intermittent wheezing but denies any shortness of breath. Patient denies any COVID and flu exposures      PAST MEDICAL HISTORY:     274}  Past Medical History:   Diagnosis Date    Anxiety     Basal cell carcinoma     Cancer     breast cancer, dx 2015    Colon polyp     GERD (gastroesophageal reflux disease)        PAST SURGICAL HISTORY:  Past Surgical History:   Procedure Laterality Date    APPENDECTOMY      BREAST SURGERY      implants    CHOLECYSTECTOMY      COLONOSCOPY      ESOPHAGOGASTRODUODENOSCOPY N/A 2024    Procedure: EGD (ESOPHAGOGASTRODUODENOSCOPY);  Surgeon: Ellen Timmons MD;  Location: [facility];  Service: Endoscopy;  Laterality: N/A;    HYSTERECTOMY      MASTECTOMY      UPPER GASTROINTESTINAL ENDOSCOPY      ABOUT 20 YEARS AGO       SOCIAL HISTORY:  Social History     Socioeconomic History    Marital status:    Tobacco Use    Smoking status: Former     Current packs/day: 0.00     Average packs/day: 1 pack/day for 15.0 years (15.0 ttl pk-yrs)     Types: Cigarettes     Start date:      Quit date:      Years since quittin.5    Smokeless tobacco: Never   Substance and Sexual Activity    Alcohol use: Yes     Comment: daily bourbon coke 2-3 cups    Drug use: No    Sexual activity: Never       FAMILY HISTORY:       Family History   Problem Relation Name Age of Onset    Heart attack Father      Liver cancer Paternal Uncle      Colon cancer Maternal Grandmother      Melanoma Child      Esophageal cancer Neg Hx      Crohn's disease Neg Hx      Rectal cancer Neg Hx       Stomach cancer Neg Hx      Ulcerative colitis Neg Hx         ALLERGIES AND MEDICATIONS: updated and reviewed.      274}  Review of patient's allergies indicates:  No Known Allergies  Medication List with Changes/Refills   New Medications    ALBUTEROL (PROVENTIL) 2.5 MG /3 ML (0.083 %) NEBULIZER SOLUTION    Take 3 mLs (2.5 mg total) by nebulization every 6 (six) hours as needed for Wheezing. Rescue    AZELASTINE (ASTELIN) 137 MCG (0.1 %) NASAL SPRAY    1 spray (137 mcg total) by Nasal route 2 (two) times daily.    AZITHROMYCIN (Z-PENG) 250 MG TABLET    Take 2 tablets by mouth on day 1; Take 1 tablet by mouth on days 2-5    BENZONATATE (TESSALON) 200 MG CAPSULE    Take 1 capsule (200 mg total) by mouth 3 (three) times daily as needed for Cough.    DEXTROMETHORPHAN-GUAIFENESIN  MG/5 ML (ROBITUSSIN-DM)  MG/5 ML LIQUID    Take 5 mLs by mouth every 6 (six) hours as needed (cough).    LEVOCETIRIZINE (XYZAL) 5 MG TABLET    Take 1 tablet (5 mg total) by mouth every evening.   Current Medications    ASPIRIN (ECOTRIN) 81 MG EC TABLET    Take 1 tablet (81 mg total) by mouth once daily.    ATORVASTATIN (LIPITOR) 80 MG TABLET    Take 1 tablet (80 mg total) by mouth once daily.    CHLORPHENIRAMINE (CHLOR-TRIMETON) 2 MG/5 ML SYRUP    Take 2 mg by mouth every 4 (four) hours as needed for Allergies.    DICLOFENAC (VOLTAREN) 75 MG EC TABLET    Take 1 tablet (75 mg total) by mouth 2 (two) times daily.    FLUOXETINE 40 MG CAPSULE    Take 1 capsule (40 mg total) by mouth once daily.    FLUTICASONE PROPIONATE (FLONASE) 50 MCG/ACTUATION NASAL SPRAY    2 sprays (100 mcg total) by Each Nostril route once daily.    GUAIFENESIN (MUCINEX) 600 MG 12 HR TABLET    Take 2 tablets (1,200 mg total) by mouth 2 (two) times daily.    LOSARTAN (COZAAR) 25 MG TABLET    Take 1 tablet (25 mg total) by mouth once daily.    MONTELUKAST (SINGULAIR) 10 MG TABLET    Take 1 tablet (10 mg total) by mouth every evening.    OMEPRAZOLE (PRILOSEC) 40 MG  "CAPSULE    Take 1 capsule (40 mg total) by mouth once daily.       SCREENING HISTORY:    274}  Health Maintenance         Date Due Completion Date    COVID-19 Vaccine (1) Never done ---    Pneumococcal Vaccines (Age 65+) (1 of 2 - PCV) Never done ---    Shingles Vaccine (1 of 2) Never done ---    RSV Vaccine (Age 60+ and Pregnant patients) (1 - 1-dose 60+ series) Never done ---    Influenza Vaccine (1) 09/01/2024 ---    Lipid Panel 05/16/2025 5/16/2024    High Dose Statin 07/10/2025 7/10/2024    Aspirin/Antiplatelet Therapy 07/10/2025 7/10/2024    DEXA Scan 09/01/2026 9/1/2023    Colorectal Cancer Screening 06/18/2029 6/18/2019    TETANUS VACCINE 06/11/2030 6/11/2020            REVIEW OF SYSTEMS:   Review of Systems   Constitutional:  Negative for chills, fever and weight loss.   HENT:  Positive for congestion and sore throat. Negative for ear discharge and ear pain.    Respiratory:  Positive for cough and wheezing. Negative for shortness of breath.    Cardiovascular:  Negative for chest pain, palpitations and leg swelling.   Gastrointestinal:  Negative for abdominal pain, blood in stool, constipation, diarrhea, heartburn, nausea and vomiting.   Musculoskeletal:  Negative for back pain, joint pain and myalgias.   Psychiatric/Behavioral:  Negative for depression. The patient is not nervous/anxious.        PHYSICAL EXAM:      274}  /88 (BP Location: Left arm, Patient Position: Sitting, BP Method: Large (Manual))   Pulse 88   Ht 5' 6" (1.676 m)   Wt 87.5 kg (192 lb 12.8 oz)   LMP  (LMP Unknown)   SpO2 96%   BMI 31.12 kg/m²   Wt Readings from Last 3 Encounters:   07/16/24 87.5 kg (192 lb 12.8 oz)   07/10/24 88.5 kg (195 lb 1.7 oz)   07/05/24 90.1 kg (198 lb 9.6 oz)     BP Readings from Last 3 Encounters:   07/16/24 120/88   07/10/24 137/82   07/05/24 136/80     Estimated body mass index is 31.12 kg/m² as calculated from the following:    Height as of this encounter: 5' 6" (1.676 m).    Weight as of this " encounter: 87.5 kg (192 lb 12.8 oz).     Physical Exam  Constitutional:       Appearance: Normal appearance.   HENT:      Head: Normocephalic and atraumatic.      Nose: Congestion present.      Mouth/Throat:      Comments: Erythematous oropharynx   Cardiovascular:      Rate and Rhythm: Normal rate and regular rhythm.      Pulses: Normal pulses.      Heart sounds: Normal heart sounds.   Pulmonary:      Effort: Pulmonary effort is normal.      Breath sounds: Normal breath sounds.   Abdominal:      General: There is no distension.      Tenderness: There is no guarding.   Musculoskeletal:         General: Normal range of motion.      Cervical back: Normal range of motion.   Skin:     General: Skin is warm and dry.   Neurological:      General: No focal deficit present.      Mental Status: She is alert. Mental status is at baseline.   Psychiatric:         Mood and Affect: Mood normal.         Thought Content: Thought content normal.         Judgment: Judgment normal.         LABS:   274}  I have reviewed old labs below:  Lab Results   Component Value Date    WBC 3.42 (L) 08/31/2023    HGB 14.2 08/31/2023    HCT 41.8 08/31/2023    MCV 92 08/31/2023     (L) 08/31/2023     08/31/2023    K 4.0 08/31/2023     08/31/2023    CALCIUM 9.0 08/31/2023    CO2 28 08/31/2023     (H) 08/31/2023    BUN 7 (L) 08/31/2023    CREATININE 0.7 05/16/2024    CREATININE 0.7 05/16/2024    ANIONGAP 9 08/31/2023    ESTGFRAFRICA >60.0 03/12/2021    EGFRNONAA >60.0 03/12/2021    PROT 6.5 08/31/2023    ALBUMIN 3.5 08/31/2023    BILITOT 0.6 08/31/2023    ALKPHOS 79 08/31/2023    ALT 11 08/31/2023    AST 16 08/31/2023    CHOL 242 (H) 05/16/2024    TRIG 128 05/16/2024    HDL 54 05/16/2024    LDLCALC 162.4 (H) 05/16/2024    TSH 1.317 08/31/2023       ASSESSMENT AND PLAN:  274}  1. Bacterial infection  Will treat empirically. Patient request injection d/t having guest over and needs to get better soon.  -  dextromethorphan-guaiFENesin  mg/5 ml (ROBITUSSIN-DM)  mg/5 mL liquid; Take 5 mLs by mouth every 6 (six) hours as needed (cough).  - levocetirizine (XYZAL) 5 MG tablet; Take 1 tablet (5 mg total) by mouth every evening.  Dispense: 30 tablet; Refill: 11  - benzonatate (TESSALON) 200 MG capsule; Take 1 capsule (200 mg total) by mouth 3 (three) times daily as needed for Cough.  Dispense: 20 capsule; Refill: 0  - POCT Influenza A/B Molecular  - albuterol (PROVENTIL) 2.5 mg /3 mL (0.083 %) nebulizer solution; Take 3 mLs (2.5 mg total) by nebulization every 6 (six) hours as needed for Wheezing. Rescue  Dispense: 60 each; Refill: 0  - triamcinolone acetonide injection 40 mg  - azelastine (ASTELIN) 137 mcg (0.1 %) nasal spray; 1 spray (137 mcg total) by Nasal route 2 (two) times daily.  Dispense: 18.2 mL; Refill: 0  - POCT COVID-19 Rapid Screening  - azithromycin (Z-PENG) 250 MG tablet; Take 2 tablets by mouth on day 1; Take 1 tablet by mouth on days 2-5  Dispense: 6 tablet; Refill: 0         Orders Placed This Encounter   Procedures    POCT Influenza A/B Molecular    POCT COVID-19 Rapid Screening       No follow-ups on file. or sooner as needed.

## 2024-07-17 ENCOUNTER — TELEPHONE (OUTPATIENT)
Dept: FAMILY MEDICINE | Facility: CLINIC | Age: 74
End: 2024-07-17
Payer: MEDICARE

## 2024-07-17 DIAGNOSIS — A49.9 BACTERIAL INFECTION: ICD-10-CM

## 2024-07-17 NOTE — TELEPHONE ENCOUNTER
----- Message from Jenifer Tafoya sent at 7/17/2024  2:48 PM CDT -----  Contact: PT  Type:  RX Refill Request    Who Called: PT   Refill or New Rx: NEW   RX Name and Strength: CODEINE COUGH SYRUP    How is the patient currently taking it? (ex. 1XDay): AS DIRECTED   Is this a 30 day or 90 day RX: 30  Preferred Pharmacy with phone number:   [id number] [place] [address] AT [facility] [address]  [address] [place] [id number]  Phone: [phone] Fax: [phone]  Local or Mail Order: LOCAL  Ordering Provider: VERONICA   Would the patient rather a call back or a response via [facility]? CALL   Best Call Back Number: [phone] (home)   Additional Information: THANK YOU

## 2024-08-20 ENCOUNTER — TELEPHONE (OUTPATIENT)
Facility: CLINIC | Age: 74
End: 2024-08-20
Payer: MEDICARE

## 2024-08-20 NOTE — TELEPHONE ENCOUNTER
Advised patient that we currently do not have any new patient appointments available at the [facility]. Encouraged patient to reach out to appointment desk for scheduling at alternate campus.

## 2024-08-20 NOTE — TELEPHONE ENCOUNTER
----- Message from Tila Correa sent at 8/19/2024  3:39 PM CDT -----  Contact: self  Type:  Sooner Appointment Request    Name of Caller: Pt   When is the first available appointment? N/A  Symptoms: Cyst on Back / pt was last seen 6/17/2020  Would the patient rather a call back or a response via [facility]? call  Best Call Back Number: [phone]  Please call to advise... Thank you...

## 2024-08-26 ENCOUNTER — HOSPITAL ENCOUNTER (OUTPATIENT)
Dept: RADIOLOGY | Facility: HOSPITAL | Age: 74
Discharge: HOME | End: 2024-08-26
Attending: CHIROPRACTOR
Payer: MEDICARE

## 2024-08-26 DIAGNOSIS — M54.6 THORACIC BACK PAIN: ICD-10-CM

## 2024-08-26 DIAGNOSIS — M54.2 CERVICAL PAIN: ICD-10-CM

## 2024-10-18 ENCOUNTER — OFFICE VISIT (OUTPATIENT)
Dept: FAMILY MEDICINE | Facility: CLINIC | Age: 74
End: 2024-10-18
Payer: MEDICARE

## 2024-10-18 VITALS
SYSTOLIC BLOOD PRESSURE: 137 MMHG | HEIGHT: 66 IN | RESPIRATION RATE: 16 BRPM | OXYGEN SATURATION: 98 % | HEART RATE: 77 BPM | DIASTOLIC BLOOD PRESSURE: 82 MMHG | BODY MASS INDEX: 31.84 KG/M2 | WEIGHT: 198.13 LBS

## 2024-10-18 DIAGNOSIS — T81.9XXA ABNORMAL SURGICAL WOUND, INITIAL ENCOUNTER: Primary | ICD-10-CM

## 2024-10-18 PROCEDURE — 99999 PR PBB SHADOW E&M-EST. PATIENT-LVL IV: CPT | Mod: PBBFAC,,, | Performed by: STUDENT IN AN ORGANIZED HEALTH CARE EDUCATION/TRAINING PROGRAM

## 2024-10-18 PROCEDURE — 99214 OFFICE O/P EST MOD 30 MIN: CPT | Mod: PBBFAC,PN | Performed by: STUDENT IN AN ORGANIZED HEALTH CARE EDUCATION/TRAINING PROGRAM

## 2024-10-18 RX ORDER — MUPIROCIN 20 MG/G
OINTMENT TOPICAL 3 TIMES DAILY
Qty: 30 G | Refills: 0 | Status: SHIPPED | OUTPATIENT
Start: 2024-10-18

## 2024-10-18 NOTE — PROGRESS NOTES
Subjective:       Patient ID: Zuly Person is a 74 y.o. female.    Chief Complaint: Cyst (Follow up after removal to make sure its not infected)    Ms Person has had a cyst removed from her low back by dermatology  She got stitches out and has been doing well  Her  noted some increased drainage and some mild increase in tenderness.  She is here to be evalauted.  No fevers.        .  Patient Active Problem List   Diagnosis    Gastroesophageal reflux disease    Mild intermittent asthma with acute exacerbation    Chronic cough    Chronic sinus complaints    Neuroma    Pes cavus, congenital    Thoracic back pain    History of breast cancer    Allergic rhinitis    Acquired absence of breast and nipple    History of malignant neoplasm of skin    Major depressive disorder, recurrent, moderate    Murmur    Nontoxic multinodular goiter    Other specified hypothyroidism    TIA (transient ischemic attack)    Foot pain, right    Osteoarthritis of ankle and foot    Primary malignant neoplasm of female breast     Zuly has a current medication list which includes the following prescription(s): fluoxetine, omeprazole, albuterol, aspirin, atorvastatin, azelastine, chlorpheniramine, diclofenac, fluticasone propionate, guaifenesin, levocetirizine, losartan, montelukast, and mupirocin.    Review of Systems   Constitutional:  Negative for activity change and appetite change.   Respiratory:  Negative for shortness of breath.    Cardiovascular:  Negative for chest pain.   Gastrointestinal:  Negative for abdominal pain.   Genitourinary:  Negative for dysuria.   Integumentary:  Positive for wound. Negative for rash.   Psychiatric/Behavioral:  Negative for sleep disturbance.          Health Maintenance Due   Topic Date Due    COVID-19 Vaccine (1) Never done    Pneumococcal Vaccines (Age 65+) (1 of 2 - PCV) Never done    Shingles Vaccine (1 of 2) Never done    RSV Vaccine (Age 60+ and Pregnant patients) (1 - Risk 60-74 years 1-dose  series) Never done    Influenza Vaccine (1) Never done      Health Maintenance reviewed and discussed- encouraged vaccines.     Objective:      Physical Exam  Constitutional:       General: She is not in acute distress.     Appearance: Normal appearance. She is not ill-appearing.   Eyes:      Conjunctiva/sclera: Conjunctivae normal.   Skin:     General: Skin is warm and dry.          Neurological:      Mental Status: She is alert. Mental status is at baseline.      Gait: Gait normal.   Psychiatric:         Mood and Affect: Mood normal.         Behavior: Behavior normal.         Thought Content: Thought content normal.         Judgment: Judgment normal.           Assessment:       1. Abnormal surgical wound, initial encounter        Plan:       1. Abnormal surgical wound, initial encounter  Comments:  clean with warm moist towel. this may help unroof possible stitch abscess.  STart mupirocin and monitor next week for need for other meds  Orders:  -     mupirocin (BACTROBAN) 2 % ointment; Apply topically 3 (three) times daily.  Dispense: 30 g; Refill: 0

## 2024-10-21 ENCOUNTER — TELEPHONE (OUTPATIENT)
Dept: FAMILY MEDICINE | Facility: CLINIC | Age: 74
End: 2024-10-21
Payer: MEDICARE

## 2024-10-21 NOTE — TELEPHONE ENCOUNTER
----- Message from Brittany sent at 10/21/2024  9:56 AM CDT -----  Type:  Sooner Appointment Request    Caller is requesting a sooner appointment.  Caller declined first available appointment listed below.  Caller will not accept being placed on the waitlist and is requesting a message be sent to doctor.    Name of Caller:  pt   When is the first available appointment?  Nov 27   Symptoms:  wound f/u   Would the patient rather a call back or a response via [facility]? Call   Best Call Back Number:  [phone] (home)     Additional Information:  pt needs to be seen in 1 week please advise

## 2024-10-21 NOTE — TELEPHONE ENCOUNTER
----- Message from Pam sent at 10/21/2024 10:22 AM CDT -----  Type:  Patient Returning Call    Who Called:  pt  Who Left Message for Patient:  courtney solorzano  Does the patient know what this is regarding?:  yes, f/u wound care   Best Call Back Number:  [phone]  Additional Information:  Please call back to advise. Thanks.

## 2024-10-21 NOTE — TELEPHONE ENCOUNTER
Attempted to contact Zuly Person to discuss Scheduling an appointment.    Left voice mail to return our call at [phone] on [phone] (home).    Fabiana Kilgore LPN

## 2024-10-28 ENCOUNTER — OFFICE VISIT (OUTPATIENT)
Dept: FAMILY MEDICINE | Facility: CLINIC | Age: 74
End: 2024-10-28
Payer: MEDICARE

## 2024-10-28 VITALS
HEIGHT: 66 IN | HEART RATE: 75 BPM | BODY MASS INDEX: 31.82 KG/M2 | RESPIRATION RATE: 16 BRPM | DIASTOLIC BLOOD PRESSURE: 90 MMHG | SYSTOLIC BLOOD PRESSURE: 150 MMHG | WEIGHT: 198 LBS | OXYGEN SATURATION: 95 %

## 2024-10-28 DIAGNOSIS — R03.0 ELEVATED BLOOD PRESSURE READING: ICD-10-CM

## 2024-10-28 DIAGNOSIS — G45.9 TIA (TRANSIENT ISCHEMIC ATTACK): ICD-10-CM

## 2024-10-28 DIAGNOSIS — F33.1 MAJOR DEPRESSIVE DISORDER, RECURRENT, MODERATE: ICD-10-CM

## 2024-10-28 DIAGNOSIS — T81.9XXA ABNORMAL SURGICAL WOUND, INITIAL ENCOUNTER: Primary | ICD-10-CM

## 2024-10-28 DIAGNOSIS — I10 PRIMARY HYPERTENSION: ICD-10-CM

## 2024-10-28 PROCEDURE — 99213 OFFICE O/P EST LOW 20 MIN: CPT | Mod: PBBFAC,PN | Performed by: STUDENT IN AN ORGANIZED HEALTH CARE EDUCATION/TRAINING PROGRAM

## 2024-10-28 PROCEDURE — 99999 PR PBB SHADOW E&M-EST. PATIENT-LVL III: CPT | Mod: PBBFAC,,, | Performed by: STUDENT IN AN ORGANIZED HEALTH CARE EDUCATION/TRAINING PROGRAM

## 2024-10-28 PROCEDURE — 99214 OFFICE O/P EST MOD 30 MIN: CPT | Mod: S$PBB,,, | Performed by: STUDENT IN AN ORGANIZED HEALTH CARE EDUCATION/TRAINING PROGRAM

## 2024-10-28 RX ORDER — ASPIRIN 81 MG/1
81 TABLET ORAL DAILY
Qty: 90 TABLET | Refills: 3 | Status: SHIPPED | OUTPATIENT
Start: 2024-10-28 | End: 2025-10-28

## 2024-10-28 RX ORDER — LOSARTAN POTASSIUM 25 MG/1
25 TABLET ORAL DAILY
Qty: 90 TABLET | Refills: 3 | Status: SHIPPED | OUTPATIENT
Start: 2024-10-28 | End: 2025-10-28

## 2024-10-28 RX ORDER — ATORVASTATIN CALCIUM 80 MG/1
80 TABLET, FILM COATED ORAL DAILY
Qty: 90 TABLET | Refills: 3 | Status: SHIPPED | OUTPATIENT
Start: 2024-10-28 | End: 2025-10-28

## 2024-12-26 ENCOUNTER — PATIENT MESSAGE (OUTPATIENT)
Dept: ADMINISTRATIVE | Facility: HOSPITAL | Age: 74
End: 2024-12-26
Payer: MEDICARE

## 2025-02-21 DIAGNOSIS — Z00.00 ENCOUNTER FOR MEDICARE ANNUAL WELLNESS EXAM: ICD-10-CM

## 2025-03-17 ENCOUNTER — PATIENT MESSAGE (OUTPATIENT)
Dept: ADMINISTRATIVE | Facility: HOSPITAL | Age: 75
End: 2025-03-17
Payer: MEDICARE

## 2025-04-15 ENCOUNTER — PATIENT MESSAGE (OUTPATIENT)
Dept: FAMILY MEDICINE | Facility: CLINIC | Age: 75
End: 2025-04-15
Payer: MEDICARE

## 2025-05-01 ENCOUNTER — PATIENT MESSAGE (OUTPATIENT)
Dept: FAMILY MEDICINE | Facility: CLINIC | Age: 75
End: 2025-05-01
Payer: MEDICARE

## 2025-05-28 DIAGNOSIS — I10 PRIMARY HYPERTENSION: ICD-10-CM

## 2025-06-03 ENCOUNTER — PATIENT MESSAGE (OUTPATIENT)
Dept: FAMILY MEDICINE | Facility: CLINIC | Age: 75
End: 2025-06-03
Payer: MEDICARE

## 2025-06-09 ENCOUNTER — HOSPITAL ENCOUNTER (OUTPATIENT)
Dept: RADIOLOGY | Facility: HOSPITAL | Age: 75
Discharge: HOME | End: 2025-06-09
Payer: MEDICARE

## 2025-06-09 ENCOUNTER — OFFICE VISIT (OUTPATIENT)
Dept: ORTHOPEDICS | Facility: CLINIC | Age: 75
End: 2025-06-09
Payer: MEDICARE

## 2025-06-09 VITALS — HEIGHT: 66 IN | RESPIRATION RATE: 16 BRPM | BODY MASS INDEX: 31.82 KG/M2 | WEIGHT: 198 LBS

## 2025-06-09 DIAGNOSIS — M25.561 RIGHT KNEE PAIN, UNSPECIFIED CHRONICITY: Primary | ICD-10-CM

## 2025-06-09 DIAGNOSIS — M25.561 RIGHT KNEE PAIN, UNSPECIFIED CHRONICITY: ICD-10-CM

## 2025-06-09 DIAGNOSIS — M17.11: Primary | ICD-10-CM

## 2025-06-09 PROCEDURE — 99999PBSHW PR PBB SHADOW TECHNICAL ONLY FILED TO HB: Mod: PBBFAC,,,

## 2025-06-09 PROCEDURE — 99999 PR PBB SHADOW E&M-EST. PATIENT-LVL III: CPT | Mod: PBBFAC,,,

## 2025-06-09 PROCEDURE — 73564 X-RAY EXAM KNEE 4 OR MORE: CPT | Mod: TC,PO,RT

## 2025-06-09 PROCEDURE — 99214 OFFICE O/P EST MOD 30 MIN: CPT | Mod: S$PBB,25,,

## 2025-06-09 PROCEDURE — 73564 X-RAY EXAM KNEE 4 OR MORE: CPT | Mod: 26,RT,, | Performed by: RADIOLOGY

## 2025-06-09 PROCEDURE — 20610 DRAIN/INJ JOINT/BURSA W/O US: CPT | Mod: S$PBB,RT,,

## 2025-06-09 PROCEDURE — 73562 X-RAY EXAM OF KNEE 3: CPT | Mod: 26,59,LT, | Performed by: RADIOLOGY

## 2025-06-09 PROCEDURE — 99213 OFFICE O/P EST LOW 20 MIN: CPT | Mod: PBBFAC,25,PO

## 2025-06-09 PROCEDURE — 20610 DRAIN/INJ JOINT/BURSA W/O US: CPT | Mod: PBBFAC,PO,RT

## 2025-06-09 RX ORDER — MELOXICAM 7.5 MG/1
7.5 TABLET ORAL DAILY
Qty: 30 TABLET | Refills: 0 | Status: SHIPPED | OUTPATIENT
Start: 2025-06-09 | End: 2025-07-09

## 2025-06-09 RX ORDER — TRIAMCINOLONE ACETONIDE 40 MG/ML
40 INJECTION, SUSPENSION INTRA-ARTICULAR; INTRAMUSCULAR
Status: DISCONTINUED | OUTPATIENT
Start: 2025-06-09 | End: 2025-06-09 | Stop reason: HOSPADM

## 2025-06-09 RX ADMIN — TRIAMCINOLONE ACETONIDE 40 MG: 40 INJECTION, SUSPENSION INTRA-ARTICULAR; INTRAMUSCULAR at 02:06

## 2025-06-09 NOTE — PROCEDURES
Large Joint Aspiration/Injection: R knee    Date/Time: 6/9/2025 2:00 PM    Performed by: Giovanni Valenzuela PA-C  Authorized by: Giovnani Valenzuela PA-C    Consent Done?:  Yes (Verbal)  Indications:  Pain and arthritis  Site marked: the procedure site was marked    Timeout: prior to procedure the correct patient, procedure, and site was verified      Local anesthesia used?: Yes    Local anesthetic: Ropivicaine.  Anesthetic total (ml):  3      Details:  Needle Size:  22 G  Approach:  Anterolateral  Location:  Knee  Site:  R knee  Medications:  40 mg triamcinolone acetonide 40 mg/mL  Patient tolerance:  Patient tolerated the procedure well with no immediate complications

## 2025-06-09 NOTE — PROGRESS NOTES
Chief Complaint:   Chief Complaint   Patient presents with    Right Knee - Pain       History of present illness:  Pleasant 74-year-old female presents to clinic for evaluation of right knee pain.  Pain has been ongoing for about a week since she was in her garden doing some digging.  She has a history of known right knee osteoarthritis and received a right knee steroid injection in March of 2023.  Patient denies injury, numbness, tingling to the right lower extremity.        Review of Systems   Constitutional: Negative for chills and fever.   Cardiovascular:  Negative for chest pain.   Respiratory:  Negative for shortness of breath.    Skin:  Negative for rash.   Musculoskeletal:  Negative for falls, joint pain, joint swelling, muscle cramps, muscle weakness and myalgias.   Neurological:  Negative for numbness, paresthesias and weakness.            Right Knee Exam     Muscle Strength   The patient has normal right knee strength.    Tenderness   The patient is experiencing tenderness in the medial joint line, patella and lateral joint line.    Range of Motion   Extension:  0   Flexion:  120 (limited)     Other   Erythema: absent  Sensation: normal  Pulse: present  Swelling: moderate  Effusion: effusion present    Comments:  Patellar Grind Test: Positive      Left Knee Exam     Other   Effusion: MILD.             X-rays:  Right knee x-rays reviewed showing joint space narrowing in the medial compartment as well as subchondral sclerosis.  Kellgren Fabricio grading scale grade 3.          Assessment:  1. Right knee osteoarthritis      Plan:  X-rays, diagnosis, treatment plans discussed with patient in detail.  At this time I will give patient and right knee steroid injection.  We discussed considering that they have no kidney dysfunction, not currently taking blood thinners, no uncontrolled GERD/peptic ulcer disease, no longstanding history of cardiac disease, and no adverse effects to NSAIDs that they are a  candidate for oral prescription NSAID therapy. I also recommended patient to ice, rest, elevate her lower extremity as needed.  Follow up if injection does not help.    There are no diagnoses linked to this encounter.       Past Medical History:   Diagnosis Date    Anxiety     Basal cell carcinoma     Cancer     breast cancer, dx 2015    Colon polyp     GERD (gastroesophageal reflux disease)        Past Surgical History:   Procedure Laterality Date    APPENDECTOMY      BREAST SURGERY      implants    CHOLECYSTECTOMY      COLONOSCOPY      ESOPHAGOGASTRODUODENOSCOPY N/A 1/11/2024    Procedure: EGD (ESOPHAGOGASTRODUODENOSCOPY);  Surgeon: Ellen Timmons MD;  Location: [facility];  Service: Endoscopy;  Laterality: N/A;    HYSTERECTOMY      MASTECTOMY      UPPER GASTROINTESTINAL ENDOSCOPY      ABOUT 20 YEARS AGO       Current Outpatient Medications   Medication Sig    aspirin (ECOTRIN) 81 MG EC tablet Take 1 tablet (81 mg total) by mouth once daily.    atorvastatin (LIPITOR) 80 MG tablet Take 1 tablet (80 mg total) by mouth once daily.    FLUoxetine 40 MG capsule Take 1 capsule (40 mg total) by mouth once daily.    losartan (COZAAR) 25 MG tablet Take 1 tablet (25 mg total) by mouth once daily.    mupirocin (BACTROBAN) 2 % ointment Apply topically 3 (three) times daily.    omeprazole (PRILOSEC) 40 MG capsule Take 1 capsule (40 mg total) by mouth once daily.     No current facility-administered medications for this visit.       Review of patient's allergies indicates:  No Known Allergies    Family History   Problem Relation Name Age of Onset    Heart attack Father      Liver cancer Paternal Uncle      Colon cancer Maternal Grandmother      Melanoma Child      Esophageal cancer Neg Hx      Crohn's disease Neg Hx      Rectal cancer Neg Hx      Stomach cancer Neg Hx      Ulcerative colitis Neg Hx         Social History[1]              All previous pertinent notes including ER visits, physical therapy visits, other  orthopedic visits as well as other care for the same musculoskeletal problem were reviewed.  All pertinent lab values and previous imaging was reviewed pertinent to the current visit.    This note was created using [employer] voice recognition software that occasionally misinterpreted phrases or words.    Consult note is delivered via Epic messaging service.    Giovanni Valenzuela PA-C            [1]   Social History  Socioeconomic History    Marital status:    Tobacco Use    Smoking status: Former     Current packs/day: 0.00     Average packs/day: 1 pack/day for 15.0 years (15.0 ttl pk-yrs)     Types: Cigarettes     Start date:      Quit date:      Years since quittin.4    Smokeless tobacco: Never   Substance and Sexual Activity    Alcohol use: Yes     Comment: daily bourbon coke 2-3 cups    Drug use: No    Sexual activity: Never

## 2025-06-19 ENCOUNTER — PATIENT MESSAGE (OUTPATIENT)
Dept: FAMILY MEDICINE | Facility: CLINIC | Age: 75
End: 2025-06-19
Payer: MEDICARE

## 2025-07-07 DIAGNOSIS — M17.11: ICD-10-CM

## 2025-07-07 NOTE — TELEPHONE ENCOUNTER
[facility]: MELOXICAM 7.5MG TABLETS.

## 2025-07-08 RX ORDER — MELOXICAM 7.5 MG/1
7.5 TABLET ORAL DAILY
Qty: 30 TABLET | Refills: 0 | Status: SHIPPED | OUTPATIENT
Start: 2025-07-08 | End: 2025-08-07